# Patient Record
Sex: FEMALE | Race: WHITE | Employment: OTHER | URBAN - METROPOLITAN AREA
[De-identification: names, ages, dates, MRNs, and addresses within clinical notes are randomized per-mention and may not be internally consistent; named-entity substitution may affect disease eponyms.]

---

## 2018-05-02 ENCOUNTER — TELEPHONE (OUTPATIENT)
Dept: INTERNAL MEDICINE CLINIC | Age: 63
End: 2018-05-02

## 2018-05-02 NOTE — TELEPHONE ENCOUNTER
Was recently diagnosed with diabetes by her gyno, Dr. Seng Marie. States she needs to re-establish with Dr. Dionisio MASCORRO so he can send her to endocrinology. Was told I would need to get permission since it has been 10 years since she was last seen. When told she would be considered a new patient and that Dr. Dionisio Ang is not taking new patients, she said her  is a patient and she certainly hoped Dr. Dionisio Ang would see her again. Please let her know.

## 2018-05-03 NOTE — TELEPHONE ENCOUNTER
Pt is frustrated that Dr/Nurse has not yet returned her call about what to do about her Diabetes. Her GYN told her to contact her PCP and be seen by a  Endocrinologist.  Pt stated she had not been in to see Dr Noreen Gordon since about 2008 but, expects a call back!   Advise ASAP - [phone]

## 2018-05-03 NOTE — TELEPHONE ENCOUNTER
Pt says that [facility] does not have an appt for 2 mons. Pt wanst Dr La Nena Rosado to get her in sooner as she was told by her GYN she had diabetes. ADVISE ASAP - [phone]  Pt Disappointed she has not yet heard from Dr La Nena Rosado!

## 2018-05-11 ENCOUNTER — OFFICE VISIT (OUTPATIENT)
Dept: INTERNAL MEDICINE CLINIC | Age: 63
End: 2018-05-11

## 2018-05-11 VITALS
RESPIRATION RATE: 20 BRPM | SYSTOLIC BLOOD PRESSURE: 134 MMHG | BODY MASS INDEX: 33.01 KG/M2 | OXYGEN SATURATION: 97 % | DIASTOLIC BLOOD PRESSURE: 84 MMHG | TEMPERATURE: 98.5 F | WEIGHT: 179.4 LBS | HEART RATE: 79 BPM | HEIGHT: 62 IN

## 2018-05-11 DIAGNOSIS — E11.9 TYPE 2 DIABETES MELLITUS WITHOUT COMPLICATION, WITHOUT LONG-TERM CURRENT USE OF INSULIN (HCC): Primary | ICD-10-CM

## 2018-05-11 LAB — HBA1C MFR BLD HPLC: 12.5 %

## 2018-05-11 RX ORDER — GLIMEPIRIDE 1 MG/1
1 TABLET ORAL
Qty: 30 TAB | Refills: 2 | Status: SHIPPED | OUTPATIENT
Start: 2018-05-11 | End: 2018-08-08 | Stop reason: SDUPTHER

## 2018-05-11 RX ORDER — METFORMIN HYDROCHLORIDE 500 MG/1
1000 TABLET, EXTENDED RELEASE ORAL
Qty: 60 TAB | Refills: 2 | Status: SHIPPED | OUTPATIENT
Start: 2018-05-11 | End: 2018-06-08 | Stop reason: SDUPTHER

## 2018-05-11 RX ORDER — LANCETS
EACH MISCELLANEOUS
Qty: 1 EACH | Refills: 11 | Status: SHIPPED | OUTPATIENT
Start: 2018-05-11 | End: 2018-06-08 | Stop reason: SDUPTHER

## 2018-05-11 RX ORDER — INSULIN PUMP SYRINGE, 3 ML
EACH MISCELLANEOUS
Qty: 1 KIT | Refills: 0 | Status: SHIPPED | OUTPATIENT
Start: 2018-05-11 | End: 2019-06-07

## 2018-05-11 RX ORDER — LANCETS
EACH MISCELLANEOUS
Qty: 1 EACH | Refills: 11 | Status: SHIPPED | OUTPATIENT
Start: 2018-05-11 | End: 2018-08-08 | Stop reason: SDUPTHER

## 2018-05-11 NOTE — PROGRESS NOTES
Chief Complaint   Patient presents with    New Patient     establishment    Diabetes     1. Have you been to the ER, urgent care clinic since your last visit? Hospitalized since your last visit? No    2. Have you seen or consulted any other health care providers outside of the [facility] since your last visit? Include any pap smears or colon screening.  No     Visit Vitals    /84 (BP 1 Location: Left arm, BP Patient Position: Sitting)    Pulse 79    Temp 98.5 °F (36.9 °C) (Oral)    Resp 20    Ht 5' 2\" (1.575 m)    Wt 179 lb 6.4 oz (81.4 kg)    SpO2 97%    BMI 32.81 kg/m2

## 2018-05-11 NOTE — MR AVS SNAPSHOT
[address], [address] [place] [address] 
[phone] Patient: Stephanie Alvarenga MRN:  [id number] Visit Information Date & Time Provider Department Dept. Phone Encounter #  
 5/11/2018  4:00 PM [facility] [phone] [id number] Follow-up Instructions Return in about 4 weeks (around 6/8/2018) for CPE, DM2 follow up. Upcoming Health Maintenance Date Due Hepatitis C Screening 1955 DTaP/Tdap/Td series (1 - Tdap) 3/7/1976 PAP AKA CERVICAL CYTOLOGY 3/7/1976 BREAST CANCER SCRN MAMMOGRAM 3/7/2005 FOBT Q 1 YEAR AGE 50-75 3/7/2005 ZOSTER VACCINE AGE 60> 1/7/2015 Influenza Age 5 to Adult 8/1/2018 Allergies as of 5/11/2018  Review Complete On: 5/11/2018 By: Amena Recio NP Severity Noted Reaction Type Reactions Codeine  05/11/2018    Other (comments) Past out Tetanus And Diphther. Tox (Pf)  05/11/2018    Swelling Current Immunizations  Never Reviewed No immunizations on file. Not reviewed this visit You Were Diagnosed With   
  
 Codes Comments Type 2 diabetes mellitus without complication, without long-term current use of insulin (HCC)    -  Primary ICD-10-CM: E11.9 ICD-9-CM: 250.00 Vitals BP Pulse Temp Resp Height(growth percentile) Weight(growth percentile) 134/84 (BP 1 Location: Left arm, BP Patient Position: Sitting) 79 98.5 °F (36.9 °C) (Oral) 20 5' 2\" (1.575 m) 179 lb 6.4 oz (81.4 kg) SpO2 BMI OB Status Smoking Status 97% 32.81 kg/m2 Hysterectomy Never Smoker Vitals History BMI and BSA Data Body Mass Index Body Surface Area  
 32.81 kg/m 2 1.89 m 2 Preferred Pharmacy Pharmacy Name Phone [id number] [place] [address] [phone] Your Updated Medication List  
  
   
 This list is accurate as of 5/11/18  4:57 PM.  Always use your most recent med list.  
  
  
  
  
 Blood-Glucose Meter monitoring kit Commonly known as:  [facility] Check BG 3x/day, Dx E11.9  
  
 glimepiride 1 mg tablet Commonly known as:  AMARYL Take 1 Tab by mouth Daily (before breakfast). glucose blood VI test strips strip Commonly known as:  ASCENSIA AUTODISC VI, ONE TOUCH ULTRA TEST VI Check BG 3x/day, Dx E11.9 OneTouch Miniultra Lancets Misc Check BG 3x/day, Dx E11.9 OneTouch Miniultra  
  
 metFORMIN  mg tablet Commonly known as:  GLUCOPHAGE XR Take 2 Tabs by mouth daily (with dinner). Prescriptions Printed Refills Lancets misc 11 Sig: Check BG 3x/day, Dx E11.9 OneTouch Miniultra Class: Print  
 glucose blood VI test strips (ASCENSIA AUTODISC VI, ONE TOUCH ULTRA TEST VI) strip 5 Sig: Check BG 3x/day, Dx E11.9 OneTouch Miniultra Class: Print Prescriptions Sent to Pharmacy Refills Blood-Glucose Meter (ONETOUCH ULTRAMINI) monitoring kit 0 Sig: Check BG 3x/day, Dx E11.9 Class: Normal  
 Pharmacy: [facility] # [address] [place] [address], [address] Ph #: [phone]  
 metFORMIN ER (GLUCOPHAGE XR) 500 mg tablet 2 Sig: Take 2 Tabs by mouth daily (with dinner). Class: Normal  
 Pharmacy: [facility] # [address] [place] [address], [address] Ph #: [phone] Route: Oral  
 glimepiride (AMARYL) 1 mg tablet 2 Sig: Take 1 Tab by mouth Daily (before breakfast). Class: Normal  
 Pharmacy: [facility] # [address] [place] [address], [address] Ph #: [phone] Route: Oral  
  
We Performed the Following AMB POC HEMOGLOBIN A1C [49639 CPT(R)] Follow-up Instructions Return in about 4 weeks (around 6/8/2018) for CPE, DM2 follow up. Patient Instructions No more than 6g sugar Start metformin 500mg XR tabs, take 2 tabs once a day Start glimeperide 1mg tabs, 1 pill once a day Check blood sugar first thing in the morning (fasting) and write down- bring these numbers with you to your appointments Learning About Meal Planning for Diabetes Why plan your meals? Meal planning can be a key part of managing diabetes. Planning meals and snacks with the right balance of carbohydrate, protein, and fat can help you keep your blood sugar at the target level you set with your doctor. You don't have to eat special foods. You can eat what your family eats, including sweets once in a while. But you do have to pay attention to how often you eat and how much you eat of certain foods. You may want to work with a dietitian or a certified diabetes educator. He or she can give you tips and meal ideas and can answer your questions about meal planning. This health professional can also help you reach a healthy weight if that is one of your goals. What plan is right for you? Your dietitian or diabetes educator may suggest that you start with the plate format or carbohydrate counting. The plate format The plate format is a simple way to help you manage how you eat. You plan meals by learning how much space each food should take on a plate. Using the plate format helps you spread carbohydrate throughout the day. It can make it easier to keep your blood sugar level within your target range. It also helps you see if you're eating healthy portion sizes. To use the plate format, you put non-starchy vegetables on half your plate. Add meat or meat substitutes on one-quarter of the plate. Put a grain or starchy vegetable (such as brown rice or a potato) on the final quarter of the plate. You can add a small piece of fruit and some low-fat or fat-free milk or yogurt, depending on your carbohydrate goal for each meal. 
Here are some tips for using the plate format: · Make sure that you are not using an oversized plate.  A 9-inch plate is best. Many restaurants use larger plates. · Get used to using the plate format at home. Then you can use it when you eat out. · Write down your questions about using the plate format. Talk to your doctor, a dietitian, or a diabetes educator about your concerns. Carbohydrate counting With carbohydrate counting, you plan meals based on the amount of carbohydrate in each food. Carbohydrate raises blood sugar higher and more quickly than any other nutrient. It is found in desserts, breads and cereals, and fruit. It's also found in starchy vegetables such as potatoes and corn, grains such as rice and pasta, and milk and yogurt. Spreading carbohydrate throughout the day helps keep your blood sugar levels within your target range. Your daily amount depends on several things, including your weight, how active you are, which diabetes medicines you take, and what your goals are for your blood sugar levels. A registered dietitian or diabetes educator can help you plan how much carbohydrate to include in each meal and snack. A guideline for your daily amount of carbohydrate is: · 45 to 60 grams at each meal. That's about the same as 3 to 4 carbohydrate servings. · 15 to 20 grams at each snack. That's about the same as 1 carbohydrate serving. The Nutrition Facts label on packaged foods tells you how much carbohydrate is in a serving of the food. First, look at the serving size on the food label. Is that the amount you eat in a serving? All of the nutrition information on a food label is based on that serving size. So if you eat more or less than that, you'll need to adjust the other numbers. Total carbohydrate is the next thing you need to look for on the label. If you count carbohydrate servings, one serving of carbohydrate is 15 grams. For foods that don't come with labels, such as fresh fruits and vegetables, you'll need a guide that lists carbohydrate in these foods. Ask your doctor, dietitian, or diabetes educator about books or other nutrition guides you can use. If you take insulin, you need to know how many grams of carbohydrate are in a meal. This lets you know how much rapid-acting insulin to take before you eat. If you use an insulin pump, you get a constant rate of insulin during the day. So the pump must be programmed at meals to give you extra insulin to cover the rise in blood sugar after meals. When you know how much carbohydrate you will eat, you can take the right amount of insulin. Or, if you always use the same amount of insulin, you need to make sure that you eat the same amount of carbohydrate at meals. If you need more help to understand carbohydrate counting and food labels, ask your doctor, dietitian, or diabetes educator. How do you get started with meal planning? Here are some tips to get started: 
· Plan your meals a week at a time. Don't forget to include snacks too. · Use cookbooks or online recipes to plan several main meals. Plan some quick meals for busy nights. You also can double some recipes that freeze well. Then you can save half for other busy nights when you don't have time to cook. · Make sure you have the ingredients you need for your recipes. If you're running low on basic items, put these items on your shopping list too. · List foods that you use to make breakfasts, lunches, and snacks. List plenty of fruits and vegetables. · Post this list on the refrigerator. Add to it as you think of more things you need. · Take the list to the store to do your weekly shopping. Follow-up care is a key part of your treatment and safety. Be sure to make and go to all appointments, and call your doctor if you are having problems. It's also a good idea to know your test results and keep a list of the medicines you take. Where can you learn more? Go to http://sheila-tonia.info/. Radha Schaefer in the search box to learn more about \"Learning About Meal Planning for Diabetes. \" Current as of: March 13, 2017 Content Version: 11.4 © 9734-8874 [employer]. Care instructions adapted under license by [employer] (which disclaims liability or warranty for this information). If you have questions about a medical condition or this instruction, always ask your healthcare professional. [address] any warranty or liability for your use of this information. Learning About Diabetes Food Guidelines Your Care Instructions Meal planning is important to manage diabetes. It helps keep your blood sugar at a target level (which you set with your doctor). You don't have to eat special foods. You can eat what your family eats, including sweets once in a while. But you do have to pay attention to how often you eat and how much you eat of certain foods. You may want to work with a dietitian or a certified diabetes educator (CDE) to help you plan meals and snacks. A dietitian or CDE can also help you lose weight if that is one of your goals. What should you know about eating carbs? Managing the amount of carbohydrate (carbs) you eat is an important part of healthy meals when you have diabetes. Carbohydrate is found in many foods. · Learn which foods have carbs. And learn the amounts of carbs in different foods. ¨ Bread, cereal, pasta, and rice have about 15 grams of carbs in a serving. A serving is 1 slice of bread (1 ounce), ½ cup of cooked cereal, or 1/3 cup of cooked pasta or rice. ¨ Fruits have 15 grams of carbs in a serving. A serving is 1 small fresh fruit, such as an apple or orange; ½ of a banana; ½ cup of cooked or canned fruit; ½ cup of fruit juice; 1 cup of melon or raspberries; or 2 tablespoons of dried fruit. ¨ Milk and no-sugar-added yogurt have 15 grams of carbs in a serving. A serving is 1 cup of milk or 2/3 cup of no-sugar-added yogurt. ¨ Starchy vegetables have 15 grams of carbs in a serving. A serving is ½ cup of mashed potatoes or sweet potato; 1 cup winter squash; ½ of a small baked potato; ½ cup of cooked beans; or ½ cup cooked corn or green peas. · Learn how much carbs to eat each day and at each meal. A dietitian or CDE can teach you how to keep track of the amount of carbs you eat. This is called carbohydrate counting. · If you are not sure how to count carbohydrate grams, use the Plate Method to plan meals. It is a good, quick way to make sure that you have a balanced meal. It also helps you spread carbs throughout the day. ¨ Divide your plate by types of foods. Put non-starchy vegetables on half the plate, meat or other protein food on one-quarter of the plate, and a grain or starchy vegetable in the final quarter of the plate. To this you can add a small piece of fruit and 1 cup of milk or yogurt, depending on how many carbs you are supposed to eat at a meal. 
· Try to eat about the same amount of carbs at each meal. Do not \"save up\" your daily allowance of carbs to eat at one meal. 
· Proteins have very little or no carbs per serving. Examples of proteins are beef, chicken, turkey, fish, eggs, tofu, cheese, cottage cheese, and peanut butter. A serving size of meat is 3 ounces, which is about the size of a deck of cards. Examples of meat substitute serving sizes (equal to 1 ounce of meat) are 1/4 cup of cottage cheese, 1 egg, 1 tablespoon of peanut butter, and ½ cup of tofu. How can you eat out and still eat healthy? · Learn to estimate the serving sizes of foods that have carbohydrate. If you measure food at home, it will be easier to estimate the amount in a serving of restaurant food. · If the meal you order has too much carbohydrate (such as potatoes, corn, or baked beans), ask to have a low-carbohydrate food instead. Ask for a salad or green vegetables.  
· If you use insulin, check your blood sugar before and after eating out to help you plan how much to eat in the future. · If you eat more carbohydrate at a meal than you had planned, take a walk or do other exercise. This will help lower your blood sugar. What else should you know? · Limit saturated fat, such as the fat from meat and dairy products. This is a healthy choice because people who have diabetes are at higher risk of heart disease. So choose lean cuts of meat and nonfat or low-fat dairy products. Use olive or canola oil instead of butter or shortening when cooking. · Don't skip meals. Your blood sugar may drop too low if you skip meals and take insulin or certain medicines for diabetes. · Check with your doctor before you drink alcohol. Alcohol can cause your blood sugar to drop too low. Alcohol can also cause a bad reaction if you take certain diabetes medicines. Follow-up care is a key part of your treatment and safety. Be sure to make and go to all appointments, and call your doctor if you are having problems. It's also a good idea to know your test results and keep a list of the medicines you take. Where can you learn more? Go to http://sheilaBlowout Boutiquetonia.info/. Enter T021 in the search box to learn more about \"Learning About Diabetes Food Guidelines. \" Current as of: March 13, 2017 Content Version: 11.4 © 4128-2944 [employer]. Care instructions adapted under license by [employer] (which disclaims liability or warranty for this information). If you have questions about a medical condition or this instruction, always ask your healthcare professional. [place] [address] any warranty or liability for your use of this information. Home Blood Glucose Test: About This Test 
What is it? A home blood glucose test measures the amount of a type of sugar, called glucose, in your blood. Why is this test done?  
People who have diabetes need to check the amount of glucose in their blood. A home blood glucose test is an easy way to test your blood at home or when you are away from home. The results help you know when to take action to keep your blood glucose levels in a target range. How can you prepare for the test? 
· Check the expiration date on the bottle of testing strips. Do not use test strips that have . · Match the code number on the testing strips bottle with the number on the meter. If the numbers do not match, follow the directions with the meter for changing the code number. What happens before the test? 
The supplies you will need for testing blood glucose include: · A blood glucose meter. · Testing strips. These are made to be used with a specific model of meter. · Sugar control solutions. Some meters require a specific solution. Many new meters are made to operate without a control solution. · Short needles called lancets for pricking your skin. · A pen-sized lakhani for the lancet (lancet device), which positions the lancet and controls how deeply it goes into your skin. · Clean cotton balls. These are used to stop the bleeding from the testing site. What happens during the test? 
A home blood glucose test involves pricking your finger, palm, or forearm with a lancet to collect a drop of blood. The blood drop is placed on a test strip, which you insert into the blood glucose meter. The instructions for testing are slightly different for each blood glucose meter model. Follow the instructions that came with your meter. · Wash your hands with warm, soapy water. Dry them well with a clean towel. You may also use an alcohol wipe to clean your finger or other site, but make sure your hands are dry before the test. 
· Insert a clean lancet into the lancet device. · Remove a test strip from the test strip bottle. Replace the lid immediately to keep moisture away from the other strips. · Follow the instructions that came with your meter to get it ready. · Use the lancet device to stick the side of your fingertip with the lancet. Do not stick the tip of your finger. Some blood sugar meters use lancet devices that take the blood sample from other sites, such as the palm of the hand or the forearm. But the finger is usually the most accurate place to test blood sugar. · Put a drop of blood on the correct spot on the test strip. · Apply pressure with a clean cotton ball to stop the bleeding. · Follow the directions that came with the meter to get the results. · Write down the results and the time that you tested your blood. Some meters will store the results for you. What else should you know about the test? 
The American Diabetes Association (ADA) recommends that you stay within the following blood glucose level ranges. But depending on your health, you and your doctor may set a different range for you. For nonpregnant adults with diabetes: 
· 80 milligrams per deciliter (mg/dL) to 130 mg/dL before a meal 
· Less than 180 mg/dL 1 to 2 hours after a meal 
For women who have diabetes related to pregnancy (gestational diabetes): · 95 mg/dL or less before breakfast 
· 120 to 140 mg/dL (or lower) 1 to 2 hours after a meal 
How long does the test take? · The blood glucose meter will show the results of the test in a minute or less. Where can you learn more? Go to http://sheila-tonia.info/. Enter N963 in the search box to learn more about \"Home Blood Glucose Test: About This Test.\" Current as of: March 13, 2017 Content Version: 11.4 © 9055-1269 [employer]. Care instructions adapted under license by [employer] (which disclaims liability or warranty for this information). If you have questions about a medical condition or this instruction, always ask your healthcare professional. [place] [address] any warranty or liability for your use of this information. Learning About Type 2 Diabetes What is type 2 diabetes? Insulin is a hormone that helps your body use sugar from your food as energy. Type 2 diabetes happens when your body can't use insulin the right way. Over time, the pancreas can't make enough insulin. If you don't have enough insulin, too much sugar stays in your blood. If you are overweight, get little or no exercise, or have type 2 diabetes in your family, you are more likely to have problems with the way insulin works in your body.  Americans, Hispanics, Native Americans,  Americans, and Pacific Islanders have a higher risk for type 2 diabetes. Type 2 diabetes can be prevented or delayed with a healthy lifestyle, which includes staying at a healthy weight, making smart food choices, and getting regular exercise. What can you expect with type 2 diabetes? Son Hilario keep hearing about how important it is to keep your blood sugar within a target range. That's because over time, high blood sugar can lead to serious problems. It can: 
· Harm your eyes, nerves, and kidneys. · Damage your blood vessels, leading to heart disease and stroke. · Reduce blood flow and cause nerve damage to parts of your body, especially your feet. This can cause slow healing and pain when you walk. · Make your immune system weak and less able to fight infections. When people hear the word \"diabetes,\" they often think of problems like these. But daily care and treatment can help prevent or delay these problems. The goal is to keep your blood sugar in a target range. That's the best way to reduce your chance of having more problems from diabetes. What are the symptoms? Some people who have type 2 diabetes may not have any symptoms early on. Many people with the disease don't even know they have it at first. But with time, diabetes starts to cause symptoms. You experience most symptoms of type 2 diabetes when your blood sugar is either too high or too low. The most common symptoms of high blood sugar include: · Thirst. 
· Frequent urination. · Weight loss. · Blurry vision. The symptoms of low blood sugar include: · Sweating. · Shakiness. · Weakness. · Hunger. · Confusion. How can you prevent type 2 diabetes? The best way to prevent or delay type 2 diabetes is to adopt healthy habits, which include: 
· Staying at a healthy weight. · Exercising regularly. · Eating healthy foods. How is type 2 diabetes treated? If you have type 2 diabetes, here are the most important things you can do. · Take your diabetes medicines. · Check your blood sugar as often as your doctor recommends. Also, get a hemoglobin A1c test at least every 6 months. · Try to eat a variety of foods and to spread carbohydrate throughout the day. Carbohydrate raises blood sugar higher and more quickly than any other nutrient does. Carbohydrate is found in sugar, breads and cereals, fruit, starchy vegetables such as potatoes and corn, and milk and yogurt. · Get at least 30 minutes of exercise on most days of the week. Walking is a good choice. You also may want to do other activities, such as running, swimming, cycling, or playing tennis or team sports. If your doctor says it's okay, do muscle-strengthening exercises at least 2 times a week. · See your doctor for checkups and tests on a regular schedule. · If you have high blood pressure or high cholesterol, take the medicines as prescribed by your doctor. · Do not smoke. Smoking can make health problems worse. This includes problems you might have with type 2 diabetes. If you need help quitting, talk to your doctor about stop-smoking programs and medicines. These can increase your chances of quitting for good. Follow-up care is a key part of your treatment and safety. Be sure to make and go to all appointments, and call your doctor if you are having problems.  It's also a good idea to know your test results and keep a list of the medicines you take. Where can you learn more? Go to http://sheila-tonia.info/. Enter J022 in the search box to learn more about \"Learning About Type 2 Diabetes. \" Current as of: March 13, 2017 Content Version: 11.4 © 9773-9305 [employer]. Care instructions adapted under license by [employer] (which disclaims liability or warranty for this information). If you have questions about a medical condition or this instruction, always ask your healthcare professional. [address] any warranty or liability for your use of this information. Learning About Metformin for Type 2 Diabetes Introduction Metformin (such as Glucophage) is a medicine used to treat type 2 diabetes. It helps keep blood sugar levels on target. You may have tried to eat a healthy diet, lose weight, and get more exercise to keep your blood sugar in your target range. If those things do not help, you may take a medicine called metformin. It helps your body use insulin. This can help you control your blood sugar. You might take it on its own or with other medicines. When taken on its own, metformin should not cause low blood sugar or weight gain. Example · Metformin (Glucophage) Possible side effects Common side effects include: · Short-term nausea. · Not feeling hungry. · Diarrhea. · Increased gas in your belly. · A metallic taste. You may have side effects or reactions not listed here. Check the information that comes with your medicine. What to know about taking this medicine · Metformin does not usually cause low blood sugar. But you may get a low blood sugar when you take metformin and you exercise hard, drink alcohol, or you do not eat enough food. · Sometimes metformin is combined with other diabetes medicine. Some of these can cause low blood sugar.  
· If you need a test that uses a dye or you need to have surgery, be sure to tell all of your doctors that you take metformin. You may have to stop taking it before and after the test or surgery. · Over time, blood levels of vitamin B12 can decrease in some people who take metformin. Your body needs this B vitamin to make blood cells. It also keeps your nervous system healthy. If you have been taking metformin for more than a few years, ask your doctor if you need a B12 blood test to measure the amount of vitamin B12 in your blood. · Be safe with medicines. Take your medicines exactly as prescribed. Call your doctor if you think you are having a problem with your medicine. · Check with your doctor or pharmacist before you use any other medicines. This includes over-the-counter medicines. Make sure your doctor knows all of the medicines, vitamins, herbal products, and supplements you take. Taking some medicines together can cause problems. Where can you learn more? Go to http://sheila-tonia.info/. Enter Judy Rapp in the search box to learn more about \"Learning About Metformin for Type 2 Diabetes. \" Current as of: March 13, 2017 Content Version: 11.4 © 8670-9254 [employer]. Care instructions adapted under license by [employer] (which disclaims liability or warranty for this information). If you have questions about a medical condition or this instruction, always ask your healthcare professional. [address] any warranty or liability for your use of this information. Introducing [facility]! Meghan Amaya introduces [employer] patient portal. Now you can access parts of your medical record, email your doctor's office, and request medication refills online. 1. In your internet browser, go to [email or web address] 2. Click on the First Time User? Click Here link in the Sign In box. You will see the New Member Sign Up page. 3. Enter your [employer] Access Code exactly as it appears below. You will not need to use this code after youve completed the sign-up process. If you do not sign up before the expiration date, you must request a new code. · [employer] Access Code: [email or web address] Expires: 8/9/2018  4:53 PM 
 
4. Enter the last four digits of your Social Security Number (xxxx) and Date of Birth (mm/dd/yyyy) as indicated and click Submit. You will be taken to the next sign-up page. 5. Create a [employer] ID. This will be your [employer] login ID and cannot be changed, so think of one that is secure and easy to remember. 6. Create a [employer] password. You can change your password at any time. 7. Enter your Password Reset Question and Answer. This can be used at a later time if you forget your password. 8. Enter your e-mail address. You will receive e-mail notification when new information is available in [address] 9. Click Sign Up. You can now view and download portions of your medical record. 10. Click the Download Summary menu link to download a portable copy of your medical information. If you have questions, please visit the Frequently Asked Questions section of the [employer] website. Remember, [employer] is NOT to be used for urgent needs. For medical emergencies, dial 911. Now available from your iPhone and Android! Please provide this summary of care documentation to your next provider. If you have any questions after today's visit, please call [phone].

## 2018-05-11 NOTE — PATIENT INSTRUCTIONS
No more than 6g sugar     Start metformin 500mg XR tabs, take 2 tabs once a day    Start glimeperide 1mg tabs, 1 pill once a day    Check blood sugar first thing in the morning (fasting) and write down- bring these numbers with you to your appointments           Learning About Meal Planning for Diabetes  Why plan your meals? Meal planning can be a key part of managing diabetes. Planning meals and snacks with the right balance of carbohydrate, protein, and fat can help you keep your blood sugar at the target level you set with your doctor. You don't have to eat special foods. You can eat what your family eats, including sweets once in a while. But you do have to pay attention to how often you eat and how much you eat of certain foods. You may want to work with a dietitian or a certified diabetes educator. He or she can give you tips and meal ideas and can answer your questions about meal planning. This health professional can also help you reach a healthy weight if that is one of your goals. What plan is right for you? Your dietitian or diabetes educator may suggest that you start with the plate format or carbohydrate counting. The plate format  The plate format is a simple way to help you manage how you eat. You plan meals by learning how much space each food should take on a plate. Using the plate format helps you spread carbohydrate throughout the day. It can make it easier to keep your blood sugar level within your target range. It also helps you see if you're eating healthy portion sizes. To use the plate format, you put non-starchy vegetables on half your plate. Add meat or meat substitutes on one-quarter of the plate. Put a grain or starchy vegetable (such as brown rice or a potato) on the final quarter of the plate.  You can add a small piece of fruit and some low-fat or fat-free milk or yogurt, depending on your carbohydrate goal for each meal.  Here are some tips for using the plate format:  · Make sure that you are not using an oversized plate. A 9-inch plate is best. Many restaurants use larger plates. · Get used to using the plate format at home. Then you can use it when you eat out. · Write down your questions about using the plate format. Talk to your doctor, a dietitian, or a diabetes educator about your concerns. Carbohydrate counting  With carbohydrate counting, you plan meals based on the amount of carbohydrate in each food. Carbohydrate raises blood sugar higher and more quickly than any other nutrient. It is found in desserts, breads and cereals, and fruit. It's also found in starchy vegetables such as potatoes and corn, grains such as rice and pasta, and milk and yogurt. Spreading carbohydrate throughout the day helps keep your blood sugar levels within your target range. Your daily amount depends on several things, including your weight, how active you are, which diabetes medicines you take, and what your goals are for your blood sugar levels. A registered dietitian or diabetes educator can help you plan how much carbohydrate to include in each meal and snack. A guideline for your daily amount of carbohydrate is:  · 45 to 60 grams at each meal. That's about the same as 3 to 4 carbohydrate servings. · 15 to 20 grams at each snack. That's about the same as 1 carbohydrate serving. The Nutrition Facts label on packaged foods tells you how much carbohydrate is in a serving of the food. First, look at the serving size on the food label. Is that the amount you eat in a serving? All of the nutrition information on a food label is based on that serving size. So if you eat more or less than that, you'll need to adjust the other numbers. Total carbohydrate is the next thing you need to look for on the label. If you count carbohydrate servings, one serving of carbohydrate is 15 grams.   For foods that don't come with labels, such as fresh fruits and vegetables, you'll need a guide that lists carbohydrate in these foods. Ask your doctor, dietitian, or diabetes educator about books or other nutrition guides you can use. If you take insulin, you need to know how many grams of carbohydrate are in a meal. This lets you know how much rapid-acting insulin to take before you eat. If you use an insulin pump, you get a constant rate of insulin during the day. So the pump must be programmed at meals to give you extra insulin to cover the rise in blood sugar after meals. When you know how much carbohydrate you will eat, you can take the right amount of insulin. Or, if you always use the same amount of insulin, you need to make sure that you eat the same amount of carbohydrate at meals. If you need more help to understand carbohydrate counting and food labels, ask your doctor, dietitian, or diabetes educator. How do you get started with meal planning? Here are some tips to get started:  · Plan your meals a week at a time. Don't forget to include snacks too. · Use cookbooks or online recipes to plan several main meals. Plan some quick meals for busy nights. You also can double some recipes that freeze well. Then you can save half for other busy nights when you don't have time to cook. · Make sure you have the ingredients you need for your recipes. If you're running low on basic items, put these items on your shopping list too. · List foods that you use to make breakfasts, lunches, and snacks. List plenty of fruits and vegetables. · Post this list on the refrigerator. Add to it as you think of more things you need. · Take the list to the store to do your weekly shopping. Follow-up care is a key part of your treatment and safety. Be sure to make and go to all appointments, and call your doctor if you are having problems. It's also a good idea to know your test results and keep a list of the medicines you take. Where can you learn more? Go to http://sheila-tonia.info/.   Palmer Rodrigues in the search box to learn more about \"Learning About Meal Planning for Diabetes. \"  Current as of: March 13, 2017  Content Version: 11.4  © 1199-8241 [employer]. Care instructions adapted under license by [employer] (which disclaims liability or warranty for this information). If you have questions about a medical condition or this instruction, always ask your healthcare professional. [address] any warranty or liability for your use of this information. Learning About Diabetes Food Guidelines  Your Care Instructions    Meal planning is important to manage diabetes. It helps keep your blood sugar at a target level (which you set with your doctor). You don't have to eat special foods. You can eat what your family eats, including sweets once in a while. But you do have to pay attention to how often you eat and how much you eat of certain foods. You may want to work with a dietitian or a certified diabetes educator (CDE) to help you plan meals and snacks. A dietitian or CDE can also help you lose weight if that is one of your goals. What should you know about eating carbs? Managing the amount of carbohydrate (carbs) you eat is an important part of healthy meals when you have diabetes. Carbohydrate is found in many foods. · Learn which foods have carbs. And learn the amounts of carbs in different foods. ¨ Bread, cereal, pasta, and rice have about 15 grams of carbs in a serving. A serving is 1 slice of bread (1 ounce), ½ cup of cooked cereal, or 1/3 cup of cooked pasta or rice. ¨ Fruits have 15 grams of carbs in a serving. A serving is 1 small fresh fruit, such as an apple or orange; ½ of a banana; ½ cup of cooked or canned fruit; ½ cup of fruit juice; 1 cup of melon or raspberries; or 2 tablespoons of dried fruit. ¨ Milk and no-sugar-added yogurt have 15 grams of carbs in a serving. A serving is 1 cup of milk or 2/3 cup of no-sugar-added yogurt.   ¨ Starchy vegetables have 15 grams of carbs in a serving. A serving is ½ cup of mashed potatoes or sweet potato; 1 cup winter squash; ½ of a small baked potato; ½ cup of cooked beans; or ½ cup cooked corn or green peas. · Learn how much carbs to eat each day and at each meal. A dietitian or CDE can teach you how to keep track of the amount of carbs you eat. This is called carbohydrate counting. · If you are not sure how to count carbohydrate grams, use the Plate Method to plan meals. It is a good, quick way to make sure that you have a balanced meal. It also helps you spread carbs throughout the day. ¨ Divide your plate by types of foods. Put non-starchy vegetables on half the plate, meat or other protein food on one-quarter of the plate, and a grain or starchy vegetable in the final quarter of the plate. To this you can add a small piece of fruit and 1 cup of milk or yogurt, depending on how many carbs you are supposed to eat at a meal.  · Try to eat about the same amount of carbs at each meal. Do not \"save up\" your daily allowance of carbs to eat at one meal.  · Proteins have very little or no carbs per serving. Examples of proteins are beef, chicken, turkey, fish, eggs, tofu, cheese, cottage cheese, and peanut butter. A serving size of meat is 3 ounces, which is about the size of a deck of cards. Examples of meat substitute serving sizes (equal to 1 ounce of meat) are 1/4 cup of cottage cheese, 1 egg, 1 tablespoon of peanut butter, and ½ cup of tofu. How can you eat out and still eat healthy? · Learn to estimate the serving sizes of foods that have carbohydrate. If you measure food at home, it will be easier to estimate the amount in a serving of restaurant food. · If the meal you order has too much carbohydrate (such as potatoes, corn, or baked beans), ask to have a low-carbohydrate food instead. Ask for a salad or green vegetables.   · If you use insulin, check your blood sugar before and after eating out to help you plan how much to eat in the future. · If you eat more carbohydrate at a meal than you had planned, take a walk or do other exercise. This will help lower your blood sugar. What else should you know? · Limit saturated fat, such as the fat from meat and dairy products. This is a healthy choice because people who have diabetes are at higher risk of heart disease. So choose lean cuts of meat and nonfat or low-fat dairy products. Use olive or canola oil instead of butter or shortening when cooking. · Don't skip meals. Your blood sugar may drop too low if you skip meals and take insulin or certain medicines for diabetes. · Check with your doctor before you drink alcohol. Alcohol can cause your blood sugar to drop too low. Alcohol can also cause a bad reaction if you take certain diabetes medicines. Follow-up care is a key part of your treatment and safety. Be sure to make and go to all appointments, and call your doctor if you are having problems. It's also a good idea to know your test results and keep a list of the medicines you take. Where can you learn more? Go to http://sheila-tonia.info/. Enter N543 in the search box to learn more about \"Learning About Diabetes Food Guidelines. \"  Current as of: March 13, 2017  Content Version: 11.4  © 6109-3281 [employer], [employer]. Care instructions adapted under license by [employer] (which disclaims liability or warranty for this information). If you have questions about a medical condition or this instruction, always ask your healthcare professional. [place] [address] any warranty or liability for your use of this information. Home Blood Glucose Test: About This Test  What is it? A home blood glucose test measures the amount of a type of sugar, called glucose, in your blood. Why is this test done? People who have diabetes need to check the amount of glucose in their blood.  A home blood glucose test is an easy way to test your blood at home or when you are away from home. The results help you know when to take action to keep your blood glucose levels in a target range. How can you prepare for the test?  · Check the expiration date on the bottle of testing strips. Do not use test strips that have . · Match the code number on the testing strips bottle with the number on the meter. If the numbers do not match, follow the directions with the meter for changing the code number. What happens before the test?  The supplies you will need for testing blood glucose include:  · A blood glucose meter. · Testing strips. These are made to be used with a specific model of meter. · Sugar control solutions. Some meters require a specific solution. Many new meters are made to operate without a control solution. · Short needles called lancets for pricking your skin. · A pen-sized lakhani for the lancet (lancet device), which positions the lancet and controls how deeply it goes into your skin. · Clean cotton balls. These are used to stop the bleeding from the testing site. What happens during the test?  A home blood glucose test involves pricking your finger, palm, or forearm with a lancet to collect a drop of blood. The blood drop is placed on a test strip, which you insert into the blood glucose meter. The instructions for testing are slightly different for each blood glucose meter model. Follow the instructions that came with your meter. · Wash your hands with warm, soapy water. Dry them well with a clean towel. You may also use an alcohol wipe to clean your finger or other site, but make sure your hands are dry before the test.  · Insert a clean lancet into the lancet device. · Remove a test strip from the test strip bottle. Replace the lid immediately to keep moisture away from the other strips. · Follow the instructions that came with your meter to get it ready.   · Use the lancet device to stick the side of your fingertip with the lancet. Do not stick the tip of your finger. Some blood sugar meters use lancet devices that take the blood sample from other sites, such as the palm of the hand or the forearm. But the finger is usually the most accurate place to test blood sugar. · Put a drop of blood on the correct spot on the test strip. · Apply pressure with a clean cotton ball to stop the bleeding. · Follow the directions that came with the meter to get the results. · Write down the results and the time that you tested your blood. Some meters will store the results for you. What else should you know about the test?  The American Diabetes Association (ADA) recommends that you stay within the following blood glucose level ranges. But depending on your health, you and your doctor may set a different range for you. For nonpregnant adults with diabetes:  · 80 milligrams per deciliter (mg/dL) to 130 mg/dL before a meal  · Less than 180 mg/dL 1 to 2 hours after a meal  For women who have diabetes related to pregnancy (gestational diabetes):  · 95 mg/dL or less before breakfast  · 120 to 140 mg/dL (or lower) 1 to 2 hours after a meal  How long does the test take? · The blood glucose meter will show the results of the test in a minute or less. Where can you learn more? Go to http://sheila-tonia.info/. Enter B703 in the search box to learn more about \"Home Blood Glucose Test: About This Test.\"  Current as of: March 13, 2017  Content Version: 11.4  © 1760-6985 [employer]. Care instructions adapted under license by [employer] (which disclaims liability or warranty for this information). If you have questions about a medical condition or this instruction, always ask your healthcare professional. [place] [address] any warranty or liability for your use of this information. Learning About Type 2 Diabetes  What is type 2 diabetes?     Insulin is a hormone that helps your body use sugar from your food as energy. Type 2 diabetes happens when your body can't use insulin the right way. Over time, the pancreas can't make enough insulin. If you don't have enough insulin, too much sugar stays in your blood. If you are overweight, get little or no exercise, or have type 2 diabetes in your family, you are more likely to have problems with the way insulin works in your body.  Americans, Hispanics, Native Americans,  Americans, and Pacific Islanders have a higher risk for type 2 diabetes. Type 2 diabetes can be prevented or delayed with a healthy lifestyle, which includes staying at a healthy weight, making smart food choices, and getting regular exercise. What can you expect with type 2 diabetes? Sujatha Blanco keep hearing about how important it is to keep your blood sugar within a target range. That's because over time, high blood sugar can lead to serious problems. It can:  · Harm your eyes, nerves, and kidneys. · Damage your blood vessels, leading to heart disease and stroke. · Reduce blood flow and cause nerve damage to parts of your body, especially your feet. This can cause slow healing and pain when you walk. · Make your immune system weak and less able to fight infections. When people hear the word \"diabetes,\" they often think of problems like these. But daily care and treatment can help prevent or delay these problems. The goal is to keep your blood sugar in a target range. That's the best way to reduce your chance of having more problems from diabetes. What are the symptoms? Some people who have type 2 diabetes may not have any symptoms early on. Many people with the disease don't even know they have it at first. But with time, diabetes starts to cause symptoms. You experience most symptoms of type 2 diabetes when your blood sugar is either too high or too low.   The most common symptoms of high blood sugar include:  · Thirst.  · Frequent urination. · Weight loss. · Blurry vision. The symptoms of low blood sugar include:  · Sweating. · Shakiness. · Weakness. · Hunger. · Confusion. How can you prevent type 2 diabetes? The best way to prevent or delay type 2 diabetes is to adopt healthy habits, which include:  · Staying at a healthy weight. · Exercising regularly. · Eating healthy foods. How is type 2 diabetes treated? If you have type 2 diabetes, here are the most important things you can do. · Take your diabetes medicines. · Check your blood sugar as often as your doctor recommends. Also, get a hemoglobin A1c test at least every 6 months. · Try to eat a variety of foods and to spread carbohydrate throughout the day. Carbohydrate raises blood sugar higher and more quickly than any other nutrient does. Carbohydrate is found in sugar, breads and cereals, fruit, starchy vegetables such as potatoes and corn, and milk and yogurt. · Get at least 30 minutes of exercise on most days of the week. Walking is a good choice. You also may want to do other activities, such as running, swimming, cycling, or playing tennis or team sports. If your doctor says it's okay, do muscle-strengthening exercises at least 2 times a week. · See your doctor for checkups and tests on a regular schedule. · If you have high blood pressure or high cholesterol, take the medicines as prescribed by your doctor. · Do not smoke. Smoking can make health problems worse. This includes problems you might have with type 2 diabetes. If you need help quitting, talk to your doctor about stop-smoking programs and medicines. These can increase your chances of quitting for good. Follow-up care is a key part of your treatment and safety. Be sure to make and go to all appointments, and call your doctor if you are having problems. It's also a good idea to know your test results and keep a list of the medicines you take. Where can you learn more?   Go to http://sheila-tonia.info/. Enter M035 in the search box to learn more about \"Learning About Type 2 Diabetes. \"  Current as of: March 13, 2017  Content Version: 11.4  © 4321-8592 [employer]. Care instructions adapted under license by [employer] (which disclaims liability or warranty for this information). If you have questions about a medical condition or this instruction, always ask your healthcare professional. [address] any warranty or liability for your use of this information. Learning About Metformin for Type 2 Diabetes  Introduction    Metformin (such as Glucophage) is a medicine used to treat type 2 diabetes. It helps keep blood sugar levels on target. You may have tried to eat a healthy diet, lose weight, and get more exercise to keep your blood sugar in your target range. If those things do not help, you may take a medicine called metformin. It helps your body use insulin. This can help you control your blood sugar. You might take it on its own or with other medicines. When taken on its own, metformin should not cause low blood sugar or weight gain. Example  · Metformin (Glucophage)  Possible side effects  Common side effects include:  · Short-term nausea. · Not feeling hungry. · Diarrhea. · Increased gas in your belly. · A metallic taste. You may have side effects or reactions not listed here. Check the information that comes with your medicine. What to know about taking this medicine  · Metformin does not usually cause low blood sugar. But you may get a low blood sugar when you take metformin and you exercise hard, drink alcohol, or you do not eat enough food. · Sometimes metformin is combined with other diabetes medicine. Some of these can cause low blood sugar. · If you need a test that uses a dye or you need to have surgery, be sure to tell all of your doctors that you take metformin.  You may have to stop taking it before and after the test or surgery. · Over time, blood levels of vitamin B12 can decrease in some people who take metformin. Your body needs this B vitamin to make blood cells. It also keeps your nervous system healthy. If you have been taking metformin for more than a few years, ask your doctor if you need a B12 blood test to measure the amount of vitamin B12 in your blood. · Be safe with medicines. Take your medicines exactly as prescribed. Call your doctor if you think you are having a problem with your medicine. · Check with your doctor or pharmacist before you use any other medicines. This includes over-the-counter medicines. Make sure your doctor knows all of the medicines, vitamins, herbal products, and supplements you take. Taking some medicines together can cause problems. Where can you learn more? Go to http://sheila-tonia.info/. Enter Josse Mackey in the search box to learn more about \"Learning About Metformin for Type 2 Diabetes. \"  Current as of: March 13, 2017  Content Version: 11.4  © 7789-5057 Healthwise, [employer]. Care instructions adapted under license by [employer] (which disclaims liability or warranty for this information). If you have questions about a medical condition or this instruction, always ask your healthcare professional. [address] any warranty or liability for your use of this information.

## 2018-05-11 NOTE — PROGRESS NOTES
Subjective: Mary Lou Beltran is a 61 y.o. female who presents today for new patient appointment    Was 8 minutes late to her appointment today    Previously followed by Dr. Paige Wood, however had not been seen since 2008    Was recently diagnosed (1 week ago) with DM2 by OBGYN (Dr. Escalante Every with [facility]) and was told to follow up with PCP    Had about 40lb over the past 1-1.5 years or so, had changed her diet  Likes to drink sweet tea daily- 4 glasses a day  Had HgbA1c done, wasn't sure what her HgbA1c was though  poc HgbA1c 12.5% today  Did glucose challenge test at [facility] [place] as well  Has had several labs through OBGYN, unsure of which and results  No numbness or tingling of extremities  No polyuria or polydypsia  No family history of diabetes    No real exercise other than yard work/house work, occasionally walks dog    Diet prior to finding out DM2:  Breakfast: varies, cereal, eggs, omlet with peppers  Lunch: tomatoe sandwhich, grilled cheese  Dinner: meat, mashed potatoes, white rice, veggies  Was eating sweets every night after dinner    Over the past week has cut out sugar, tried to not eat simple/white carbs  Has only drank 1 glass of unsweetened ice tea    No other chronic medical conditions    Pap and mammogram UTD- [facility]    Patient Active Problem List    Diagnosis Date Noted    Type II diabetes mellitus ([facility] [address] 05/11/2018        Review of Systems    Pertinent items are noted in HPI.      Objective:     Visit Vitals    /84 (BP 1 Location: Left arm, BP Patient Position: Sitting)    Pulse 79    Temp 98.5 °F (36.9 °C) (Oral)    Resp 20    Ht 5' 2\" (1.575 m)    Wt 179 lb 6.4 oz (81.4 kg)    SpO2 97%    BMI 32.81 kg/m2     General appearance: alert, cooperative, no distress, appears stated age  Head: Normocephalic, without obvious abnormality, atraumatic  Lungs: normal effort  Heart: regular rate  Extremities: extremities normal, atraumatic, no cyanosis or edema  Skin: warm and dry  Neurologic: Grossly normal    Assessment/Plan:     1. Type 2 diabetes mellitus without complication, without long-term current use of insulin ([facility] [address]  - education regarding DM2, regarding diet- limiting carbohydrates/portion control/limiting sugar in processed foods  - needs to start walking, start with 1 mile 2x/day 5 days a week  - will schedule with PharmD Zac for diet education and nutrition education  - will obtain labs and records from OBGYN before ordering labs  - AMB POC HEMOGLOBIN A1C  - Blood-Glucose Meter (ONETOUCH ULTRAMINI) monitoring kit; Check BG 3x/day, Dx E11.9  Dispense: 1 Kit; Refill: 0  - Lancets misc; Check BG 3x/day, Dx E11.9  OneTouch Miniultra  Dispense: 1 Each; Refill: 11  - glucose blood VI test strips (ASCENSIA AUTODISC VI, ONE TOUCH ULTRA TEST VI) strip; Check BG 3x/day, Dx E11.9  OneTouch Miniultra  Dispense: 100 Strip; Refill: 5  - metFORMIN ER (GLUCOPHAGE XR) 500 mg tablet; Take 2 Tabs by mouth daily (with dinner). Dispense: 60 Tab; Refill: 2  - glimepiride (AMARYL) 1 mg tablet; Take 1 Tab by mouth Daily (before breakfast). Dispense: 30 Tab; Refill: 2  - Lancets misc; Check BG 3x/day, Dx E11.9 OneTouch Miniultra  Dispense: 1 Each; Refill: 11  - glucose blood VI test strips (ASCENSIA AUTODISC VI, ONE TOUCH ULTRA TEST VI) strip; Check BG 3x/day, Dx E11.9 OneTouch Miniultra  Dispense: 100 Strip; Refill: 2      Advised her to call back or return to office if symptoms worsen/change/persist.  Discussed expected course/resolution/complications of diagnosis in detail with patient. Medication risks/benefits/costs/interactions/alternatives discussed with patient. She was given an after visit summary which includes diagnoses, current medications, & vitals. She expressed understanding with the diagnosis and plan.     RENETTA Chakraborty

## 2018-05-14 ENCOUNTER — TELEPHONE (OUTPATIENT)
Dept: INTERNAL MEDICINE CLINIC | Age: 63
End: 2018-05-14

## 2018-05-14 NOTE — TELEPHONE ENCOUNTER
Jason De La O    External Physician           Specialty     Obstetrics & Gynecology, Gynecology, Obstetrics       Primary Contact Information      Phone Fax E-mail Address     [phone] Not available. [address] [facility]     Patient was in office on 5/11/18 and signed a medical release form.

## 2018-05-18 ENCOUNTER — OFFICE VISIT (OUTPATIENT)
Dept: INTERNAL MEDICINE CLINIC | Age: 63
End: 2018-05-18

## 2018-05-18 VITALS
SYSTOLIC BLOOD PRESSURE: 138 MMHG | DIASTOLIC BLOOD PRESSURE: 78 MMHG | HEIGHT: 62 IN | HEART RATE: 77 BPM | TEMPERATURE: 97.5 F | WEIGHT: 179.6 LBS | BODY MASS INDEX: 33.05 KG/M2 | OXYGEN SATURATION: 99 % | RESPIRATION RATE: 20 BRPM

## 2018-05-18 DIAGNOSIS — E11.9 TYPE 2 DIABETES MELLITUS WITHOUT COMPLICATION, WITHOUT LONG-TERM CURRENT USE OF INSULIN (HCC): Primary | ICD-10-CM

## 2018-05-18 NOTE — PROGRESS NOTES
CC:  Diabetes management/education    S/O: Kemi Irene is a 61 y.o. female referred by Dr. Cherrie Calderon, MUKESH for diabetes management. HPI:January 2017 weighed 45 lbs more. [facility] and scale was surprising- she was excited that she had lost so much weight. Went to GYN in April - urinary analysis showed sugar- doctor order labs - glucose tolerance test. Doctor called saying her numbers were high and she needed to see PCP. Her old PCP would not take her back because it had been too long and now he was no longer taking new patients. She then made an appointment with Leila Leon where she found out about the diagnosis. Patient has been extremely worried about her sugars being high. She immediately started metformin and checking her sugars. Current Diabetes Regimen:  Started checking sugars Tuesday  First meds were Sunday morning towards the end of breakfast  Metformin ER 500mg 2 tabs once daily at dinner  Glimepiride 1mg 1 tab every morning with breakfast    ROS:   Patient denies hypoglycemic and hyperglycemic signs/symptoms, chest pain, shortness of breath, neuropathy, vision changes, and any foot changes. Self-Monitoring Blood Glucose:  197  227  174  168    Diet:   cooks a lot so he's on board for changes  They've been looking up information online about diabetes and finding conflicting information  Eating more veggies and less carbs; trying to eat healthier carbs    Exercise:  Trying to do a lot more walking        Data reviewed:  Lab Results   Component Value Date/Time    Hemoglobin A1c (POC) 12.5 05/11/2018 04:41 PM         Assessment/Plan:     1. Diabetes:  a1c not at goal <7% however patient was just newly diagnosed. Has classic symptoms of hyperglycemia and was found to have glucose in her urine at GYN visit. Patient started taking metformin 2 tabs once daily/glimepiride 1mg once daily and has been checking her blood sugars.  Is already seeing some improvements there. Reviewed healthy meal planning, the plate method, increasing non-starchy vegetables and decreasing starchy vegetables. Also educated her about looking at food labels for total carb content vs just looking at sugar and total carb/meal goals. Patient to follow up with me in a few weeks along with Marla. Patient verbalized understanding of the information presented and all of the patients questions were answered. AVS was handed to the patient and information reviewed. Patient advised to call me or Dr. Bhavna Yoder NP with any additional questions or concerns. Follow-up: ~3 weeks  Notification of recommendations will be sent to Dr. Bhavna Yoder NP for review.       Hoa Baez, RamonD, BCPS, CDE

## 2018-05-18 NOTE — PROGRESS NOTES
Cynthia Quevedo is a 61 y.o. female    Chief Complaint   Patient presents with    Diabetes     1. Have you been to the ER, urgent care clinic since your last visit? Hospitalized since your last visit? No    2. Have you seen or consulted any other health care providers outside of the [facility] since your last visit? Include any pap smears or colon screening.  No     Health Maintenance Due   Topic Date Due    Hepatitis C Screening  1955    LIPID PANEL Q1  1955    FOOT EXAM Q1  03/07/1965    MICROALBUMIN Q1  03/07/1965    EYE EXAM RETINAL OR DILATED Q1  03/07/1965    Pneumococcal 19-64 Medium Risk (1 of 1 - PPSV23) 03/07/1974    DTaP/Tdap/Td series (1 - Tdap) 03/07/1976    FOBT Q 1 YEAR AGE 50-75  03/07/2005    ZOSTER VACCINE AGE 60>  01/07/2015    PAP AKA CERVICAL CYTOLOGY  06/29/2015

## 2018-06-08 ENCOUNTER — OFFICE VISIT (OUTPATIENT)
Dept: INTERNAL MEDICINE CLINIC | Age: 63
End: 2018-06-08

## 2018-06-08 ENCOUNTER — TELEPHONE (OUTPATIENT)
Dept: INTERNAL MEDICINE CLINIC | Age: 63
End: 2018-06-08

## 2018-06-08 VITALS
WEIGHT: 180 LBS | OXYGEN SATURATION: 98 % | SYSTOLIC BLOOD PRESSURE: 136 MMHG | BODY MASS INDEX: 33.13 KG/M2 | HEART RATE: 71 BPM | RESPIRATION RATE: 14 BRPM | HEIGHT: 62 IN | TEMPERATURE: 97.9 F | DIASTOLIC BLOOD PRESSURE: 78 MMHG

## 2018-06-08 DIAGNOSIS — Z23 ENCOUNTER FOR IMMUNIZATION: ICD-10-CM

## 2018-06-08 DIAGNOSIS — E11.9 TYPE 2 DIABETES MELLITUS WITHOUT COMPLICATION, WITHOUT LONG-TERM CURRENT USE OF INSULIN (HCC): ICD-10-CM

## 2018-06-08 DIAGNOSIS — E66.9 OBESITY (BMI 30-39.9): ICD-10-CM

## 2018-06-08 DIAGNOSIS — Z00.00 ROUTINE GENERAL MEDICAL EXAMINATION AT A HEALTH CARE FACILITY: Primary | ICD-10-CM

## 2018-06-08 DIAGNOSIS — Z11.59 SCREENING FOR VIRAL DISEASE: ICD-10-CM

## 2018-06-08 DIAGNOSIS — Z13.21 ENCOUNTER FOR VITAMIN DEFICIENCY SCREENING: ICD-10-CM

## 2018-06-08 DIAGNOSIS — E11.9 ENCOUNTER FOR DIABETIC FOOT EXAM (HCC): ICD-10-CM

## 2018-06-08 PROBLEM — Z85.828 HISTORY OF BASAL CELL CARCINOMA: Status: ACTIVE | Noted: 2018-06-08

## 2018-06-08 RX ORDER — METFORMIN HYDROCHLORIDE 500 MG/1
1500 TABLET, EXTENDED RELEASE ORAL
Qty: 60 TAB | Refills: 2 | Status: SHIPPED | OUTPATIENT
Start: 2018-06-08 | End: 2018-08-08 | Stop reason: SDUPTHER

## 2018-06-08 NOTE — PROGRESS NOTES
Subjective: Rangel Saucedo is a 61 y.o. female who presents today for CPE and follow up DM2    Was 11 minutes late to her appointment today     Seen 1 month ago to establish care after recent dx of DM2 by OBGYN Dr. Teodora Donaldson. poc HgbA1c at out visit was 12.5%  Started on metformin XR, take 1,000mg at dinner. Also taking glimeperide 1mg daily  Met with PharmD Mehnaz Moya for diet education   Has cut out sweet tea, simple carbs, and sweets. Has been logging her food intake, she has found this helpful  Checking BG at home, fasting. Most readings 120s-140s. This morning was 190- ate poorly yesterday (hamburger with bun and homemade lemonade, dinner with cauliflower pizza and salad and protein bar before going to bed)  Denies peripheral neuropathy, polyuria or polydypsia   Has been walking 1 mile most days    Denies any chest pain, palpitations, shortness of breath, cough, abdominal pain, bowel changes, blood in stool, difficulty urinating, headaches, or dizziness    Denies family history of breast or gyn cancers  Denies family history of heart disease    Follows with dermatologist Dr. Irene Spangler. Had a basal cell carcinoma on back several years ago. Last seen in 05/2018 for skin check      Health maintenance:   Last pap:  [facility] TRE[facility], [facility], will request copy  Last mammogram:  [facility] Segundo TREJO, [facility]   Last colonoscopy:   Every 5 years, believes she is due again in 2019, Dr. Florencio Chen, father with colon cancer.   Pt never had polyps  Last tetanus vaccination  Had once, made her too sore- refuses  Last pneumonia vaccination: never had  Shingles vaccination: never had    Patient Active Problem List    Diagnosis Date Noted    Type II diabetes mellitus ([facility] [address] 05/11/2018     Current Outpatient Prescriptions   Medication Sig Dispense Refill    Blood-Glucose Meter (ONETOUCH ULTRAMINI) monitoring kit Check BG 3x/day, Dx E11.9 1 Kit 0    Lancets misc Check BG 3x/day, Dx E11.9  OneTouch Miniultra 1 Each 11    glucose blood VI test strips (ASCENSIA AUTODISC VI, ONE TOUCH ULTRA TEST VI) strip Check BG 3x/day, Dx E11.9  OneTouch Miniultra 100 Strip 5    metFORMIN ER (GLUCOPHAGE XR) 500 mg tablet Take 2 Tabs by mouth daily (with dinner). 60 Tab 2    glimepiride (AMARYL) 1 mg tablet Take 1 Tab by mouth Daily (before breakfast). 30 Tab 2        Review of Systems    Pertinent items are noted in HPI. Objective:     Visit Vitals    /78 (BP 1 Location: Left arm, BP Patient Position: Sitting)    Pulse 71    Temp 97.9 °F (36.6 °C) (Oral)    Resp 14    Ht 5' 2\" (1.575 m)    Wt 180 lb (81.6 kg)    SpO2 98%    BMI 32.92 kg/m2     General:  Alert, cooperative, no distress, appears stated age. Head:  Normocephalic, without obvious abnormality, atraumatic. Eyes:  Conjunctivae/corneas clear. PERRL, EOMs intact   Ears:  Normal TMs and external ear canals both ears. Nose: Nares normal. Septum midline. Mucosa normal   Throat: Lips, mucosa, and tongue normal. Teeth and gums normal.   Neck: Supple, symmetrical, trachea midline, no adenopathy, thyroid: no enlargement/tenderness/nodules, no carotid bruit and no JVD. Back:   Symmetric, no curvature. ROM normal.   Lungs:   Clear to auscultation bilaterally. Chest wall:  No tenderness or deformity. Heart:  Regular rate and rhythm, S1, S2 normal, no murmur, click, rub or gallop. Breast Exam:  No tenderness, masses, or nipple abnormality. Abdomen:   Soft, non-tender. Bowel sounds normal. No masses,  No organomegaly. Extremities: Extremities normal, atraumatic, no cyanosis or edema. Pulses: 2+ and symmetric all extremities. Skin: Skin color and turgor normal.  Sun damaged skin, numerous skin lesions on back   Lymph nodes: Cervical, supraclavicular, and axillary nodes normal.   Neurologic: CNII-XII intact. Normal strength, sensation throughout.      Diabetic foot exam:     Left Foot:   Visual Exam: normal Pulse DP: 2+ (normal)   Filament test: 2/6   Vibratory sensation: normal      Right Foot:   Visual Exam: normal    Pulse DP: 2+ (normal)   Filament test: normal sensation    Vibratory sensation: normal          Assessment/Plan:     1. Routine general medical examination at a health care facility  - health maintenance addressed  - AMB POC EKG ROUTINE W/ 12 LEADS, INTER & REP  - MICROALBUMIN, UR, RAND W/ MICROALB/CREAT RATIO  - METABOLIC PANEL, COMPREHENSIVE  - CBC WITH AUTOMATED DIFF  - LIPID PANEL  - VITAMIN D, 25 HYDROXY  - TSH RFX ON ABNORMAL TO FREE T4    2. Type 2 diabetes mellitus without complication, without long-term current use of insulin (HCC)  - increase metformin xr to 1500mg bedtime  - cont glimeperide  - cont diet changes  - increase activity/walking, goal to now get up to 3 miles a day  - MICROALBUMIN, UR, RAND W/ MICROALB/CREAT RATIO  - LIPID PANEL    3. Encounter for diabetic foot exam ([facility] [address]  - completed, normal    4. Obesity (BMI 30-39.9)  - cont diet changes  - increase activity/walking, goal to now get up to 3 miles a day  - METABOLIC PANEL, COMPREHENSIVE  - CBC WITH AUTOMATED DIFF  - LIPID PANEL  - TSH RFX ON ABNORMAL TO FREE T4    5. Encounter for vitamin deficiency screening  - VITAMIN D, 25 HYDROXY    6. Screening for viral disease  - HEPATITIS C AB    7. Encounter for immunization  - patient to have shingrix administered at pharmacy  - Pneumococcal polysaccharide vaccine, 23-valent, adult or immunosuppressed pt dose  - MA IMMUNIZ ADMIN,1 SINGLE/COMB VAC/TOXOID      Advised her to call back or return to office if symptoms worsen/change/persist.  Discussed expected course/resolution/complications of diagnosis in detail with patient. Medication risks/benefits/costs/interactions/alternatives discussed with patient. She was given an after visit summary which includes diagnoses, current medications, & vitals. She expressed understanding with the diagnosis and plan.     Antonio Friedman FNP

## 2018-06-08 NOTE — PROGRESS NOTES
Patient's identity verified with two patient identifiers (name and date of birth). Reviewed record in preparation for visit and have obtained necessary documentation. 1. Have you been to the ER, urgent care clinic since your last visit? Hospitalized since your last visit? No  2. Have you seen or consulted any other health care providers outside of the [address] [place] [address] since your last visit? Include any pap smears or colon screening. No    Chief Complaint   Patient presents with    Complete Physical     Not fasting. Requesting Hep C testing.  Diabetes     4 wk follow up, taking medications w/o difficulty. Foot exam due. Not fasting. Health Maintenance Due   Topic Date Due    Hepatitis C Screening   Has not had. 1955    LIPID PANEL Q1   due 1955    FOOT EXAM Q1   Due, does not have a podiatrist 03/07/1965    MICROALBUMIN Q1   due 03/07/1965    EYE EXAM RETINAL OR DILATED Q1   Done per pt, last x3yrs ago w/ Dr. Sunshine Ser. 03/07/1965    Pneumococcal 19-64 Medium Risk (1 of 1 - PPSV23)  Has not had 03/07/1974    DTaP/Tdap/Td series (1 - Tdap)  Has not had, has reactions to this. 03/07/1976    FOBT Q 1 YEAR AGE 50-75   Has not had 03/07/2005    ZOSTER VACCINE AGE 60>   Has not had. 01/07/2015    PAP AKA CERVICAL CYTOLOGY   done per Dr. Merlinda Baller, [facility].  06/29/2015       Wt Readings from Last 3 Encounters:   06/08/18 180 lb (81.6 kg)   05/18/18 179 lb 9.6 oz (81.5 kg)   05/11/18 179 lb 6.4 oz (81.4 kg)     Temp Readings from Last 3 Encounters:   06/08/18 97.9 °F (36.6 °C) (Oral)   05/18/18 97.5 °F (36.4 °C) (Oral)   05/11/18 98.5 °F (36.9 °C) (Oral)     BP Readings from Last 3 Encounters:   06/08/18 136/78   05/18/18 138/78   05/11/18 134/84     Pulse Readings from Last 3 Encounters:   06/08/18 71   05/18/18 77   05/11/18 79       Depression Screening:  :     PHQ over the last two weeks 6/8/2018   Little interest or pleasure in doing things Not at all   Feeling down, depressed or hopeless Not at all   Total Score PHQ 2 0         Abuse Screening:  :     Abuse Screening Questionnaire 6/8/2018   Do you ever feel afraid of your partner? N   Are you in a relationship with someone who physically or mentally threatens you? N   Is it safe for you to go home? Y     Patient opts for [id number] Vaccine today in office, per receiving order from provider Dar Natarajan NP. All questions answered, consent form signed, and VIS given. 0.5 ML given in right deltoid, IM route, without difficulty, patient tolerated well. Patient was monitored for 15 minutes after administration with no complications.     LOT: [id number]  EXP: 10/26/19  : [employer]  NDC: 0681-4692-26  SITE: RIGHT DELTOID  ROUTE: INTRAMUSCULAR

## 2018-06-08 NOTE — TELEPHONE ENCOUNTER
Patient in office for visit today 6/8/18 with Neel Murray NP, signed medical release form for multiple locations. Faxes sent, confirmations received, and placed to be scanned into chart. Matty Rodriges    Physician           Specialty     Colon and Rectal Surgery       Primary Contact Information      Phone Fax E-mail Address     [phone] Not available. [address] [address]      [facility]   [facility] [address]    External Physician           [facility]     [facility]       Primary Contact Information      Phone Fax E-mail Address     [phone] Not available. [address]    External Physician           Specialty     Obstetrics & Gynecology, Gynecology, Obstetrics       Primary Contact Information      Phone Fax E-mail Address     [phone] Not available.  [address] [facility]

## 2018-06-08 NOTE — MR AVS SNAPSHOT
[address] [place] [address], [address] [place] [address] 
[phone] Patient: Danyell Ratliff MRN:  [id number] Visit Information Date & Time Provider Department Dept. Phone Encounter #  
 6/8/2018  8:20 AM [facility] [phone] [id number] Follow-up Instructions Return in about 2 months (around 8/8/2018) for f/u DM2. Upcoming Health Maintenance Date Due Hepatitis C Screening 1955 LIPID PANEL Q1 1955 MICROALBUMIN Q1 3/7/1965 EYE EXAM RETINAL OR DILATED Q1 3/7/1965 Pneumococcal 19-64 Medium Risk (1 of 1 - PPSV23) 3/7/1974 FOBT Q 1 YEAR AGE 50-75 3/7/2005 ZOSTER VACCINE AGE 60> 1/7/2015 PAP AKA CERVICAL CYTOLOGY 6/29/2015 DTaP/Tdap/Td series (1 - Tdap) 6/1/2028* Influenza Age 5 to Adult 8/1/2018 HEMOGLOBIN A1C Q6M 11/11/2018 FOOT EXAM Q1 6/8/2019 BREAST CANCER SCRN MAMMOGRAM 4/19/2020 *Topic was postponed. The date shown is not the original due date. Allergies as of 6/8/2018  Review Complete On: 6/8/2018 By: Nely Padilla NP Severity Noted Reaction Type Reactions Codeine  05/11/2018    Other (comments) Past out Tetanus And Diphther. Tox (Pf)  05/11/2018    Swelling Current Immunizations  Never Reviewed Name Date Pneumococcal Polysaccharide (PPSV-23)  Incomplete Not reviewed this visit You Were Diagnosed With   
  
 Codes Comments Routine general medical examination at a health care facility    -  Primary ICD-10-CM: Z00.00 ICD-9-CM: V70.0 Type 2 diabetes mellitus without complication, without long-term current use of insulin (HCC)     ICD-10-CM: E11.9 ICD-9-CM: 250.00 Obesity (BMI 30-39. 9)     ICD-10-CM: E66.9 ICD-9-CM: 278.00 Screening for viral disease     ICD-10-CM: Z11.59 
ICD-9-CM: V73.99 Encounter for vitamin deficiency screening     ICD-10-CM: Z13.21 ICD-9-CM: V77.99   
 Encounter for immunization     ICD-10-CM: Z57 ICD-9-CM: V03.89 Vitals BP Pulse Temp Resp Height(growth percentile) Weight(growth percentile) 136/78 (BP 1 Location: Left arm, BP Patient Position: Sitting) 71 97.9 °F (36.6 °C) (Oral) 14 5' 2\" (1.575 m) 180 lb (81.6 kg) SpO2 BMI OB Status Smoking Status 98% 32.92 kg/m2 Hysterectomy Never Smoker Vitals History BMI and BSA Data Body Mass Index Body Surface Area  
 32.92 kg/m 2 1.89 m 2 Preferred Pharmacy Pharmacy Name Phone [id number] [address] [phone] Your Updated Medication List  
  
   
This list is accurate as of 18  9:30 AM.  Always use your most recent med list.  
  
  
  
  
 Blood-Glucose Meter monitoring kit Commonly known as:  Pina Abernathy Check BG 3x/day, Dx E11.9  
  
 glimepiride 1 mg tablet Commonly known as:  AMARYL Take 1 Tab by mouth Daily (before breakfast). glucose blood VI test strips strip Commonly known as:  ASCENSIA AUTODISC VI, ONE TOUCH ULTRA TEST VI Check BG 3x/day, Dx E11.9 OneTouch Miniultra Lancets Misc Check BG 3x/day, Dx E11.9 OneTouch Miniultra  
  
 metFORMIN  mg tablet Commonly known as:  GLUCOPHAGE XR Take 2 Tabs by mouth daily (with dinner). varicella-zoster recombinant (PF) 50 mcg/0.5 mL Susr injection Commonly known as:  SHINGRIX  
0.5 mL by IntraMUSCular route once for 1 dose. Prescriptions Printed Refills  
 varicella-zoster recombinant, PF, (SHINGRIX) 50 mcg/0.5 mL susr injection 1 Si.5 mL by IntraMUSCular route once for 1 dose. Class: Print Route: IntraMUSCular We Performed the Following AMB POC EKG ROUTINE W/ 12 LEADS, INTER & REP [63379 CPT(R)] CBC WITH AUTOMATED DIFF [68976 CPT(R)] HEPATITIS C AB [37421 CPT(R)] LIPID PANEL [93464 CPT(R)] METABOLIC PANEL, COMPREHENSIVE [50626 CPT(R)] MICROALBUMIN, UR, RAND W/ MICROALB/CREAT RATIO [id number] CPT(R)] PNEUMOCOCCAL POLYSACCHARIDE VACCINE, 23-VALENT, ADULT OR IMMUNOSUPPRESSED PT DOSE, [06417 CPT(R)] [place] IMMUNIZ ADMIN,1 SINGLE/COMB VAC/TOXOID [id number] CPT(R)] TSH RFX ON ABNORMAL TO FREE T4 [[id number] Custom] VITAMIN D, 25 HYDROXY [id number] CPT(R)] Follow-up Instructions Return in about 2 months (around 8/8/2018) for f/u DM2. Patient Instructions Have your Shingles (Shringrix) vaccination administered at your pharmacy Have your fasting lab work drawn at [facility] Final your eye exam, please given them the form I have provided to you today and have them send it back to me Vaccine Information Statement Pneumococcal Polysaccharide Vaccine: What You Need to Know Many Vaccine Information Statements are available in Lao and other languages. See www.immunize.org/vis. Hojas de información Sobre Vacunas están disponibles en español y en muchos otros idiomas. Visite [email or web address]. 1. Why get vaccinated? Vaccination can protect older adults (and some children and younger adults) from pneumococcal disease. Pneumococcal disease is caused by bacteria that can spread from person to person through close contact. It can cause ear infections, and it can also lead to more serious infections of the: 
 Lungs (pneumonia),  Blood (bacteremia), and 
 Covering of the brain and spinal cord (meningitis). Meningitis can cause deafness and brain damage, and it can be fatal.   
 
Anyone can get pneumococcal disease, but children under 3years of age, people with certain medical conditions, adults over 72years of age, and cigarette smokers are at the highest risk. About 18,000 older adults die each year from pneumococcal disease in the [place]. Treatment of pneumococcal infections with penicillin and other drugs used to be more effective.  But some strains of the disease have become resistant to these drugs. This makes prevention of the disease, through vaccination, even more important. 2. Pneumococcal polysaccharide vaccine (PPSV23) Pneumococcal polysaccharide vaccine (PPSV23) protects against 23 types of pneumococcal bacteria. It will not prevent all pneumococcal disease. PPSV23 is recommended for:  All adults 72years of age and older,  Anyone 2 through 59years of age with certain long-term health problems, 
 Anyone 2 through 59years of age with a weakened immune system, 
 Adults 23 through 59years of age who smoke cigarettes or have asthma. Most people need only one dose of PPSV. A second dose is recommended for certain high-risk groups. People 72 and older should get a dose even if they have gotten one or more doses of the vaccine before they turned 65. Your healthcare provider can give you more information about these recommendations. Most healthy adults develop protection within 2 to 3 weeks of getting the shot. 3. Some people should not get this vaccine  Anyone who has had a life-threatening allergic reaction to PPSV should not get another dose.  Anyone who has a severe allergy to any component of PPSV should not receive it. Tell your provider if you have any severe allergies.  Anyone who is moderately or severely ill when the shot is scheduled may be asked to wait until they recover before getting the vaccine. Someone with a mild illness can usually be vaccinated.  Children less than 3years of age should not receive this vaccine.  There is no evidence that PPSV is harmful to either a pregnant woman or to her fetus. However, as a precaution, women who need the vaccine should be vaccinated before becoming pregnant, if possible. 4. Risks of a vaccine reaction With any medicine, including vaccines, there is a chance of side effects. These are usually mild and go away on their own, but serious reactions are also possible. About half of people who get PPSV have mild side effects, such as redness or pain where the shot is given, which go away within about two days. Less than 1 out of 100 people develop a fever, muscle aches, or more severe local reactions. Problems that could happen after any vaccine:  People sometimes faint after a medical procedure, including vaccination. Sitting or lying down for about 15 minutes can help prevent fainting, and injuries caused by a fall. Tell your doctor if you feel dizzy, or have vision changes or ringing in the ears.  Some people get severe pain in the shoulder and have difficulty moving the arm where a shot was given. This happens very rarely.  Any medication can cause a severe allergic reaction. Such reactions from a vaccine are very rare, estimated at about 1 in a million doses, and would happen within a few minutes to a few hours after the vaccination. As with any medicine, there is a very remote chance of a vaccine causing a serious injury or death. The safety of vaccines is always being monitored. For more information, visit: www.cdc.gov/vaccinesafety/  
 
5. What if there is a serious reaction? What should I look for? Look for anything that concerns you, such as signs of a severe allergic reaction, very high fever, or unusual behavior. Signs of a severe allergic reaction can include hives, swelling of the face and throat, difficulty breathing, a fast heartbeat, dizziness, and weakness. These would usually start a few minutes to a few hours after the vaccination. What should I do? If you think it is a severe allergic reaction or other emergency that cant wait, call 9-1-1 or get to the nearest hospital. Otherwise, call your doctor. Afterward, the reaction should be reported to the Vaccine Adverse Event Reporting System (VAERS). Your doctor might file this report, or you can do it yourself through the VAERS web site at www.vaers. hhs.gov, or by calling 8-176.743.4609. [facility] does not give medical advice. 6. How can I learn more?  Ask your doctor. He or she can give you the vaccine package insert or suggest other sources of information.  Call your local or state health department.  Contact the Centers for Disease Control and Prevention (CDC): 
- Call 9-274.570.7567 (1-800-CDC-INFO) or 
- Visit CDCs website at www.cdc.gov/vaccines Vaccine Information Statement PPSV  
04/24/2015 Department of [facility] and [employer] Centers for Disease Control and Prevention Office Use Only Introducing [facility] [place][note 2[place]! Dear Fritz Obregon: Thank you for requesting a Platfora account. Our records indicate that you already have an active Platfora account. You can access your account anytime at [email or web address] Did you know that you can access your hospital and ER discharge instructions at any time in Platfora? You can also review all of your test results from your hospital stay or ER visit. Additional Information If you have questions, please visit the Frequently Asked Questions section of the Plat[employer]a website at [email or web address] Remember, Platfora is NOT to be used for urgent needs. For medical emergencies, dial 911. Now available from your iPhone and Android! Please provide this summary of care documentation to your next provider. Your primary care clinician is listed as Alexy Melvin. If you have any questions after today's visit, please call [phone].

## 2018-06-08 NOTE — PATIENT INSTRUCTIONS
Have your Shingles (Shringrix) vaccination administered at your pharmacy    Have your fasting lab work drawn at [facility]    Schedule your eye exam, please given them the form I have provided to you today and have them send it back to me        Vaccine Information Statement    Pneumococcal Polysaccharide Vaccine: What You Need to Know    Many Vaccine Information Statements are available in Macedonian and other languages. See www.immunize.org/vis. Hojas de información Sobre Vacunas están disponibles en español y en muchos otros idiomas. Visite [email or web address]. 1. Why get vaccinated? Vaccination can protect older adults (and some children and younger adults) from pneumococcal disease. Pneumococcal disease is caused by bacteria that can spread from person to person through close contact. It can cause ear infections, and it can also lead to more serious infections of the:   Lungs (pneumonia),   Blood (bacteremia), and   Covering of the brain and spinal cord (meningitis). Meningitis can cause deafness and brain damage, and it can be fatal.      Anyone can get pneumococcal disease, but children under 3years of age, people with certain medical conditions, adults over 72years of age, and cigarette smokers are at the highest risk. About 18,000 older adults die each year from pneumococcal disease in the [place]. Treatment of pneumococcal infections with penicillin and other drugs used to be more effective. But some strains of the disease have become resistant to these drugs. This makes prevention of the disease, through vaccination, even more important. 2. Pneumococcal polysaccharide vaccine (PPSV23)    Pneumococcal polysaccharide vaccine (PPSV23) protects against 23 types of pneumococcal bacteria. It will not prevent all pneumococcal disease.     PPSV23 is recommended for:   All adults 72years of age and older,   Anyone 2 through 59years of age with certain long-term health problems,   Anyone 2 through 59years of age with a weakened immune system,   Adults 23 through 59years of age who smoke cigarettes or have asthma. Most people need only one dose of PPSV. A second dose is recommended for certain high-risk groups. People 72 and older should get a dose even if they have gotten one or more doses of the vaccine before they turned 65. Your healthcare provider can give you more information about these recommendations. Most healthy adults develop protection within 2 to 3 weeks of getting the shot. 3. Some people should not get this vaccine     Anyone who has had a life-threatening allergic reaction to PPSV should not get another dose.  Anyone who has a severe allergy to any component of PPSV should not receive it. Tell your provider if you have any severe allergies.  Anyone who is moderately or severely ill when the shot is scheduled may be asked to wait until they recover before getting the vaccine. Someone with a mild illness can usually be vaccinated.  Children less than 3years of age should not receive this vaccine.  There is no evidence that PPSV is harmful to either a pregnant woman or to her fetus. However, as a precaution, women who need the vaccine should be vaccinated before becoming pregnant, if possible. 4. Risks of a vaccine reaction    With any medicine, including vaccines, there is a chance of side effects. These are usually mild and go away on their own, but serious reactions are also possible. About half of people who get PPSV have mild side effects, such as redness or pain where the shot is given, which go away within about two days. Less than 1 out of 100 people develop a fever, muscle aches, or more severe local reactions. Problems that could happen after any vaccine:     People sometimes faint after a medical procedure, including vaccination.  Sitting or lying down for about 15 minutes can help prevent fainting, and injuries caused by a fall. Tell your doctor if you feel dizzy, or have vision changes or ringing in the ears.  Some people get severe pain in the shoulder and have difficulty moving the arm where a shot was given. This happens very rarely.  Any medication can cause a severe allergic reaction. Such reactions from a vaccine are very rare, estimated at about 1 in a million doses, and would happen within a few minutes to a few hours after the vaccination. As with any medicine, there is a very remote chance of a vaccine causing a serious injury or death. The safety of vaccines is always being monitored. For more information, visit: www.cdc.gov/vaccinesafety/     5. What if there is a serious reaction? What should I look for? Look for anything that concerns you, such as signs of a severe allergic reaction, very high fever, or unusual behavior. Signs of a severe allergic reaction can include hives, swelling of the face and throat, difficulty breathing, a fast heartbeat, dizziness, and weakness. These would usually start a few minutes to a few hours after the vaccination. What should I do? If you think it is a severe allergic reaction or other emergency that cant wait, call 9-1-1 or get to the nearest hospital. Otherwise, call your doctor. Afterward, the reaction should be reported to the Vaccine Adverse Event Reporting System (VAERS). Your doctor might file this report, or you can do it yourself through the VAERS web site at www.vaers. hhs.gov, or by calling 2-149.480.8670. VAERS does not give medical advice. 6. How can I learn more?  Ask your doctor. He or she can give you the vaccine package insert or suggest other sources of information.  Call your local or state health department.    Contact the Centers for Disease Control and Prevention (CDC):  - Call 1-478.462.8240 (8-511-WPT-INFO) or  - Visit CDCs website at www.cdc.gov/vaccines    [address] 04/24/2015    Department of Health and Human Services  Centers for Disease Control and Prevention    Office Use Only

## 2018-06-12 PROBLEM — Z98.890 HISTORY OF COLONOSCOPY: Status: ACTIVE | Noted: 2018-06-12

## 2018-06-12 PROBLEM — K57.90 DIVERTICULOSIS: Status: ACTIVE | Noted: 2018-06-12

## 2018-06-14 PROBLEM — L57.0 ACTINIC KERATOSIS: Status: ACTIVE | Noted: 2018-06-14

## 2018-08-03 ENCOUNTER — OFFICE VISIT (OUTPATIENT)
Dept: INTERNAL MEDICINE CLINIC | Age: 63
End: 2018-08-03

## 2018-08-03 VITALS
BODY MASS INDEX: 34.16 KG/M2 | RESPIRATION RATE: 18 BRPM | HEART RATE: 84 BPM | OXYGEN SATURATION: 97 % | HEIGHT: 60 IN | SYSTOLIC BLOOD PRESSURE: 138 MMHG | DIASTOLIC BLOOD PRESSURE: 78 MMHG | TEMPERATURE: 97.8 F | WEIGHT: 174 LBS

## 2018-08-03 DIAGNOSIS — R01.1 SYSTOLIC MURMUR: ICD-10-CM

## 2018-08-03 DIAGNOSIS — E11.9 TYPE 2 DIABETES MELLITUS WITHOUT COMPLICATION, WITHOUT LONG-TERM CURRENT USE OF INSULIN (HCC): Primary | ICD-10-CM

## 2018-08-03 DIAGNOSIS — Z85.828 HISTORY OF BASAL CELL CARCINOMA: ICD-10-CM

## 2018-08-03 PROBLEM — E78.49 OTHER HYPERLIPIDEMIA: Status: ACTIVE | Noted: 2018-08-03

## 2018-08-03 PROBLEM — E55.9 VITAMIN D INSUFFICIENCY: Status: ACTIVE | Noted: 2018-08-03

## 2018-08-03 LAB
25(OH)D3+25(OH)D2 SERPL-MCNC: 24.5 NG/ML (ref 30–100)
ALBUMIN SERPL-MCNC: 4.2 G/DL (ref 3.6–4.8)
ALBUMIN/CREAT UR: 27.6 MG/G CREAT (ref 0–30)
ALBUMIN/GLOB SERPL: 1.5 {RATIO} (ref 1.2–2.2)
ALP SERPL-CCNC: 81 IU/L (ref 39–117)
ALT SERPL-CCNC: 13 IU/L (ref 0–32)
AST SERPL-CCNC: 12 IU/L (ref 0–40)
BASOPHILS # BLD AUTO: 0.1 X10E3/UL (ref 0–0.2)
BASOPHILS NFR BLD AUTO: 1 %
BILIRUB SERPL-MCNC: 0.2 MG/DL (ref 0–1.2)
BUN SERPL-MCNC: 13 MG/DL (ref 8–27)
BUN/CREAT SERPL: 17 (ref 12–28)
CALCIUM SERPL-MCNC: 10 MG/DL (ref 8.7–10.3)
CHLORIDE SERPL-SCNC: 101 MMOL/L (ref 96–106)
CHOLEST SERPL-MCNC: 190 MG/DL (ref 100–199)
CO2 SERPL-SCNC: 24 MMOL/L (ref 20–29)
CREAT SERPL-MCNC: 0.75 MG/DL (ref 0.57–1)
CREAT UR-MCNC: 120.3 MG/DL
EOSINOPHIL # BLD AUTO: 0.2 X10E3/UL (ref 0–0.4)
EOSINOPHIL NFR BLD AUTO: 2 %
ERYTHROCYTE [DISTWIDTH] IN BLOOD BY AUTOMATED COUNT: 13.8 % (ref 12.3–15.4)
GLOBULIN SER CALC-MCNC: 2.8 G/DL (ref 1.5–4.5)
GLUCOSE SERPL-MCNC: 153 MG/DL (ref 65–99)
HBA1C MFR BLD HPLC: 6.9 %
HCT VFR BLD AUTO: 44.3 % (ref 34–46.6)
HCV AB S/CO SERPL IA: <0.1 S/CO RATIO (ref 0–0.9)
HDLC SERPL-MCNC: 49 MG/DL
HGB BLD-MCNC: 14.4 G/DL (ref 11.1–15.9)
IMM GRANULOCYTES # BLD: 0 X10E3/UL (ref 0–0.1)
IMM GRANULOCYTES NFR BLD: 0 %
INTERPRETATION, 910389: NORMAL
LDLC SERPL CALC-MCNC: 121 MG/DL (ref 0–99)
LYMPHOCYTES # BLD AUTO: 3.6 X10E3/UL (ref 0.7–3.1)
LYMPHOCYTES NFR BLD AUTO: 33 %
Lab: NORMAL
MCH RBC QN AUTO: 29.7 PG (ref 26.6–33)
MCHC RBC AUTO-ENTMCNC: 32.5 G/DL (ref 31.5–35.7)
MCV RBC AUTO: 91 FL (ref 79–97)
MICROALBUMIN UR-MCNC: 33.2 UG/ML
MONOCYTES # BLD AUTO: 0.6 X10E3/UL (ref 0.1–0.9)
MONOCYTES NFR BLD AUTO: 5 %
NEUTROPHILS # BLD AUTO: 6.6 X10E3/UL (ref 1.4–7)
NEUTROPHILS NFR BLD AUTO: 59 %
PLATELET # BLD AUTO: 296 X10E3/UL (ref 150–379)
POTASSIUM SERPL-SCNC: 4.5 MMOL/L (ref 3.5–5.2)
PROT SERPL-MCNC: 7 G/DL (ref 6–8.5)
RBC # BLD AUTO: 4.85 X10E6/UL (ref 3.77–5.28)
SODIUM SERPL-SCNC: 140 MMOL/L (ref 134–144)
TRIGL SERPL-MCNC: 100 MG/DL (ref 0–149)
TSH SERPL DL<=0.005 MIU/L-ACNC: 2.14 UIU/ML (ref 0.45–4.5)
VLDLC SERPL CALC-MCNC: 20 MG/DL (ref 5–40)
WBC # BLD AUTO: 11 X10E3/UL (ref 3.4–10.8)

## 2018-08-03 NOTE — PROGRESS NOTES
Subjective: Damari De Los Santos is a 61 y.o. female who presents today for f/u DM2    DM2:  Recent diagnosis with HgbA1c 12.5%  Taking metformin XR 1,000mg daily and glimepiride 1mg daily  Has been tracking food intake and working with Karin Odell for DM2 diet education   Fasting BGs running 100s- 110s  poc HgbA1c today 6.9%    Has changed her diet  Breakfast: egg and toast (natures own whole wheat) and 1 small can of B8  Am or Pm Snack: carb master yogurt, pecans or wlanuts, apple  Lunch: homemade tuna fish sandwich or with crackers, watching serving size and portion size  Dinner: has had stouffers lassagna but limits her serving size, grilled chicken,large salads or 2 vegetables- no starch    Has had some restlessness of her legs from knees down to feet, occasional, only at night. Usually only occurs if had done a lot of physical activity that day  No numbness or tingling of extremities    Tries to walk 1 mile a day     Health maintenance:   Last pap:  [facility], Dr. Jag Lozada, [facility]   Last mammogram:  Dr. Jag BURRELL, [facility]   Last colonoscopy:   Every 5 years, 11/2014- due again in 2019. Dr. David Fleming, family hist colon cancer  Last tetanus vaccination  refuses   Last pneumonia vaccination: Ender Corral 13 due 06/2019  Shingles vaccination: refuses     Patient Active Problem List    Diagnosis Date Noted    Vitamin D insufficiency 08/03/2018    Other hyperlipidemia 08/03/2018    Actinic keratosis 06/14/2018    History of colonoscopy 06/12/2018    Diverticulosis 06/12/2018    History of basal cell carcinoma 06/08/2018    Type II diabetes mellitus ([facility] [address] 05/11/2018     Current Outpatient Prescriptions   Medication Sig Dispense Refill    metFORMIN ER (GLUCOPHAGE XR) 500 mg tablet Take 3 Tabs by mouth daily (with dinner).  60 Tab 2    Blood-Glucose Meter (ONETOUCH ULTRAMINI) monitoring kit Check BG 3x/day, Dx E11.9 1 Kit 0    Lancets misc Check BG 3x/day, Dx E11.9  OneTouch Miniultra 1 Each 11    glucose blood VI test strips (ASCENSIA AUTODISC VI, ONE TOUCH ULTRA TEST VI) strip Check BG 3x/day, Dx E11.9  OneTouch Miniultra 100 Strip 5    glimepiride (AMARYL) 1 mg tablet Take 1 Tab by mouth Daily (before breakfast). 30 Tab 2        Review of Systems    Pertinent items are noted in HPI. Objective:     Visit Vitals    /78 (BP 1 Location: Right arm, BP Patient Position: Sitting)    Pulse 84    Temp 97.8 °F (36.6 °C) (Oral)    Resp 18    Ht 5' (1.524 m)    Wt 174 lb (78.9 kg)    SpO2 97%    BMI 33.98 kg/m2     General appearance: alert, cooperative, no distress, appears stated age  Head: Normocephalic, without obvious abnormality, atraumatic  Lungs: clear to auscultation bilaterally  Heart: regular rate and rhythm, 2/6 systolic murmur  Extremities: extremities normal, atraumatic, no cyanosis or edema, steady gait  Skin: Skin color, texture, turgor normal  Neurologic: Grossly normal  Psych: talkative, cooperative    Assessment/Plan:     1. Type 2 diabetes mellitus without complication, without long-term current use of insulin (HCC)  - cont metformin and glimepiride  - cont diet changes  - cont fasting BG checks and recording  - cont working with Karin Horton  - AMB POC HEMOGLOBIN A1C    2. Systolic murmur  - educated patient on murmurs and further evalaution  - encouraged continued diet changes and weight loss  - 2D ECHO COMPLETE ADULT (TTE) W OR WO CONTR; Future    3. History of basal cell carcinoma  - REFERRAL TO DERMATOLOGY      Advised her to call back or return to office if symptoms worsen/change/persist.  Discussed expected course/resolution/complications of diagnosis in detail with patient. Medication risks/benefits/costs/interactions/alternatives discussed with patient. She was given an after visit summary which includes diagnoses, current medications, & vitals. She expressed understanding with the diagnosis and plan.     Marta Valdes, FNP

## 2018-08-03 NOTE — PATIENT INSTRUCTIONS
Schedule your eye exam, let them know that you are a newly diagnosed diabetic    Schedule your Echocardiogram ([phone])

## 2018-08-03 NOTE — PROGRESS NOTES
Reviewed record in preparation for visit and have obtained necessary documentation. Identified pt with two pt identifiers(name and ).     Chief Complaint   Patient presents with    Diabetes     2 month f/u       Health Maintenance Due   Topic Date Due    Cholesterol Test   1955    Albumin Urine Test  1965    Eye Exam  1965    Stool testing for trace blood  2005    Shingles Vaccine  2015    Cervical Cancer Screening  2015    Flu Vaccine  2018       Coordination of Care Questionnaire:  :     1) Have you been to an emergency room, urgent care clinic since your last visit? no   Hospitalized since your last visit? no             2) Have you seen or consulted any other health care providers outside of [address] [place] [address] since your last visit? no  (Include any pap smears or colon screenings in this section.)

## 2018-08-03 NOTE — PROGRESS NOTES
CC:  Diabetes management/education    S/O: Heraclio Muhammad is a 61 y.o. female referred by Dr. Bruno Lombardi NP for diabetes management. HPI: Patient presents today for 2 month follow up. She had an appointment with NP Evelyn Mendoza directly prior to my appointment where it was found her a1c was down from 12% to 6.9%    Current Diabetes Regimen:  Metformin ER 500mg 3 tabs daily  Glimepiride 1mg once daily    ROS:   Patient denies hypoglycemic and hyperglycemic signs/symptoms, chest pain, shortness of breath, neuropathy, vision changes, and any foot changes. Self-Monitoring Blood Glucose:  Almost all blood sugars <120 and some even below 100. Majority in the 100's/110's      Diet:  Journaled all of her food for the past month and has had a pretty well rounded diet. Shes reading labels and counting carbs on everything. Portion control has been huge. Still eats out a lot but tries to get lots of non-starchy vegetables and protein. Occasionally will want a sweet so has nilla wafers (1 serviing size), very small container of ice cream (single serving) or serving of talita crackers. Exercise:  Still walking 1 mile most days        Data reviewed:  Lab Results   Component Value Date/Time    Hemoglobin A1c (POC) 6.9 08/03/2018 10:44 AM         Diabetes Health Maintenance:  Last eye exam: referred today  Last foot exam: 6/8/18  Last influenza vaccine: recommended this fall  Last Pneumovax 23 vaccine: given today  Last Prevnar-13 vaccine: recommended at 65  Hepatitis B Series: unknown  ASA Therapy: none  ACE/ARB Therapy: none  Statin Therapy: none    Assessment/Plan:     1. Diabetes:  a1c at goal <7% and fasting blood sugars are also in goal range of . Congratulated patient on her progress and encouraged her to continue the positive changes she has made in her life. Patient to follow up with me in 2 months and Marla in 4 months.         Patient verbalized understanding of the information presented and all of the patients questions were answered. AVS was handed to the patient and information reviewed. Patient advised to call me or Dr. Ronny Ayala NP with any additional questions or concerns. Follow-up: 2 months  Notification of recommendations will be sent to Dr. Ronny Ayala NP for review.       Rosalie Diego, Karin, BCPS, CDE

## 2018-08-03 NOTE — MR AVS SNAPSHOT
[address] [facility], [address] [place] [address] 
[phone] Patient: India Harp MRN:  [id number] Visit Information Date & Time Provider Department Dept. Phone Encounter #  
 8/3/2018 10:00 AM [facility] [phone] [id number] Follow-up Instructions Return in about 4 months (around 12/3/2018) for DM2 f/u. Upcoming Health Maintenance Date Due  
 EYE EXAM RETINAL OR DILATED Q1 3/7/1965 Influenza Age 5 to Adult 10/1/2018* ZOSTER VACCINE AGE 60> 8/1/2019* DTaP/Tdap/Td series (1 - Tdap) 6/1/2028* HEMOGLOBIN A1C Q6M 11/11/2018 FOOT EXAM Q1 6/8/2019 MICROALBUMIN Q1 8/2/2019 LIPID PANEL Q1 8/2/2019 COLONOSCOPY 11/19/2019 BREAST CANCER SCRN MAMMOGRAM 4/19/2020 PAP AKA CERVICAL CYTOLOGY 5/1/2021 *Topic was postponed. The date shown is not the original due date. Allergies as of 8/3/2018  Review Complete On: 8/3/2018 By: Jose Eduardo Warren NP Severity Noted Reaction Type Reactions Codeine  05/11/2018    Other (comments) Past out Tetanus And Diphther. Tox (Pf)  05/11/2018    Swelling Current Immunizations  Reviewed on 6/8/2018 Name Date Pneumococcal Polysaccharide (PPSV-23) 6/8/2018 Not reviewed this visit You Were Diagnosed With   
  
 Codes Comments Type 2 diabetes mellitus without complication, without long-term current use of insulin (HCC)    -  Primary ICD-10-CM: E11.9 ICD-9-CM: 250.00 History of basal cell carcinoma     ICD-10-CM: Y23.907 ICD-9-CM: V10.83 Systolic murmur     AYV-12-KS: R01.1 ICD-9-CM: 511. 2 Vitals BP Pulse Temp Resp Height(growth percentile) Weight(growth percentile) 138/78 (BP 1 Location: Right arm, BP Patient Position: Sitting) 84 97.8 °F (36.6 °C) (Oral) 18 5' (1.524 m) 174 lb (78.9 kg) SpO2 BMI OB Status Smoking Status 97% 33.98 kg/m2 Hysterectomy Never Smoker Vitals History BMI and BSA Data Body Mass Index Body Surface Area 33.98 kg/m 2 1.83 m 2 Preferred Pharmacy Pharmacy Name Phone [facility] #[place]on Cullen, [address] [facility] [phone] Your Updated Medication List  
  
   
This list is accurate as of 8/3/18 11:08 AM.  Always use your most recent med list.  
  
  
  
  
 Blood-Glucose Meter monitoring kit Commonly known as:  Sch[employer]fer [place] Check BG 3x/day, Dx E11.9  
  
 glimepiride 1 mg tablet Commonly known as:  AMARYL Take 1 Tab by mouth Daily (before breakfast). glucose blood VI test strips strip Commonly known as:  ASCENSIA AUTODISC VI, ONE TOUCH ULTRA TEST VI Check BG 3x/day, Dx E11.9 OneTouch Miniultra Lancets Misc Check BG 3x/day, Dx E11.9 OneTouch Miniultra  
  
 metFORMIN  mg tablet Commonly known as:  GLUCOPHAGE XR Take 3 Tabs by mouth daily (with dinner). We Performed the Following AMB POC HEMOGLOBIN A1C [52281 CPT(R)] REFERRAL TO DERMATOLOGY [REF19 Custom] Comments:  
 Or Dr. Ever Wang or Dr. Johnny De La Cruz Follow-up Instructions Return in about 4 months (around 12/3/2018) for DM2 f/u. To-Do List   
 08/03/2018 ECHO:  2D ECHO COMPLETE ADULT (TTE) W OR WO CONTR Referral Information Referral ID Referred By Referred To  
  
 [id number] [facility] [address] Phone: [phone] Fax: [phone] Visits Status Start Date End Date 1 New Request 8/3/18 8/3/19 If your referral has a status of pending review or denied, additional information will be sent to support the outcome of this decision. Patient Instructions Schedule your eye exam, let them know that you are a newly diagnosed diabetic Schedule your Echocardiogram ([phone]) Introducing [facility]! Dear Chava Allison: Thank you for requesting a [employer] account. Our records indicate that you already have an active [employer] account. You can access your account anytime at [email or web address] Did you know that you can access your hospital and ER discharge instructions at any time in [employer]? You can also review all of your test results from your hospital stay or ER visit. Additional Information If you have questions, please visit the Frequently Asked Questions section of the [employer] website at [email or web address] Remember, [employer] is NOT to be used for urgent needs. For medical emergencies, dial 911. Now available from your iPhone and Android! Please provide this summary of care documentation to your next provider. Your primary care clinician is listed as Everrett Jono. If you have any questions after today's visit, please call [phone].

## 2018-08-08 ENCOUNTER — PATIENT MESSAGE (OUTPATIENT)
Dept: INTERNAL MEDICINE CLINIC | Age: 63
End: 2018-08-08

## 2018-08-08 DIAGNOSIS — E11.9 TYPE 2 DIABETES MELLITUS WITHOUT COMPLICATION, WITHOUT LONG-TERM CURRENT USE OF INSULIN (HCC): ICD-10-CM

## 2018-08-08 RX ORDER — LANCETS
EACH MISCELLANEOUS
Qty: 1 EACH | Refills: 11 | Status: SHIPPED | OUTPATIENT
Start: 2018-08-08 | End: 2018-12-07

## 2018-08-08 RX ORDER — GLIMEPIRIDE 1 MG/1
1 TABLET ORAL
Qty: 90 TAB | Refills: 1 | Status: SHIPPED | OUTPATIENT
Start: 2018-08-08 | End: 2018-12-07 | Stop reason: ALTCHOICE

## 2018-08-08 RX ORDER — METFORMIN HYDROCHLORIDE 500 MG/1
1500 TABLET, EXTENDED RELEASE ORAL
Qty: 90 TAB | Refills: 2 | Status: SHIPPED | OUTPATIENT
Start: 2018-08-08 | End: 2018-10-05 | Stop reason: SDUPTHER

## 2018-08-08 NOTE — TELEPHONE ENCOUNTER
From: Belén Doshi  To: Casi Lockwood NP  Sent: 2018 2:08 PM EDT  Subject: Prescription Question    I called [facility] this morning to renew my 2 diabetes prescriptions and learned refills have . Please have them renewed for me, as I want to pick them up this week.      Thanks, Zaki Lora

## 2018-09-06 ENCOUNTER — HOSPITAL ENCOUNTER (OUTPATIENT)
Dept: NON INVASIVE DIAGNOSTICS | Age: 63
Discharge: HOME | End: 2018-09-06
Attending: NURSE PRACTITIONER
Payer: COMMERCIAL

## 2018-09-06 DIAGNOSIS — R01.1 SYSTOLIC MURMUR: ICD-10-CM

## 2018-09-06 PROCEDURE — 93306 TTE W/DOPPLER COMPLETE: CPT

## 2018-09-25 NOTE — TELEPHONE ENCOUNTER
Patient called stating that she needs to have new scripts for test strips and lancets for [facility] true metrix glucose monitor. Her original script was for one touch ultra but she never used it. Please send new scripts to [facility] pharmacy.   Also please update her chart that she uses  The [facility] true metrix meter

## 2018-09-26 NOTE — TELEPHONE ENCOUNTER
No \"Sunmark\" test strips listed in system. Generic chosen and note to pharmacy that patient is requesting [facility] true brand. Pended to provider for review/changes.

## 2018-10-05 ENCOUNTER — OFFICE VISIT (OUTPATIENT)
Dept: INTERNAL MEDICINE CLINIC | Age: 63
End: 2018-10-05

## 2018-10-05 VITALS
SYSTOLIC BLOOD PRESSURE: 124 MMHG | HEIGHT: 60 IN | WEIGHT: 171 LBS | OXYGEN SATURATION: 98 % | DIASTOLIC BLOOD PRESSURE: 70 MMHG | RESPIRATION RATE: 16 BRPM | HEART RATE: 83 BPM | TEMPERATURE: 98.8 F | BODY MASS INDEX: 33.57 KG/M2

## 2018-10-05 DIAGNOSIS — E11.9 TYPE 2 DIABETES MELLITUS WITHOUT COMPLICATION, WITHOUT LONG-TERM CURRENT USE OF INSULIN (HCC): ICD-10-CM

## 2018-10-05 RX ORDER — METFORMIN HYDROCHLORIDE 500 MG/1
1500 TABLET, EXTENDED RELEASE ORAL
Qty: 270 TAB | Refills: 0 | Status: SHIPPED | OUTPATIENT
Start: 2018-10-05 | End: 2019-01-02 | Stop reason: SDUPTHER

## 2018-10-05 NOTE — PROGRESS NOTES
CC:  Diabetes management/education    S/O: Betty Feng is a 61 y.o. female referred by Dr. Dangelo Segura, MUKESH for diabetes management. HPI: Mrs. Thierry Nguyen presents today for a 2 month follow-up diabetes education management appointment. Since her last visit in August, she has continued to make healthier lifestyle changes. Her BP today was 124/70 mmHg and she is down 9 pounds since June. She is excited that she is down 4 dress sizes and stated that her  (who cooks the majority of the food at home) has even lost 30 pounds himself. She is really excited about her progress and feels that this is something that she can maintain for the long-run. She checks her blood sugars every morning and writes down the meals and snacks that she eats throughout the day. She really enjoys keeping track of what she's eating as it holds her accountable. Her lowest blood sugar reading over the past 2 months was 68, but she did not report any signs/symptoms of hypoglycemia. She did not report any complications or concerns but did request that her metformin be changed to a 90-day supply. She also wishes to have refill dates for both of her diabetes medications synced together so that she can pick them up at the same time. A recent issue with getting a prescription for a more cost-friendly blood glucose meter/test strips at [facility] seems to now be resolved. Current Diabetes Regimen:  Metformin 500 mg ER 3 tablets once daily  Glimepiride 1 mg tablet once daily     ROS:   Patient denies hypoglycemic and hyperglycemic signs/symptoms, chest pain, shortness of breath, neuropathy, vision changes, and any foot changes. Self-Monitoring Blood Glucose:  Morning BG readings are steadily heading in the right direction. August low 100s, September/October 90s-80s. Patient aware to contact Angelique Cassidy PharmD if BS drops too low    Diet:  Patient continues to make healthier diet choices and watching her portion sizes.  She seems to have found the right diet for herself and is able to still enjoy the foods that she loves    Exercise:  She reports that she continues to walk as much as she can throughout the day    Data reviewed:  Lab Results   Component Value Date/Time    Hemoglobin A1c (POC) 6.9 08/03/2018 10:44 AM       Diabetes Health Maintenance:  Reviewed during previous appointment    Assessment/Plan:     1. Diabetes:  Patient is at goal with an a1c <7%. She continues to make healthy lifestyle changes and reports no issues with her current medications. Jarred Quinteros PharmD will complete her request for a 90-day supply of metformin and sync her medication refill dates together. Patient verbalized understanding of the information presented and all of the patients questions were answered. AVS was handed to the patient and information reviewed. Patient advised to call Jarred Quinteros PharmD or Dr. Antione Childress NP with any additional questions or concerns. Follow-up: 2 months  Notification of recommendations will be sent to Dr. Antione Childress NP for review.       Thank you,  Benson Zaragoza PharmD

## 2018-10-05 NOTE — PROGRESS NOTES
Chief Complaint   Patient presents with    Advice Only     Reviewed record in preparation for visit and have obtained necessary documentation. Identified pt with two pt identifiers(name and ). Health Maintenance Due   Topic    Shingrix Vaccine Age 49> (1 of 2)   Alexandra Vita Influenza Age 5 to Adult          Chief Complaint   Patient presents with    Advice Only        Wt Readings from Last 3 Encounters:   10/05/18 171 lb (77.6 kg)   18 174 lb (78.9 kg)   18 180 lb (81.6 kg)     Temp Readings from Last 3 Encounters:   10/05/18 98.8 °F (37.1 °C) (Oral)   18 97.8 °F (36.6 °C) (Oral)   18 97.9 °F (36.6 °C) (Oral)     BP Readings from Last 3 Encounters:   10/05/18 124/70   18 138/78   18 136/78     Pulse Readings from Last 3 Encounters:   10/05/18 83   18 84   18 71           Learning Assessment:  :     Learning Assessment 2018   PRIMARY LEARNER Spouse   HIGHEST LEVEL OF EDUCATION - PRIMARY LEARNER  SOME COLLEGE   PRIMARY LANGUAGE ENGLISH   LEARNER PREFERENCE PRIMARY VIDEOS   ANSWERED BY spouse   RELATIONSHIP SPOUSE       Depression Screening:  :     PHQ over the last two weeks 8/3/2018   Little interest or pleasure in doing things Not at all   Feeling down, depressed, irritable, or hopeless Not at all   Total Score PHQ 2 0       Fall Risk Assessment:  :     No flowsheet data found. Abuse Screening:  :     Abuse Screening Questionnaire 2018   Do you ever feel afraid of your partner? N   Are you in a relationship with someone who physically or mentally threatens you? N   Is it safe for you to go home?  Y       Coordination of Care Questionnaire:  :     1) Have you been to an emergency room, urgent care clinic since your last visit? no   Hospitalized since your last visit? no             2) Have you seen or consulted any other health care providers outside of [address] [place] [address] since your last visit? no  (Include any pap smears or colon screenings in this section.)    3) Do you have an Advance Directive on file? no    4) Are you interested in receiving information on Advance Directives? NO      Patient is accompanied by self I have received verbal consent from Miguel Carmen to discuss any/all medical information while they are present in the room. Reviewed record  In preparation for visit and have obtained necessary documentation.

## 2018-10-05 NOTE — PROGRESS NOTES
Patient seen with pharmacy resident, Abundio Gonzalez. I was present for the visit and agree with her assessment and plan. Please see her note for more detail. Patient to follow up in December on the same day are her appt with MUKESH Horton.     Dwayne Merlos, PharmD, BCPS, CDE

## 2018-10-18 ENCOUNTER — OFFICE VISIT (OUTPATIENT)
Dept: INTERNAL MEDICINE CLINIC | Age: 63
End: 2018-10-18

## 2018-10-18 VITALS
SYSTOLIC BLOOD PRESSURE: 118 MMHG | RESPIRATION RATE: 18 BRPM | WEIGHT: 172.2 LBS | DIASTOLIC BLOOD PRESSURE: 80 MMHG | HEART RATE: 76 BPM | HEIGHT: 61 IN | BODY MASS INDEX: 32.51 KG/M2 | TEMPERATURE: 97.6 F | OXYGEN SATURATION: 97 %

## 2018-10-18 DIAGNOSIS — G57.11 MERALGIA PARESTHETICA, RIGHT LOWER LIMB: Primary | ICD-10-CM

## 2018-10-18 NOTE — PROGRESS NOTES
Reviewed record in preparation for visit and have obtained necessary documentation. Identified pt with two pt identifiers(name and ). Health Maintenance Due   Topic    Shingrix Vaccine Age 49> (1 of 2)   [facility] Influenza Age 5 to Adult          Chief Complaint   Patient presents with    Tingling     Right side upper leg x4days        Wt Readings from Last 3 Encounters:   10/18/18 172 lb 3.2 oz (78.1 kg)   10/05/18 171 lb (77.6 kg)   18 174 lb (78.9 kg)     Temp Readings from Last 3 Encounters:   10/05/18 98.8 °F (37.1 °C) (Oral)   18 97.8 °F (36.6 °C) (Oral)   18 97.9 °F (36.6 °C) (Oral)     BP Readings from Last 3 Encounters:   10/05/18 124/70   18 138/78   18 136/78     Pulse Readings from Last 3 Encounters:   10/05/18 83   18 84   18 71           Learning Assessment:  :     Learning Assessment 2018   PRIMARY LEARNER Spouse   HIGHEST LEVEL OF EDUCATION - PRIMARY LEARNER  SOME COLLEGE   PRIMARY LANGUAGE ENGLISH   LEARNER PREFERENCE PRIMARY VIDEOS   ANSWERED BY spouse   RELATIONSHIP SPOUSE       Depression Screening:  :     PHQ over the last two weeks 8/3/2018   Little interest or pleasure in doing things Not at all   Feeling down, depressed, irritable, or hopeless Not at all   Total Score PHQ 2 0       Fall Risk Assessment:  :     No flowsheet data found. Abuse Screening:  :     Abuse Screening Questionnaire 2018   Do you ever feel afraid of your partner? N   Are you in a relationship with someone who physically or mentally threatens you? N   Is it safe for you to go home?  Y       Coordination of Care Questionnaire:  :     1) Have you been to an emergency room, urgent care clinic since your last visit? no   Hospitalized since your last visit? no             2) Have you seen or consulted any other health care providers outside of [address] [place] [address] since your last visit? no  (Include any pap smears or colon screenings in this section.)    3) Do you have an Advance Directive on file? no    4) Are you interested in receiving information on Advance Directives? NO      Patient is accompanied by self I have received verbal consent from Mario Mayes to discuss any/all medical information while they are present in the room.

## 2018-10-18 NOTE — PROGRESS NOTES
62 yo female c/o \"neuralgia\"  In RLE laterally, anteriorly and proximally. This was a feeling of stabbing pain. She took Aleve 440 mg BID for 48 hrs, which usually helps her aches and pains, but which didn't help. So she took some Excedrin which helped to some extent; she took this twice a day from 10/16 till now. She has also has used heat and ice. The discomfort persists, although at a lower level of intensity. The skin of that area is actually sensitive to light touch, but no rash. She was using a blower for several hours on 10/13 blowing acorns out of the yard. She is Diabetic, and AM fasting glucose (tested every day) upper 80s to 110 (on Glimeparide and Metformin). Her A1c in April was 12, and at last check was 6.9 in early August     PE: WNWD WF   BP - 118/60   T - 97.6   R buttock/proximal RLE - no rash or discoloration; no tenderness of gluteal region; mild superficial sensitivity to light touch anterior, proximal leg; R DP pulse = 2+    Imp: Meralgia paresthetica RLE    Plan: (discussed with Dr. Omari Rae)   Info given on the disorder   Cool compresses   Topical OTC Lidocaine prn   Excedrin prn  _________________________  Expected course of current diagnosed problem(s) as well as expected progression and possible complications, and desired follow up with provider are discussed with patient. Patient is encouraged to be back in touch with any questions or concerns. Patient expresses understanding of plan of care. Patient is given AVS which includes diagnoses, current medications, vitals.

## 2018-10-18 NOTE — PATIENT INSTRUCTIONS
Meralgia Paresthetica: Care Instructions  Your Care Instructions  Meralgia paresthetica (say \"muh-RAL-juh nxd-yvr-SIQL-ick-uh\") is pain and numbness in the outer part of your thigh. The pain might get worse after you walk or stand for a long time. This pain and numbness occur when a nerve in your thigh is pinched (compressed). Sometimes the problem is caused by wearing tight clothing or being overweight. Most of the time the problem goes away on its own in a few months. Lowering any pressure on the thigh area may help. Wear loose clothes, and lose weight if you need to. Follow-up care is a key part of your treatment and safety. Be sure to make and go to all appointments, and call your doctor if you are having problems. It's also a good idea to know your test results and keep a list of the medicines you take. How can you care for yourself at home? · Most times the problem gets better on its own. Try wearing loose clothing to see if this helps. · Excedrin or an NSAID as needed  · Over the counter Lidocaine patch or cream (be sure to wash hands after use)  When should you call for help? Watch closely for changes in your health, and be sure to contact your doctor if:    · You have new symptoms, such as pain that gets worse or new numbness in your thigh.     · You do not get better as expected. Where can you learn more? Go to http://sheila-tonia.info/. Enter J151 in the search box to learn more about \"Meralgia Paresthetica: Care Instructions. \"  Current as of: June 4, 2018  Content Version: 11.8  © 2983-5488 [employer]. Care instructions adapted under license by [employer] (which disclaims liability or warranty for this information). If you have questions about a medical condition or this instruction, always ask your healthcare professional. [address] any warranty or liability for your use of this information.

## 2018-12-07 ENCOUNTER — OFFICE VISIT (OUTPATIENT)
Dept: INTERNAL MEDICINE CLINIC | Age: 63
End: 2018-12-07

## 2018-12-07 VITALS
HEIGHT: 61 IN | OXYGEN SATURATION: 98 % | DIASTOLIC BLOOD PRESSURE: 78 MMHG | HEART RATE: 86 BPM | RESPIRATION RATE: 19 BRPM | BODY MASS INDEX: 31.72 KG/M2 | WEIGHT: 168 LBS | TEMPERATURE: 98.1 F | SYSTOLIC BLOOD PRESSURE: 136 MMHG

## 2018-12-07 DIAGNOSIS — E11.9 TYPE 2 DIABETES MELLITUS WITHOUT COMPLICATION, WITHOUT LONG-TERM CURRENT USE OF INSULIN (HCC): Primary | ICD-10-CM

## 2018-12-07 LAB — HBA1C MFR BLD HPLC: 5.9 %

## 2018-12-07 RX ORDER — VALACYCLOVIR HYDROCHLORIDE 1 G/1
TABLET, FILM COATED ORAL
COMMUNITY
Start: 2018-11-02 | End: 2018-12-07

## 2018-12-07 NOTE — PROGRESS NOTES
Subjective: Mario Mayes is a 61 y.o. female who presents today for DM2 follow up    DM2:  Taking metformin 500 mg ER 3 tablets once daily and glimepiride 1 mg tablet once daily   Has lost weight, 11lb since diagnosis earlier this year  Limiting carbs, tracking foods.  doing weight loss with her as well  working with PharmNEIL Nunn for DM2 diet education   Fasting BGs running in the 90s, 1 reading of 68- no hypogylcemic symptoms   No numbness of tingling in extremities  08/2018 HgbA1c 6.9%, today poc hgba1c 5.9%    No longer walking a mile a day, as has been cold outside  Is walking about 4226-7222 steps a day    Had shingles in 10/2018, diagnosed by [facility].  Took valtrex. Has completely resolved    Denies chest pain, palpitaitons or dypsnea       Health maintenance:   Last pap:  [facility], Dr. Reagan Ochoa, [facility] mammogram:  [facility], Dr. Reagan Ochoa, [facility]  Last colonoscopy: [address] 5 years, 11/2014- due again in 2019. Dr. Roxann Mejia, family hist colon cancer  Last tetanus vaccination  refuses, notes allergy  Last pneumonia vaccination: prevnar 13 due 06/2019  Shingles vaccination: refuses bc $$  40  Patient Active Problem List    Diagnosis Date Noted    Vitamin D insufficiency 08/03/2018    Other hyperlipidemia 08/03/2018    Actinic keratosis 06/14/2018    History of colonoscopy 06/12/2018    Diverticulosis 06/12/2018    History of basal cell carcinoma 06/08/2018    Type II diabetes mellitus ([facility] [address] 05/11/2018     Current Outpatient Medications   Medication Sig Dispense Refill    valACYclovir (VALTREX) 1 gram tablet       metFORMIN ER (GLUCOPHAGE XR) 500 mg tablet Take 3 Tabs by mouth daily (with dinner).  270 Tab 0    glucose blood VI test strips (BLOOD GLUCOSE TEST) strip Use to test blood glucose once daily 100 Strip 11    lancets 31 gauge misc Use to test blood glucose once daily 100 Lancet 11    glucose blood VI test strips (ASCENSIA AUTODISC VI, ONE TOUCH ULTRA TEST VI) strip Check BG 3x/day, Dx E11.9 OneTouch Miniultra 100 Strip 5    Blood-Glucose Meter (ONETOUCH ULTRAMINI) monitoring kit Check BG 3x/day, Dx E11.9 1 Kit 0    glimepiride (AMARYL) 1 mg tablet Take 1 Tab by mouth Daily (before breakfast). 90 Tab 1    Lancets misc Check BG 3x/day, Dx E11.9 OneTouch Miniultra 1 Each 11        Review of Systems    Pertinent items are noted in HPI. Objective:     Visit Vitals  Pulse 86   Resp 19   Ht 5' 0.63\" (1.54 m)   Wt 168 lb (76.2 kg)   SpO2 98%   BMI 32.13 kg/m²     General appearance: alert, cooperative, no distress, appears stated age  Head: Normocephalic, without obvious abnormality, atraumatic  Lungs: clear to auscultation bilaterally  Heart: regular rate and rhythm, S1, S2 normal, no murmur, click, rub or gallop  Extremities: extremities normal, atraumatic, no cyanosis or edema  Skin: Skin color, texture, turgor normal. No rashes or lesions  Neurologic: Grossly normal    Assessment/Plan:     1. Type 2 diabetes mellitus without complication, without long-term current use of insulin ([facility] [address]  - great job with diet and weight loss  - cont current meds, consider stopping glimepiride next visit  - cont working with Brennen Nguyen for diet education  - cont tracking food  - increase steps to 10,000 daily as gaol  - AMB POC HEMOGLOBIN A1C      Advised her to call back or return to office if symptoms worsen/change/persist.  Discussed expected course/resolution/complications of diagnosis in detail with patient. Medication risks/benefits/costs/interactions/alternatives discussed with patient. She was given an after visit summary which includes diagnoses, current medications, & vitals. She expressed understanding with the diagnosis and plan.     Venkatesh Bills, RENETTA

## 2018-12-07 NOTE — PROGRESS NOTES
Verified name and birth date for privacy precautions. Chart reviewed in preparation for today's visit. Chief Complaint   Patient presents with    Diabetes     4 month f/u          Health Maintenance Due   Topic    Shingrix Vaccine Age 49> (1 of 2)    Influenza Age 5 to Adult          Wt Readings from Last 3 Encounters:   12/07/18 168 lb (76.2 kg)   10/18/18 172 lb 3.2 oz (78.1 kg)   10/05/18 171 lb (77.6 kg)     Temp Readings from Last 3 Encounters:   12/07/18 98.1 °F (36.7 °C) (Oral)   10/18/18 97.6 °F (36.4 °C) (Oral)   10/05/18 98.8 °F (37.1 °C) (Oral)     BP Readings from Last 3 Encounters:   12/07/18 142/70   10/18/18 118/80   10/05/18 124/70     Pulse Readings from Last 3 Encounters:   12/07/18 86   10/18/18 76   10/05/18 83         Learning Assessment:  :     Learning Assessment 5/11/2018   PRIMARY LEARNER Spouse   HIGHEST LEVEL OF EDUCATION - PRIMARY LEARNER  SOME COLLEGE   PRIMARY LANGUAGE ENGLISH   LEARNER PREFERENCE PRIMARY VIDEOS   ANSWERED BY spouse   RELATIONSHIP SPOUSE       Depression Screening:  :     PHQ over the last two weeks 8/3/2018   Little interest or pleasure in doing things Not at all   Feeling down, depressed, irritable, or hopeless Not at all   Total Score PHQ 2 0       Fall Risk Assessment:  :     No flowsheet data found. Abuse Screening:  :     Abuse Screening Questionnaire 6/8/2018   Do you ever feel afraid of your partner? N   Are you in a relationship with someone who physically or mentally threatens you? N   Is it safe for you to go home? Y       Coordination of Care Questionnaire:  :     1) Have you been to an emergency room, urgent care clinic since your last visit? no   Hospitalized since your last visit? no             2) Have you seen or consulted any other health care providers outside of [address] [place] [address] since your last visit?  yes, [facility] for shingles  (Include any pap smears or colon screenings in this section.)    3) Do you have an Advance Directive on file? no.    ------------------------------------------------

## 2018-12-07 NOTE — PROGRESS NOTES
S/O: Patient seen briefly after her appointment with NP Giovani Anand. A1c has decreased even further and after reviewing patients blood sugars, she is having several fasting values in the 60's/70's. She does feel a little shaky but overall okay. Still journaling all of her food and blood sugars. She is continuing to lose weight and a very healthy rate. A/P: Congratulated patient on the success she has had getting her diabetes under excellent control. Advised to HOLD glimepiride for now as this is likely contributing to low blood sugars and can make it harder to lose weight. Advised patient that if when she comes back in 2 months that sugars seem to have increased too much we can always add it back. Patient verbalized understanding. Follow up in 2 months.     Jasper Latif, PharmD, BCPS, CDE

## 2018-12-07 NOTE — PATIENT INSTRUCTIONS
Noninsulin Medicines for Type 2 Diabetes: Care Instructions  Your Care Instructions    There are different types of noninsulin medicines for diabetes. Each works in a different way. But they all help you control your blood sugar. Some types help your body make insulin to lower your blood sugar. Others lower how much insulin your body needs. Some can slow how fast your body digests sugars. And some can remove extra glucose through your urine. · Alpha-glucosidase inhibitors. These keep starches from breaking down. This means that they lower the amount of glucose absorbed when you eat. They don't help your body make more insulin. So they will not cause low blood sugar unless you use them with other medicines for diabetes. They include acarbose and miglitol. · DPP-4 inhibitors. These help your body raise the level of insulin after you eat. They also help your body make less of a hormone that raises blood sugar. They include linagliptin, saxagliptin, and sitagliptin. · Incretin hormones (GLP-1 receptor agonists). Your body makes a protein that can raise your insulin level. It also can lower your blood sugar and make you less hungry. You can get shots of hormones that work the same way. They include exenatide and liraglutide. · Meglitinides. These help your body release insulin. They also help slow how your body digests sugars. So they can keep your blood sugar from rising too fast after you eat. They include nateglinide and repaglinide. · Metformin. This lowers how much glucose your liver makes. And it helps you respond better to insulin. It also lowers the amount of stored sugar that your liver releases when you are not eating. · SGLT2 inhibitors. These help to remove extra glucose through your urine. They may also help some people lose weight. They include canagliflozin, dapagliflozin, and empagliflozin. · Sulfonylureas. These help your body release more insulin. Some work for many hours.  They can cause low blood sugar if you don't eat as you planned. They include glipizide and glyburide. · Thiazolidinediones. These reduce the amount of blood glucose. They also help you respond better to insulin. They include pioglitazone and rosiglitazone. You may need to take more than one medicine for diabetes. Two or more medicines may work better to lower your blood sugar level than just one does. Follow-up care is a key part of your treatment and safety. Be sure to make and go to all appointments, and call your doctor if you are having problems. It's also a good idea to know your test results and keep a list of the medicines you take. How can you care for yourself at home? · Eat a healthy diet. Get some exercise each day. This may help you to reduce how much medicine you need. · Do not take other prescription or over-the-counter medicines, vitamins, herbal products, or supplements without talking to your doctor first. Some medicines for type 2 diabetes can cause problems with other medicines or supplements. · Tell your doctor if you plan to get pregnant. Some of these drugs are not safe for pregnant women. · Be safe with medicines. Take your medicines exactly as prescribed. Meglitinides and sulfonylureas can cause your blood sugar to drop very low. Call your doctor if you think you are having a problem with your medicine. · Check your blood sugar often. You can use a glucose monitor. Keeping track can help you know how certain foods, activities, and medicines affect your blood sugar. And it can help you keep your blood sugar from getting so low that it's not safe. When should you call for help? Call 911 anytime you think you may need emergency care.  For example, call if:    · You passed out (lost consciousness).     · You are confused or cannot think clearly.     · Your blood sugar is very high or very low.    Watch closely for changes in your health, and be sure to contact your doctor if:    · Your blood sugar stays outside the level your doctor set for you.     · You have any problems. Where can you learn more? Go to http://sheila-tonia.info/. Enter H153 in the search box to learn more about \"Noninsulin Medicines for Type 2 Diabetes: Care Instructions. \"  Current as of: December 7, 2017  Content Version: 11.8  © 5310-8855 [employer] Care instructions adapted under license by [employer] (which disclaims liability or warranty for this information). If you have questions about a medical condition or this instruction, always ask your healthcare professional. [address] any warranty or liability for your use of this information.

## 2019-01-02 DIAGNOSIS — E11.9 TYPE 2 DIABETES MELLITUS WITHOUT COMPLICATION, WITHOUT LONG-TERM CURRENT USE OF INSULIN (HCC): ICD-10-CM

## 2019-01-02 RX ORDER — METFORMIN HYDROCHLORIDE 500 MG/1
1500 TABLET, EXTENDED RELEASE ORAL
Qty: 270 TAB | Refills: 1 | Status: SHIPPED | OUTPATIENT
Start: 2019-01-02 | End: 2019-06-28 | Stop reason: SDUPTHER

## 2019-01-02 NOTE — TELEPHONE ENCOUNTER
PCP: Yudith Gonzalez NP    Last appt: 12/7/2018  Future Appointments   Date Time Provider Lisa Cabrera   2/8/2019  9:00 AM Latisha Shi   4/12/2019  9:00 AM MUKESH Starkey   4/12/2019 10:00 AM Michelle MULLIGAN       Requested Prescriptions     Pending Prescriptions Disp Refills    metFORMIN ER (GLUCOPHAGE XR) 500 mg tablet 270 Tab 0     Sig: Take 3 Tabs by mouth daily (with dinner).

## 2019-02-08 ENCOUNTER — OFFICE VISIT (OUTPATIENT)
Dept: INTERNAL MEDICINE CLINIC | Age: 64
End: 2019-02-08

## 2019-02-08 VITALS
RESPIRATION RATE: 16 BRPM | WEIGHT: 175.2 LBS | BODY MASS INDEX: 33.08 KG/M2 | SYSTOLIC BLOOD PRESSURE: 120 MMHG | HEIGHT: 61 IN | HEART RATE: 84 BPM | DIASTOLIC BLOOD PRESSURE: 74 MMHG | TEMPERATURE: 97.8 F | OXYGEN SATURATION: 96 %

## 2019-02-08 DIAGNOSIS — E11.9 TYPE 2 DIABETES MELLITUS WITHOUT COMPLICATION, WITHOUT LONG-TERM CURRENT USE OF INSULIN (HCC): Primary | ICD-10-CM

## 2019-02-08 NOTE — PROGRESS NOTES
Chief Complaint   Patient presents with    Advice Only     Reviewed record in preparation for visit and have obtained necessary documentation. Identified pt with two pt identifiers(name and ). There are no preventive care reminders to display for this patient. Chief Complaint   Patient presents with    Advice Only        Wt Readings from Last 3 Encounters:   19 175 lb 3.2 oz (79.5 kg)   18 168 lb (76.2 kg)   10/18/18 172 lb 3.2 oz (78.1 kg)     Temp Readings from Last 3 Encounters:   19 97.8 °F (36.6 °C) (Oral)   18 98.1 °F (36.7 °C) (Oral)   10/18/18 97.6 °F (36.4 °C) (Oral)     BP Readings from Last 3 Encounters:   19 120/74   18 136/78   10/18/18 118/80     Pulse Readings from Last 3 Encounters:   19 84   18 86   10/18/18 76           Learning Assessment:  :     Learning Assessment 2018   PRIMARY LEARNER Spouse   HIGHEST LEVEL OF EDUCATION - PRIMARY LEARNER  SOME COLLEGE   PRIMARY LANGUAGE ENGLISH   LEARNER PREFERENCE PRIMARY VIDEOS   ANSWERED BY spouse   RELATIONSHIP SPOUSE       Depression Screening:  :     PHQ over the last two weeks 8/3/2018   Little interest or pleasure in doing things Not at all   Feeling down, depressed, irritable, or hopeless Not at all   Total Score PHQ 2 0       Fall Risk Assessment:  :     No flowsheet data found. Abuse Screening:  :     Abuse Screening Questionnaire 2018   Do you ever feel afraid of your partner? N   Are you in a relationship with someone who physically or mentally threatens you? N   Is it safe for you to go home?  Y       Coordination of Care Questionnaire:  :     1) Have you been to an emergency room, urgent care clinic since your last visit? no   Hospitalized since your last visit? no             2) Have you seen or consulted any other health care providers outside of [address] [place] [address] since your last visit? no  (Include any pap smears or colon screenings in this section.)    3) Do you have an Advance Directive on file? no    4) Are you interested in receiving information on Advance Directives? NO      Patient is accompanied by self I have received verbal consent from Danyell Ratliff to discuss any/all medical information while they are present in the room. Reviewed record  In preparation for visit and have obtained necessary documentation.

## 2019-02-08 NOTE — PROGRESS NOTES
CC:  Diabetes management/education    S/O: Aubrie Beasley is a 61 y.o. female referred by Dr. Minda Estrada NP for diabetes management. HPI: Patient here for 2 month follow up. She sees me every 2 months and NP Baljinder Will every 4 months. At her last visit we stopped the glimepiride to prevent hypoglycemia. Current Diabetes Regimen:  Metformin ER 500mg 3 tabs with dinner    ROS:   Patient denies hypoglycemic and hyperglycemic signs/symptoms, chest pain, shortness of breath, neuropathy, vision changes, and any foot changes. Self-Monitoring Blood Glucose:  Patient checking fasting every day - ranging from 90's to 120's  Has checked twice 2 hours after eating and both in the 150/160 range    Diet:  Still journaling al of her food and reading food labels  Not being crazy strict but being sure to get in lots of protein and vegetables    Exercise:  Has been trying to get 10,000 steps but not succeeding  She is walking most days if the weather is good but still only hitting 7k to 9k  Advised her to see where she is and then increase from there vs only thinking about a goal of 10k  Patient plans to try to walk more with the weather improving        Data reviewed:  Lab Results   Component Value Date/Time    Hemoglobin A1c (POC) 5.9 12/07/2018 10:30 AM         Diabetes Health Maintenance:  Last eye exam: 8/14/18  Last foot exam:6/8/18  Last influenza vaccine: 10/25/18  Last Pneumovax 23 vaccine: 6/8/2018 - will need to repeat 6/2023  Last Prevnar-13 vaccine: due when 65  Hepatitis B Series: unknown  ASA Therapy: none  ACE/ARB Therapy: none  Statin Therapy: none    Assessment/Plan:     1. Diabetes:  a1c at goal <7% and blood sugars in goal range of . Patient has not had any hypoglycemic episodes since stopping glimepiride and states she even notices feeling better. Encouraged her to keep up the food journaling and exercise and try to get more walks in as the weather improves to help with weight loss. Patient could benefit from statin therapy - may consider in the future. Patient to touch base in 2 months. Patient verbalized understanding of the information presented and all of the patients questions were answered. AVS was handed to the patient and information reviewed. Patient advised to call me or Dr. Sunny Metcalf NP with any additional questions or concerns. Follow-up: 2 months   Notification of recommendations will be sent to Dr. Sunny Metcalf NP for review.       John Paul Carlos, PharmD, BCPS, CDE

## 2019-04-12 ENCOUNTER — OFFICE VISIT (OUTPATIENT)
Dept: INTERNAL MEDICINE CLINIC | Age: 64
End: 2019-04-12

## 2019-04-12 VITALS
TEMPERATURE: 98.4 F | DIASTOLIC BLOOD PRESSURE: 66 MMHG | HEART RATE: 84 BPM | WEIGHT: 179 LBS | OXYGEN SATURATION: 98 % | RESPIRATION RATE: 16 BRPM | SYSTOLIC BLOOD PRESSURE: 130 MMHG | BODY MASS INDEX: 33.79 KG/M2 | HEIGHT: 61 IN

## 2019-04-12 DIAGNOSIS — E11.9 TYPE 2 DIABETES MELLITUS WITHOUT COMPLICATION, WITHOUT LONG-TERM CURRENT USE OF INSULIN (HCC): Primary | ICD-10-CM

## 2019-04-12 NOTE — PROGRESS NOTES
CC:  Diabetes management/education    S/O: Terrell Andujar is a 59 y.o. female referred by Dr. Rhea Frank NP for diabetes management. HPI: Patient here for her 2 month follow up. She was also supposed to have an appointment with MUKESH Winchester today but she messed up the time of her appointments. She is looking forward to fresh produce with spring/summer season and to getting more walks in. Just went camping with her 2 grandkids last week where they did a lot of walking and bike riding    Current Diabetes Regimen:  Metformin ER 500mg 3 tabs daily    ROS:   Patient denies hypoglycemic and hyperglycemic signs/symptoms, chest pain, shortness of breath, neuropathy, vision changes, and any foot changes. Self-Monitoring Blood Glucose:  131, 114, 108, 109, 125, 137, 98, 106, 92, 109, 113, 111, 111, 99, 114, 98, 93, 78, 94, 114, 100, 121, 125, 104, 103, 109    Diet:  Still logging all of her food and counting portions  Feels like she is in a holding pattern with her weight loss  Has been eating out more with her  for dinner invitations but trying to make better decisions    Exercise:  Loves getting in her walks and hopes to get in more as the weather improved. Talked about options to get more walks in even with better weather. Data reviewed:  Lab Results   Component Value Date/Time    Hemoglobin A1c (POC) 5.9 12/07/2018 10:30 AM         Diabetes Health Maintenance:  Last eye exam: 8/14/18  Last foot exam:6/8/18  Last influenza vaccine: 10/25/18  Last Pneumovax 23 vaccine: 6/8/2018 - will need to repeat 6/2023  Last Prevnar-13 vaccine: due when 65  Hepatitis B Series: unknown  ASA Therapy: none  ACE/ARB Therapy: none  Statin Therapy: none    Assessment/Plan:     1. Diabetes:  a1c at goal <7% and fasting blood sugars in goal range of . Encouraged patient to continue with her dietary changes and weightloss efforts.  Patient to follow up with me and MUKESH Winchester in June - will have her yearly physical with NP at that time. Patient verbalized understanding of the information presented and all of the patients questions were answered. AVS was handed to the patient and information reviewed. Patient advised to call me or Dr. Penny Marlow NP with any additional questions or concerns. Follow-up: 2 months   Notification of recommendations will be sent to Dr. Penny Marlow NP for review.       Alessio Kumar, RamonD, BCPS, CDE

## 2019-05-17 ENCOUNTER — HOSPITAL ENCOUNTER (OUTPATIENT)
Dept: GENERAL RADIOLOGY | Age: 64
Discharge: HOME | End: 2019-05-17
Attending: NURSE PRACTITIONER
Payer: COMMERCIAL

## 2019-05-17 ENCOUNTER — TELEPHONE (OUTPATIENT)
Dept: INTERNAL MEDICINE CLINIC | Age: 64
End: 2019-05-17

## 2019-05-17 ENCOUNTER — OFFICE VISIT (OUTPATIENT)
Dept: INTERNAL MEDICINE CLINIC | Age: 64
End: 2019-05-17

## 2019-05-17 VITALS
DIASTOLIC BLOOD PRESSURE: 80 MMHG | TEMPERATURE: 99.1 F | HEIGHT: 61 IN | BODY MASS INDEX: 33.79 KG/M2 | OXYGEN SATURATION: 98 % | SYSTOLIC BLOOD PRESSURE: 130 MMHG | RESPIRATION RATE: 16 BRPM | WEIGHT: 179 LBS | HEART RATE: 82 BPM

## 2019-05-17 DIAGNOSIS — M25.562 ACUTE PAIN OF LEFT KNEE: ICD-10-CM

## 2019-05-17 DIAGNOSIS — M25.562 ACUTE PAIN OF LEFT KNEE: Primary | ICD-10-CM

## 2019-05-17 PROCEDURE — 73562 X-RAY EXAM OF KNEE 3: CPT

## 2019-05-17 NOTE — PROGRESS NOTES
Subjective: Merrill Pride is a 59 y.o. 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 No falls. Wondering if has tendonitis  Taking Aleve 1 pill once a day without notable improvement  Stiffness and limited ROM when getting up to start moving around in the morning    Patient Active Problem List    Diagnosis Date Noted    Vitamin D insufficiency 08/03/2018    Other hyperlipidemia 08/03/2018    Actinic keratosis 06/14/2018    History of colonoscopy 06/12/2018    Diverticulosis 06/12/2018    History of basal cell carcinoma 06/08/2018    Type II diabetes mellitus ([facility] [address] 05/11/2018     Current Outpatient Medications   Medication Sig Dispense Refill    metFORMIN ER (GLUCOPHAGE XR) 500 mg tablet Take 3 Tabs by mouth daily (with dinner). 270 Tab 1    glucose blood VI test strips (BLOOD GLUCOSE TEST) strip Use to test blood glucose once daily 100 Strip 11    lancets 31 gauge misc Use to test blood glucose once daily 100 Lancet 11    glucose blood VI test strips (ASCENSIA AUTODISC VI, ONE TOUCH ULTRA TEST VI) strip Check BG 3x/day, Dx E11.9 OneTouch Miniultra 100 Strip 5    Blood-Glucose Meter (ONETOUCH ULTRAMINI) monitoring kit Check BG 3x/day, Dx E11.9 1 Kit 0        Review of Systems    Pertinent items are noted in HPI.      Objective:     Visit Vitals  /80 (BP 1 Location: Left arm, BP Patient Position: Sitting)   Pulse 82   Temp 99.1 °F (37.3 °C) (Oral)   Resp 16   Ht 5' 0.63\" (1.54 m)   Wt 179 lb (81.2 kg)   SpO2 98%   BMI 34.24 kg/m²     General appearance: alert, cooperative, no distress, appears stated age, overweight  Head: Normocephalic, without obvious abnormality, atraumatic  Extremities: mild generalized swelling left lateral knee, slow sit to stand, stiff initial movement of left LE, then steady gait, + crepitus bilateral knees  Pulses: 2+ and symmetric pedal pulses  Neurologic: Grossly normal  Psych: calm, cooperative, appropriate affect      Assessment/Plan:     1. Acute pain of left knee  - Aleve 2 tabs BID x 7 days  - weight loss  - XR KNEE LT MIN 4 V; Future  - REFERRAL TO PHYSICAL THERAPY  - REFERRAL TO ORTHOPEDICS    Advised her to call back or return to office if symptoms worsen/change/persist.  Discussed expected course/resolution/complications of diagnosis in detail with patient. Medication risks/benefits/costs/interactions/alternatives discussed with patient. She was given an after visit summary which includes diagnoses, current medications, & vitals. She expressed understanding with the diagnosis and plan.     Solo Perez, RINKUP

## 2019-05-17 NOTE — PROGRESS NOTES
Chief Complaint   Patient presents with    Knee Pain     left     Reviewed record in preparation for visit and have obtained necessary documentation. Identified pt with two pt identifiers(name and ). Health Maintenance Due   Topic    HEMOGLOBIN A1C Q6M     FOOT EXAM Q1          Chief Complaint   Patient presents with    Knee Pain     left        Wt Readings from Last 3 Encounters:   19 179 lb (81.2 kg)   19 179 lb (81.2 kg)   19 175 lb 3.2 oz (79.5 kg)     Temp Readings from Last 3 Encounters:   19 99.1 °F (37.3 °C) (Oral)   19 98.4 °F (36.9 °C) (Oral)   19 97.8 °F (36.6 °C) (Oral)     BP Readings from Last 3 Encounters:   19 130/80   19 130/66   19 120/74     Pulse Readings from Last 3 Encounters:   19 82   19 84   19 84           Learning Assessment:  :     Learning Assessment 2018   PRIMARY LEARNER Spouse   HIGHEST LEVEL OF EDUCATION - PRIMARY LEARNER  SOME COLLEGE   PRIMARY LANGUAGE ENGLISH   LEARNER PREFERENCE PRIMARY VIDEOS   ANSWERED BY spouse   RELATIONSHIP SPOUSE       Depression Screening:  :     3 most recent PHQ Screens 2019   Little interest or pleasure in doing things Not at all   Feeling down, depressed, irritable, or hopeless Not at all   Total Score PHQ 2 0       Fall Risk Assessment:  :     No flowsheet data found. Abuse Screening:  :     Abuse Screening Questionnaire 2018   Do you ever feel afraid of your partner? N   Are you in a relationship with someone who physically or mentally threatens you? N   Is it safe for you to go home?  Y       Coordination of Care Questionnaire:  :     1) Have you been to an emergency room, urgent care clinic since your last visit? no   Hospitalized since your last visit? no             2) Have you seen or consulted any other health care providers outside of [address] [place] [address] since your last visit? no  (Include any pap smears or colon screenings in this section.)    3) Do you have an Advance Directive on file? no    4) Are you interested in receiving information on Advance Directives? NO      Patient is accompanied by self I have received verbal consent from Parker Bashir to discuss any/all medical information while they are present in the room. Reviewed record  In preparation for visit and have obtained necessary documentation.

## 2019-05-17 NOTE — TELEPHONE ENCOUNTER
XRay showing arthritis. Will continue plan discussed with patient in clinic earlier today    Called patient.   Reviewed results with patient    Will see 6/7 for KAILEY Rodriguez NP

## 2019-06-07 ENCOUNTER — OFFICE VISIT (OUTPATIENT)
Dept: INTERNAL MEDICINE CLINIC | Age: 64
End: 2019-06-07

## 2019-06-07 VITALS
BODY MASS INDEX: 34.48 KG/M2 | RESPIRATION RATE: 16 BRPM | HEART RATE: 74 BPM | DIASTOLIC BLOOD PRESSURE: 76 MMHG | WEIGHT: 182.6 LBS | HEIGHT: 61 IN | TEMPERATURE: 98.6 F | OXYGEN SATURATION: 98 % | SYSTOLIC BLOOD PRESSURE: 140 MMHG

## 2019-06-07 DIAGNOSIS — E78.49 OTHER HYPERLIPIDEMIA: ICD-10-CM

## 2019-06-07 DIAGNOSIS — G47.00 INSOMNIA, UNSPECIFIED TYPE: ICD-10-CM

## 2019-06-07 DIAGNOSIS — E11.9 TYPE 2 DIABETES MELLITUS WITHOUT COMPLICATION, WITHOUT LONG-TERM CURRENT USE OF INSULIN (HCC): Primary | ICD-10-CM

## 2019-06-07 RX ORDER — INSULIN PUMP SYRINGE, 3 ML
EACH MISCELLANEOUS
COMMUNITY
End: 2020-05-18

## 2019-06-07 NOTE — PROGRESS NOTES
Subjective: Kentrell Chin is a 59 y.o. female who presents today for f/u DM2    Last seen 5/17 for left knee pain  Completed aleve bid and iced  No PT or ortho  Doing better, better mobility    DM2:  Takes metformin 500 mg ER 3 tablets daily  No longer taking glimepiride   Has greatly improved her diet, limiting carbs. Is eating a good amount of sodium  Last hgba1c 5.9%    Insomnia:  Getting in bed 10-11pm  Occasionally waking up in the middle of the night and then will have trouble falling back asleep  Usually watching tv, likes [employer]  Slept better on the day that she did a lot of yard work    Has been walking for exercise, just under 4000K daily. No chest pain, palpitations, dyspnea, headaches or dizziness         Health maintenance:   Last pap:  [facility], Dr. Toshia Montes, [facility] mammogram:  [facility], Dr. Toshia Montes, [facility]  Last colonoscopy:   Every 5 years, 11/2014- due a 11/2019.  Dr. Rubio, family hist colon cancer    Patient Active Problem List    Diagnosis Date Noted    Vitamin D insufficiency 08/03/2018    Other hyperlipidemia 08/03/2018    Actinic keratosis 06/14/2018    History of colonoscopy 06/12/2018    Diverticulosis 06/12/2018    History of basal cell carcinoma 06/08/2018    Type II diabetes [facility] ([facility] 05/11/2018     Current Outpatient Medications   Medication Sig Dispense Refill    Blood-Glucose Meter monitoring kit by Does Not Apply route. Indications: [employer] monitor      glucose blood VI test strips (BLOOD GLUCOSE TEST) strip by Does Not Apply route See Admin Instructions. Indications: truemetrix test strips      metFORMIN ER (GLUCOPHAGE XR) 500 mg tablet Take 3 Tabs by mouth daily (with dinner).  270 Tab 1    lancets 31 gauge misc Use to test blood glucose once daily (Patient taking differently: Use to test blood glucose once daily  Indications: one touch ultra soft lancets) 100 Lancet 11        Review of Systems    Pertinent items are noted in HPI. Objective:     Visit Vitals  /76 (BP 1 Location: Left arm, BP Patient Position: Sitting)   Pulse 74   Temp 98.6 °F (37 °C) (Oral)   Resp 16   Ht 5' 0.63\" (1.54 m)   Wt 182 lb 9.6 oz (82.8 kg)   SpO2 98%   BMI 34.92 kg/m²     General appearance: alert, cooperative, no distress, appears stated age, overweight  Head: Normocephalic, without obvious abnormality, atraumatic  Lungs: clear to auscultation bilaterally  Heart: regular rate and rhythm  Extremities: extremities normal, atraumatic, no cyanosis or edema, steady gait  Neurologic: Grossly normal  Psych: calm, cooperative, appropriate affect      Assessment/Plan:     1. Type 2 diabetes mellitus without complication, without long-term current use of insulin (HCC)  - continue metformin  - cont diet changes  - encouraged increase in activity level  - may benefit from starting acei at next visit if BP borderline- reviewed limiting sodium with patient after reviewing her diet intake  - LIPID PANEL  - HEMOGLOBIN A1C WITH EAG  - METABOLIC PANEL, COMPREHENSIVE  - CBC WITH AUTOMATED DIFF    2. Other hyperlipidemia  - not on statin  - LIPID PANEL  - METABOLIC PANEL, COMPREHENSIVE    3. Insomnia, unspecified type  - reviewed sleep hygiene with patient  - needs to increase exercise/activity level      Advised her to call back or return to office if symptoms worsen/change/persist.  Discussed expected course/resolution/complications of diagnosis in detail with patient. Medication risks/benefits/costs/interactions/alternatives discussed with patient. She was given an after visit summary which includes diagnoses, current medications, & vitals. She expressed understanding with the diagnosis and plan.     RENETTA Gambino

## 2019-06-07 NOTE — PATIENT INSTRUCTIONS
Insomnia: Care Instructions  Your Care Instructions    Insomnia is the inability to sleep well. It is a common problem for most people at some time. Insomnia may make it hard for you to get to sleep, stay asleep, or sleep as long as you need to. This can make you tired and grouchy during the day. It can also make you forgetful, less effective at work, and unhappy. Insomnia can be caused by conditions such as depression or anxiety. Pain can also affect your ability to sleep. When these problems are solved, the insomnia usually clears up. But sometimes bad sleep habits can cause insomnia. If insomnia is affecting your work or your enjoyment of life, you can take steps to improve your sleep. Follow-up care is a key part of your treatment and safety. Be sure to make and go to all appointments, and call your doctor if you are having problems. It's also a good idea to know your test results and keep a list of the medicines you take. How can you care for yourself at home? What to avoid  · Do not have drinks with caffeine, such as coffee or black tea, for 8 hours before bed. · Do not smoke or use other types of tobacco near bedtime. Nicotine is a stimulant and can keep you awake. · Avoid drinking alcohol late in the evening, because it can cause you to wake in the middle of the night. · Do not eat a big meal close to bedtime. If you are hungry, eat a light snack. · Do not drink a lot of water close to bedtime, because the need to urinate may wake you up during the night. · Do not read or watch TV in bed. Use the bed only for sleeping and sexual activity. What to try  · Go to bed at the same time every night, and wake up at the same time every morning. Do not take naps during the day. · Keep your bedroom quiet, dark, and cool. · Sleep on a comfortable pillow and mattress. · If watching the clock makes you anxious, turn it facing away from you so you cannot see the time.   · If you worry when you lie down, start a worry book. Well before bedtime, write down your worries, and then set the book and your concerns aside. · Try meditation or other relaxation techniques before you go to bed. · If you cannot fall asleep, get up and go to another room until you feel sleepy. Do something relaxing. Repeat your bedtime routine before you go to bed again. · Make your house quiet and calm about an hour before bedtime. Turn down the lights, turn off the TV, log off the computer, and turn down the volume on music. This can help you relax after a busy day. When should you call for help? Watch closely for changes in your health, and be sure to contact your doctor if:    · Your efforts to improve your sleep do not work.     · Your insomnia gets worse.     · You have been feeling down, depressed, or hopeless or have lost interest in things that you usually enjoy. Where can you learn more? Go to http://sheilaMy Perfect Gigtonia.info/. Enter P513 in the search box to learn more about \"Insomnia: Care Instructions. \"  Current as of: June 28, 2018  Content Version: 11.9  © 4388-6232 [employer]. Care instructions adapted under license by [employer] (which disclaims liability or warranty for this information). If you have questions about a medical condition or this instruction, always ask your healthcare professional. [address] any warranty or liability for your use of this information. Learning About Sleeping Well  What does sleeping well mean? Sleeping well means getting enough sleep. How much sleep is enough varies among people. The number of hours you sleep is not as important as how you feel when you wake up. If you do not feel refreshed, you probably need more sleep. Another sign of not getting enough sleep is feeling tired during the day. The average total nightly sleep time is 7½ to 8 hours.  Healthy adults may need a little more or a little less than this.  Why is getting enough sleep important? Getting enough quality sleep is a basic part of good health. When your sleep suffers, your mood and your thoughts can suffer too. You may find yourself feeling more grumpy or stressed. Not getting enough sleep also can lead to serious problems, including injury, accidents, anxiety, and depression. What might cause poor sleeping? Many things can cause sleep problems, including:  · Stress. Stress can be caused by fear about a single event, such as giving a speech. Or you may have ongoing stress, such as worry about work or school. · Depression, anxiety, and other mental or emotional conditions. · Changes in your sleep habits or surroundings. This includes changes that happen where you sleep, such as noise, light, or sleeping in a different bed. It also includes changes in your sleep pattern, such as having jet lag or working a late shift. · Health problems, such as pain, breathing problems, and restless legs syndrome. · Lack of regular exercise. How can you help yourself? Here are some tips that may help you sleep more soundly and wake up feeling more refreshed. Your sleeping area  · Use your bedroom only for sleeping and sex. A bit of light reading may help you fall asleep. But if it doesn't, do your reading elsewhere in the house. Don't watch TV in bed. · Be sure your bed is big enough to stretch out comfortably, especially if you have a sleep partner. · Keep your bedroom quiet, dark, and cool. Use curtains, blinds, or a sleep mask to block out light. To block out noise, use earplugs, soothing music, or a \"white noise\" machine. Your evening and bedtime routine  · Create a relaxing bedtime routine. You might want to take a warm shower or bath, listen to soothing music, or drink a cup of noncaffeinated tea. · Go to bed at the same time every night. And get up at the same time every morning, even if you feel tired.   What to avoid  · Limit caffeine (coffee, tea, caffeinated sodas) during the day, and don't have any for at least 4 to 6 hours before bedtime. · Don't drink alcohol before bedtime. Alcohol can cause you to wake up more often during the night. · Don't smoke or use tobacco, especially in the evening. Nicotine can keep you awake. · Don't take naps during the day, especially close to bedtime. · Don't lie in bed awake for too long. If you can't fall asleep, or if you wake up in the middle of the night and can't get back to sleep within 15 minutes or so, get out of bed and go to another room until you feel sleepy. · Don't take medicine right before bed that may keep you awake or make you feel hyper or energized. Your doctor can tell you if your medicine may do this and if you can take it earlier in the day. If you can't sleep  · Imagine yourself in a peaceful, pleasant scene. Focus on the details and feelings of being in a place that is relaxing. · Get up and do a quiet or boring activity until you feel sleepy. · Don't drink any liquids after 6 p.m. if you wake up often because you have to go to the bathroom. Where can you learn more? Go to http://sheila-tonia.info/. Enter Q449 in the search box to learn more about \"Learning About Sleeping Well. \"  Current as of: September 11, 2018  Content Version: 11.9  © 7507-4520 [employer], [facility]. Care instructions adapted under license by "[employer] (which disclaims liability or warranty for this information). If you have questions about a medical condition or this instruction, always ask your healthcare professional. [place] [address] any warranty or liability for your use of this information.

## 2019-06-08 LAB
ALBUMIN SERPL-MCNC: 4.2 G/DL (ref 3.6–4.8)
ALBUMIN/GLOB SERPL: 1.7 {RATIO} (ref 1.2–2.2)
ALP SERPL-CCNC: 76 IU/L (ref 39–117)
ALT SERPL-CCNC: 6 IU/L (ref 0–32)
AST SERPL-CCNC: 10 IU/L (ref 0–40)
BASOPHILS # BLD AUTO: 0.1 X10E3/UL (ref 0–0.2)
BASOPHILS NFR BLD AUTO: 1 %
BILIRUB SERPL-MCNC: 0.3 MG/DL (ref 0–1.2)
BUN SERPL-MCNC: 15 MG/DL (ref 8–27)
BUN/CREAT SERPL: 23 (ref 12–28)
CALCIUM SERPL-MCNC: 10.2 MG/DL (ref 8.7–10.3)
CHLORIDE SERPL-SCNC: 104 MMOL/L (ref 96–106)
CHOLEST SERPL-MCNC: 210 MG/DL (ref 100–199)
CO2 SERPL-SCNC: 25 MMOL/L (ref 20–29)
CREAT SERPL-MCNC: 0.65 MG/DL (ref 0.57–1)
EOSINOPHIL # BLD AUTO: 0.2 X10E3/UL (ref 0–0.4)
EOSINOPHIL NFR BLD AUTO: 2 %
ERYTHROCYTE [DISTWIDTH] IN BLOOD BY AUTOMATED COUNT: 13.5 % (ref 12.3–15.4)
EST. AVERAGE GLUCOSE BLD GHB EST-MCNC: 137 MG/DL
GLOBULIN SER CALC-MCNC: 2.5 G/DL (ref 1.5–4.5)
GLUCOSE SERPL-MCNC: 134 MG/DL (ref 65–99)
HBA1C MFR BLD: 6.4 % (ref 4.8–5.6)
HCT VFR BLD AUTO: 39.5 % (ref 34–46.6)
HDLC SERPL-MCNC: 64 MG/DL
HGB BLD-MCNC: 13.1 G/DL (ref 11.1–15.9)
IMM GRANULOCYTES # BLD AUTO: 0 X10E3/UL (ref 0–0.1)
IMM GRANULOCYTES NFR BLD AUTO: 0 %
INTERPRETATION, 910389: NORMAL
LDLC SERPL CALC-MCNC: 132 MG/DL (ref 0–99)
LYMPHOCYTES # BLD AUTO: 2.1 X10E3/UL (ref 0.7–3.1)
LYMPHOCYTES NFR BLD AUTO: 27 %
Lab: NORMAL
MCH RBC QN AUTO: 31 PG (ref 26.6–33)
MCHC RBC AUTO-ENTMCNC: 33.2 G/DL (ref 31.5–35.7)
MCV RBC AUTO: 93 FL (ref 79–97)
MONOCYTES # BLD AUTO: 0.4 X10E3/UL (ref 0.1–0.9)
MONOCYTES NFR BLD AUTO: 6 %
NEUTROPHILS # BLD AUTO: 4.9 X10E3/UL (ref 1.4–7)
NEUTROPHILS NFR BLD AUTO: 64 %
PLATELET # BLD AUTO: 268 X10E3/UL (ref 150–450)
POTASSIUM SERPL-SCNC: 4.6 MMOL/L (ref 3.5–5.2)
PROT SERPL-MCNC: 6.7 G/DL (ref 6–8.5)
RBC # BLD AUTO: 4.23 X10E6/UL (ref 3.77–5.28)
SODIUM SERPL-SCNC: 143 MMOL/L (ref 134–144)
TRIGL SERPL-MCNC: 69 MG/DL (ref 0–149)
VLDLC SERPL CALC-MCNC: 14 MG/DL (ref 5–40)
WBC # BLD AUTO: 7.7 X10E3/UL (ref 3.4–10.8)

## 2019-06-13 ENCOUNTER — TELEPHONE (OUTPATIENT)
Dept: INTERNAL MEDICINE CLINIC | Age: 64
End: 2019-06-13

## 2019-06-13 NOTE — TELEPHONE ENCOUNTER
Patient would like Emre Payton to call her back to discuss arthritis in her left knee. Please Have deion call her back at [phone].

## 2019-06-13 NOTE — TELEPHONE ENCOUNTER
Spoke with patient. Verified name and . Patient states that she is still having arthritic pain. She states that she has not been taking Aleve anymore and would like to know what she can take for pain. Consulted with PCP. Patient can take tylenol arthritis TID as needed, along with other NSAID of choice such as ibuprofen or aleve. Patient also informed that she was advised to set up appointment with physical therapy, which she states she is going out of town for 3 weeks and she does not want to schedule any appointments and have a break in therapy. Advised patient she could possibly start therapy and have exercises she can work on until next appointment. Patient declined that option. Patient verbalized an understanding of medications PCP recommends.

## 2019-06-28 DIAGNOSIS — E11.9 TYPE 2 DIABETES MELLITUS WITHOUT COMPLICATION, WITHOUT LONG-TERM CURRENT USE OF INSULIN (HCC): ICD-10-CM

## 2019-06-28 RX ORDER — METFORMIN HYDROCHLORIDE 500 MG/1
1500 TABLET, EXTENDED RELEASE ORAL
Qty: 270 TAB | Refills: 1 | Status: SHIPPED | OUTPATIENT
Start: 2019-06-28 | End: 2019-10-21 | Stop reason: SDUPTHER

## 2019-06-28 NOTE — TELEPHONE ENCOUNTER
Requested Prescriptions     Pending Prescriptions Disp Refills    metFORMIN ER (GLUCOPHAGE XR) 500 mg tablet 270 Tab 1     Sig: Take 3 Tabs by mouth daily (with dinner).      06/07/19 08/02/19      [id number] - [place] Hernandez, [address] [place] [address]

## 2019-06-28 NOTE — TELEPHONE ENCOUNTER
Last refill- 1/2/19  Last office visit - 6/7/2019  Next office visit -   Future Appointments   Date Time Provider Lisa Cabrera   8/2/2019  9:00 AM Latisha Shi   10/18/2019  9:00 AM MUKESH Pennington   10/18/2019 10:00 AM Governor Tanesha MULLIGAN         Requested Prescriptions     Pending Prescriptions Disp Refills    metFORMIN ER (GLUCOPHAGE XR) 500 mg tablet 270 Tab 1     Sig: Take 3 Tabs by mouth daily (with dinner).

## 2019-08-02 ENCOUNTER — OFFICE VISIT (OUTPATIENT)
Dept: INTERNAL MEDICINE CLINIC | Age: 64
End: 2019-08-02

## 2019-08-02 VITALS
HEART RATE: 74 BPM | DIASTOLIC BLOOD PRESSURE: 76 MMHG | OXYGEN SATURATION: 99 % | BODY MASS INDEX: 34.17 KG/M2 | SYSTOLIC BLOOD PRESSURE: 122 MMHG | WEIGHT: 181 LBS | HEIGHT: 61 IN | TEMPERATURE: 98.2 F | RESPIRATION RATE: 18 BRPM

## 2019-08-02 DIAGNOSIS — E11.9 TYPE 2 DIABETES MELLITUS WITHOUT COMPLICATION, WITHOUT LONG-TERM CURRENT USE OF INSULIN (HCC): Primary | ICD-10-CM

## 2019-08-02 NOTE — PROGRESS NOTES
CC:  Diabetes management/education    S/O: Phuong Reis is a 59 y.o. female referred by Dr. Belem Valle, MUKESH for diabetes management. HPI: Patient here for ~4 month follow up. Last seen in April where things were going well. She's been tracking her food intake daily and tries hard to stay on track. Even when on vacation, she made sure to have all of her regular foods with her. She has not been able to do much walking because of knee pain (osteoarthritis) but she's hoping to get back into this soon. Current Diabetes Regimen:  Metformin ER 500mg - 3 tabs dailiy  -was on glimepiride too but was having lows so d/c 12/2018. -Tolerated fine  -denies any missed doses    ROS:   Patient denies hypoglycemic and hyperglycemic signs/symptoms, chest pain, shortness of breath, neuropathy, vision changes, and any foot changes. Self-Monitoring Blood Glucose:  129, 113, 117, 128, 140, 120, 129, 122, 132, 123, 104, 110, 111, 90, 108, 122, 140, 125, 121, 127, 124, 121, 117, 110, 124, 112    Diet:  Tracks all of her food and brought logs  Tries to watch quantities and adhere to plate method    Exercise:  Limited lately because of knee pain - osteoarthritis  Taking tylenol now which is helping a lot    Data reviewed:  Lab Results   Component Value Date/Time    Hemoglobin A1c 6.4 (H) 06/07/2019 10:39 AM    Hemoglobin A1c (POC) 5.9 12/07/2018 10:30 AM         Diabetes Health Maintenance:  Last eye exam: 8/14/18  Last foot exam:6/8/18  Last influenza vaccine: 10/25/18  Last Pneumovax 23 vaccine: 6/8/2018 - will need to repeat 6/2023  Last Prevnar-13 vaccine: due when 65  Hepatitis B Series: unknown  ASA Therapy: none  ACE/ARB Therapy: none  Statin Therapy: none       Assessment/Plan:     1. Diabetes:  a1c at goal <7% and fasting blood sugars near/in goal range of . Patient hasn't had any low blood sugars since stopping glimepiride.  Advised her to start watching portions more closely and to really try to get back to walking to help with the weight loss. [facility] Patient verbalized understanding of the information presented and all of the patients questions were answered. AVS was handed to the patient and information reviewed. Patient advised to call me or Dr. Jake Diez NP with any additional questions or concerns. Follow-up: October  Notification of recommendations will be sent to Dr. Jake Diez NP for review.       Brent Garcia PharmD, BCPS, CDE

## 2019-10-15 NOTE — TELEPHONE ENCOUNTER
Last refill- historical med/supply  Last office visit - 6/7/19  Next office visit -   Future Appointments   Date Time Provider Lisa Cabrera   10/18/2019  8:50 AM Esperanza Moulton NP [id number] [facility]   10/18/2019  9:00 AM Esperanza Moulton NP [facility] SCHED         Requested Prescriptions     Pending Prescriptions Disp Refills    glucose blood VI test strips (BLOOD GLUCOSE TEST) strip       Sig: by Does Not Apply route See Admin Instructions.  Indications: truemetrix test strips

## 2019-10-15 NOTE — TELEPHONE ENCOUNTER
Requested Prescriptions     Pending Prescriptions Disp Refills    glucose blood VI test strips (BLOOD GLUCOSE TEST) strip       Sig: by Does Not Apply route See Admin Instructions.  Indications: truemetrix test strips     True metrix blood glucose test strips  06/07/2019  10/18/2019  [facility] on file

## 2019-10-16 NOTE — PROGRESS NOTES
Subjective: Miya Buckner is a 59 y.o. female who presents today for CPE    DM2:  Metformin XR 1500mg daily  Last A1c 6.4%  Limits carbs  Walks 5000 steps a day, restoring her moms house, house work  Eye exam UTD, seeing every 6 months. Dr. Joaquim Bergman    Has gained 5lb which she is disappointed about  May been increasing her portion sizes a bit    Dyslipidemia:   No meds  Last     Vit D Def:  Taking daily otc supplement    Insomnia  Reviewed sleep hygiene with patient last visit    Also today reporting feeling a bit stuffy  Started with a scratchy throat  Took mucinex dm once  Last night woke up a few times with drainage in her throat    Health maintenance:    Last pap:  UTD, Dr. Zahra Zuñiga, [facility], s/p CHON BSO, still having paps   Last mammogram:  UTD, Dr. Zahra Zuñiga, [facility]  Last colonoscopy: [address] 5 years due to family history, 11/2014- due next month- Dr. Ian Ryan, family hist colon cancer. Scheduled for 11/25/19      Patient Active Problem List    Diagnosis Date Noted    Vitamin D insufficiency 08/03/2018    Other hyperlipidemia 08/03/2018    Actinic keratosis 06/14/2018    History of colonoscopy 06/12/2018    Diverticulosis 06/12/2018    History of basal cell carcinoma 06/08/2018    Type II diabetes mellitus ([facility] [address] 05/11/2018     Current Outpatient Medications   Medication Sig Dispense Refill    glucose blood VI test strips (BLOOD GLUCOSE TEST) strip Check BG 3x/day, Dx E11.9  Indications: truemetrix test strips 100 Strip 6    metFORMIN ER (GLUCOPHAGE XR) 500 mg tablet Take 3 Tabs by mouth daily (with dinner). 270 Tab 1    Blood-Glucose Meter monitoring kit by Does Not Apply route.  Indications: Truemetrix -[employer] monitor      lancets 31 gauge misc Use to test blood glucose once daily (Patient taking differently: Use to test blood glucose once daily  Indications: one touch ultra soft lancets) 100 Lancet 11        Review of Systems    A comprehensive review of systems was negative except for that written in the HPI. Objective:     Visit Vitals  /70 (BP 1 Location: Left arm, BP Patient Position: Sitting)   Pulse 86   Temp 98.2 °F (36.8 °C) (Oral)   Resp 16   Ht 5' 0.5\" (1.537 m)   Wt 186 lb 6.4 oz (84.6 kg)   SpO2 96%   BMI 35.80 kg/m²     General:  Alert, cooperative, no distress, appears stated age. Head:  Normocephalic, without obvious abnormality, atraumatic. Eyes:  Conjunctivae/corneas clear. PERRL, EOMs intact   Ears:  Normal TMs and external ear canals both ears. Nose: Nares normal. Septum midline. Mucosa normal   Throat: Lips, mucosa, and tongue normal. Teeth and gums normal.   Neck: Supple, symmetrical, trachea midline, no adenopathy, thyroid: no enlargement/tenderness/nodules, no carotid bruit and no JVD. Back:   Symmetric, no curvature. ROM normal.    Lungs:   Clear to auscultation bilaterally. Chest wall:  No tenderness or deformity. Heart:  Regular rate and rhythm, 2/6 systolic murmur   Abdomen:   Soft, non-tender. Bowel sounds normal. No masses,  No organomegaly. Extremities: Extremities normal, atraumatic, no cyanosis or edema. Pulses: 2+ and symmetric all extremities. Skin: Skin color, texture, turgor normal. No rashes or lesions. Lymph nodes: Cervical, supraclavicular nodes normal.   Neurologic: CNII-XII intact. Normal strength, sensation throughout. Assessment/Plan:     1. Annual physical exam  -  UTD, already had flu shot  - encouraged watching diet, reducing portions, increasing activity  - reviewed risk factors for heart disease with patient, agreeable to CT Heart  - REFERRAL FOR COLONOSCOPY  - LIPID PANEL  - METABOLIC PANEL, COMPREHENSIVE  - CBC WITH AUTOMATED DIFF  - TSH 3RD GENERATION  - HEMOGLOBIN A1C WITH EAG  - VITAMIN D, 25 HYDROXY  - AMB POC EKG ROUTINE W/ 12 LEADS, INTER & REP  - CT HEART W/O CONT WITH CALCIUM; Future    2.  Type 2 diabetes mellitus without complication, without long-term current use of insulin (HCC)  - cont metformin  - encouraged watching diet, reducing portions, increasing activity  - agreeable to starting statin if needed at next lab check  - consider ACEI or ARB addition next visit  - CT HEART W/O CONT WITH CALCIUM; Future    3. Other hyperlipidemia  - no statin, patient agreeable to starting if next lipid panel not at goal  - LIPID PANEL  - METABOLIC PANEL, COMPREHENSIVE  - CT HEART W/O CONT WITH CALCIUM; Future    4. Vitamin D insufficiency  - cont daily supplement  - VITAMIN D, 25 HYDROXY    5. BMI 35.0-35.9,adult  - reviewed weight gain with patient, reduce portion sizes, increase activity level, etc  - CT HEART W/O CONT WITH CALCIUM; Future    6. History of colonoscopy with polypectomy  - already scheduled for 11/25  - REFERRAL FOR COLONOSCOPY  - CBC WITH AUTOMATED DIFF      Advised her to call back or return to office if symptoms worsen/change/persist.  Discussed expected course/resolution/complications of diagnosis in detail with patient. Medication risks/benefits/costs/interactions/alternatives discussed with patient. She was given an after visit summary which includes diagnoses, current medications, & vitals. She expressed understanding with the diagnosis and plan.     RENETTA Stock

## 2019-10-18 ENCOUNTER — PATIENT MESSAGE (OUTPATIENT)
Dept: INTERNAL MEDICINE CLINIC | Age: 64
End: 2019-10-18

## 2019-10-18 ENCOUNTER — OFFICE VISIT (OUTPATIENT)
Dept: INTERNAL MEDICINE CLINIC | Age: 64
End: 2019-10-18

## 2019-10-18 VITALS
DIASTOLIC BLOOD PRESSURE: 70 MMHG | HEART RATE: 86 BPM | OXYGEN SATURATION: 96 % | WEIGHT: 186.4 LBS | TEMPERATURE: 98.2 F | RESPIRATION RATE: 16 BRPM | HEIGHT: 61 IN | BODY MASS INDEX: 35.19 KG/M2 | SYSTOLIC BLOOD PRESSURE: 132 MMHG

## 2019-10-18 DIAGNOSIS — E55.9 VITAMIN D INSUFFICIENCY: ICD-10-CM

## 2019-10-18 DIAGNOSIS — Z98.890 HISTORY OF COLONOSCOPY WITH POLYPECTOMY: ICD-10-CM

## 2019-10-18 DIAGNOSIS — E78.49 OTHER HYPERLIPIDEMIA: ICD-10-CM

## 2019-10-18 DIAGNOSIS — Z00.00 ANNUAL PHYSICAL EXAM: Primary | ICD-10-CM

## 2019-10-18 DIAGNOSIS — E11.9 TYPE 2 DIABETES MELLITUS WITHOUT COMPLICATION, WITHOUT LONG-TERM CURRENT USE OF INSULIN (HCC): ICD-10-CM

## 2019-10-18 DIAGNOSIS — Z86.010 HISTORY OF COLONOSCOPY WITH POLYPECTOMY: ICD-10-CM

## 2019-10-18 PROBLEM — E66.01 SEVERE OBESITY (HCC): Status: ACTIVE | Noted: 2019-10-18

## 2019-10-18 NOTE — PATIENT INSTRUCTIONS
Dr. Gregery Boas  [address] [facility], [address] [facility]   Phone: [phone]      Dr. Perla Juan at [facility] or  [address] [facility], [facility], [address] [place] [address]   Phone: [phone]        Daily antihistamine (claritidne, allegra, or zyrtec) AND a daily nasal steroid (flonase or nasacort)      Schedule your CT: Heart, [phone]

## 2019-10-18 NOTE — PROGRESS NOTES
Chief Complaint   Patient presents with    Complete Physical     Reviewed record in preparation for visit and have obtained necessary documentation. Identified pt with two pt identifiers(name and ). Health Maintenance Due   Topic    FOOT EXAM Q1     Influenza Age 5 to Adult     MICROALBUMIN Q1     COLONOSCOPY          Chief Complaint   Patient presents with    Complete Physical        Wt Readings from Last 3 Encounters:   10/18/19 186 lb 6.4 oz (84.6 kg)   19 181 lb (82.1 kg)   19 182 lb 9.6 oz (82.8 kg)     Temp Readings from Last 3 Encounters:   10/18/19 98.2 °F (36.8 °C) (Oral)   19 98.2 °F (36.8 °C) (Oral)   19 98.6 °F (37 °C) (Oral)     BP Readings from Last 3 Encounters:   10/18/19 140/70   19 122/76   19 140/76     Pulse Readings from Last 3 Encounters:   10/18/19 86   19 74   19 74           Learning Assessment:  :     Learning Assessment 2019   PRIMARY LEARNER Patient Spouse   HIGHEST LEVEL OF EDUCATION - PRIMARY LEARNER  SOME COLLEGE SOME COLLEGE   PRIMARY LANGUAGE ENGLISH ENGLISH   LEARNER PREFERENCE PRIMARY READING VIDEOS   ANSWERED BY patient spouse   RELATIONSHIP SELF SPOUSE       Depression Screening:  :     3 most recent PHQ Screens 2019   Little interest or pleasure in doing things Not at all   Feeling down, depressed, irritable, or hopeless Not at all   Total Score PHQ 2 0       Fall Risk Assessment:  :     No flowsheet data found. Abuse Screening:  :     Abuse Screening Questionnaire 2019   Do you ever feel afraid of your partner? N N   Are you in a relationship with someone who physically or mentally threatens you? N N   Is it safe for you to go home?  Y Y       Coordination of Care Questionnaire:  :     1) Have you been to an emergency room, urgent care clinic since your last visit? no   Hospitalized since your last visit? no             2) Have you seen or consulted any other health care providers outside of [address] [place] [address] since your last visit? no  (Include any pap smears or colon screenings in this section.)    3) Do you have an Advance Directive on file? no    4) Are you interested in receiving information on Advance Directives? NO      Patient is accompanied by self I have received verbal consent from Linda Guillen to discuss any/all medical information while they are present in the room. Reviewed record  In preparation for visit and have obtained necessary documentation.

## 2019-10-21 RX ORDER — METFORMIN HYDROCHLORIDE 500 MG/1
1500 TABLET, EXTENDED RELEASE ORAL
Qty: 270 TAB | Refills: 2 | Status: SHIPPED | OUTPATIENT
Start: 2019-10-21 | End: 2020-06-29 | Stop reason: SDUPTHER

## 2019-10-21 NOTE — TELEPHONE ENCOUNTER
From: Suly Alvarado  To: Wanda Burks NP  Sent: 10/18/2019 6:16 PM EDT  Subject: Prescription Question    Marla: Please order for me a refill for One Touch Ultra Soft lancets through [address]. Will also need a refill on Metformin 500 mg for 3 daily at dinner, I recently ordered and have for the next 90 days. Today I made an appointment with Dr. Pablito Edwards for February 18, but will need these refills to carry me through till my appointment with Dr. Pablito Ewdards. Will miss you! Thanks for all your help and guidance.     James Ortiz

## 2019-11-21 ENCOUNTER — TELEPHONE (OUTPATIENT)
Dept: INTERNAL MEDICINE CLINIC | Age: 64
End: 2019-11-21

## 2019-11-21 NOTE — TELEPHONE ENCOUNTER
Spoke to patient. Changing offices due to NP Bill leaving. Has appointment scheduled with MD Juan M Caballero from [facility] in February. Has questions about Colonosocpy prep next week. Advised patient okay to skip metformin er the night before - won't be eating and ER tabs can be harder to process (shell of tablet still has to move through). Patients sugars stable.     Frederick Zamora, RamonD, BCPS, CDE

## 2019-11-21 NOTE — TELEPHONE ENCOUNTER
----- Message from Ramo Aleman sent at 11/20/2019  5:12 PM EST -----  Regarding: Dr Oneill Fee: [phone]  Patient return call    Caller's first and last name and relationship (if not the patient):      Best contact number(s): [phone]      Whose call is being returned:  Brooke Velasquez      Details to clarify the request:  regarding a guidance questions she has for her since she knows her hx, pt is seen at another locaton now but just has a questions she wants to ask the dietitian       Ramo Aleman

## 2020-02-18 ENCOUNTER — OFFICE VISIT (OUTPATIENT)
Dept: FAMILY MEDICINE CLINIC | Age: 65
End: 2020-02-18

## 2020-02-18 VITALS
WEIGHT: 192 LBS | HEART RATE: 85 BPM | DIASTOLIC BLOOD PRESSURE: 80 MMHG | HEIGHT: 60 IN | SYSTOLIC BLOOD PRESSURE: 130 MMHG | TEMPERATURE: 98.1 F | RESPIRATION RATE: 18 BRPM | OXYGEN SATURATION: 99 % | BODY MASS INDEX: 37.69 KG/M2

## 2020-02-18 DIAGNOSIS — E55.9 VITAMIN D INSUFFICIENCY: ICD-10-CM

## 2020-02-18 DIAGNOSIS — E11.9 TYPE 2 DIABETES MELLITUS WITHOUT COMPLICATION, WITHOUT LONG-TERM CURRENT USE OF INSULIN (HCC): Primary | ICD-10-CM

## 2020-02-18 DIAGNOSIS — E66.01 SEVERE OBESITY (HCC): ICD-10-CM

## 2020-02-18 DIAGNOSIS — Z90.710 H/O: HYSTERECTOMY: ICD-10-CM

## 2020-02-18 DIAGNOSIS — E78.49 OTHER HYPERLIPIDEMIA: ICD-10-CM

## 2020-02-18 LAB — HBA1C MFR BLD HPLC: 6.7 %

## 2020-02-18 RX ORDER — ROSUVASTATIN CALCIUM 5 MG/1
5 TABLET, COATED ORAL
Qty: 90 TAB | Refills: 1 | Status: SHIPPED | OUTPATIENT
Start: 2020-02-18 | End: 2020-07-29 | Stop reason: SDUPTHER

## 2020-02-18 NOTE — PROGRESS NOTES
Damir Garcia is a 59 y.o. female    Chief Complaint   Patient presents with   [facility] Establish Care     1. Have you been to the ER, urgent care clinic since your last visit? Hospitalized since your last visit? No      2. Have you seen or consulted any other health care providers outside of the [address] [place] [address] since your last visit? Include any pap smears or colon screening.  No

## 2020-02-18 NOTE — PROGRESS NOTES
Rick Loja  59 y.o. female  1955  [address]  [id number]     [address] [facility]       Encounter Date: 2020           New Patient Visit Note: Gt Cast MD      Reason for Appointment:  Chief Complaint   Patient presents with   [facility] Establish Care       History of Present Illness:  History provided by patient    Rick Loja is a 59 y.o. female who presents to clinic today for     DM2:  Metformin XR 1500mg daily  Prior Medicines: Glimipridie  Last A1c 6.7% (today)  FB-132  MBG: not checking  Diet: trying to limit carbs and previously saw a nutritionist   Walks stays active around the house  JACQUES:. Followed by  [facility] with visits q 6 months (scheduled for visit tomorrow)  Pneumonia Vaccine: Pneumonia 23 vaccine given on 2018  Statin: not on statin       Dyslipidemia:   Medicines: not on any statin  Last  ()     Vit D Def:  Medicines: not taking any medicine      Health maintenance:    Last pap:  [facility], Dr. Benita Gomez, [address] [place] [address] [facility] still having [facility]   Last mammogram:  UTNEIL, Dr. Benita Gomez, [address] [place] [address] [facility]  Last colonoscopy:  [address] years due to family history, 2014- due next month- Dr. Maribell Rendon, family hist colon cancer. Had colonsocopy 2019 with normal results. Review of Systems  Const: denies fatigue, denies fever, denies chills  Cardiac: denies palpitations or chest pain  Resp: denies dyspnea, denies wheezing      Allergies: Codeine and Tetanus and diphther.  tox (pf)    Medications: (Updated to reflect final medication list after visit)    Current Outpatient Medications:     rosuvastatin (CRESTOR) 5 mg tablet, Take 1 Tab by mouth nightly., Disp: 90 Tab, Rfl: 1    lancets 31 gauge misc, Use to test blood glucose once daily  Indications: one touch ultra soft lancets, Disp: 100 Lancet, Rfl: 11    metFORMIN ER (GLUCOPHAGE XR) 500 mg tablet, Take 3 Tabs by mouth daily (with dinner). , Disp: 270 Tab, Rfl: 2    glucose blood VI test strips (BLOOD GLUCOSE TEST) strip, Check BG 3x/day, Dx E11.9  Indications: truemetrix test strips, Disp: 100 Strip, Rfl: 6    Blood-Glucose Meter monitoring kit, by Does Not Apply route. Indications: Truemetrix -[employer] monitor, Disp: , Rfl:     History  Patient Care Team:  Sunny Rodriguez MD as PCP - General (Family Practice)  Sunny Rodriguez MD as PCP - [facility]  Davis De Oliveira MD (Obstetrics & Gynecology)  Becky Montes De Oca MD (Colon and Rectal Surgery)    Past Medical History: she has a past medical history of Diabetes ([address] Past Surgical History: she has a past surgical history that includes pr breast surgery procedure unlisted (1999); hx gyn (2003); and hx cyst removal (1980). Family Medical History: family history includes Cancer in her father; Marry Sida in her mother; Migraines in her father; Parkinson's Disease in her father; Stroke in her father. Social History: she reports that she has never smoked. She has never used smokeless tobacco. She reports that she does not drink alcohol or use drugs. Objective:   Visit Vitals  /80 (BP 1 Location: Left arm, BP Patient Position: Sitting)   Pulse 85   Temp 98.1 °F (36.7 °C) (Oral)   Resp 18   Ht 5' (1.524 m)   Wt 192 lb (87.1 kg)   SpO2 99%   BMI 37.50 kg/m²     Wt Readings from Last 3 Encounters:   02/18/20 192 lb (87.1 kg)   10/18/19 186 lb 6.4 oz (84.6 kg)   08/02/19 181 lb (82.1 kg)       Physical Exam  Vitals signs reviewed. Constitutional:       General: She is not in acute distress. Appearance: Normal appearance. She is normal weight. She is not ill-appearing or toxic-appearing. HENT:      Head: Normocephalic and atraumatic. Right Ear: Hearing, tympanic membrane, ear canal and external ear normal. No drainage. No middle ear effusion. There is no impacted cerumen. Tympanic membrane is not injected or erythematous.       Left Ear: Hearing, tympanic membrane, ear canal and external ear normal. No drainage. No middle ear effusion. There is no impacted cerumen. Tympanic membrane is not injected or erythematous. Nose: Nose normal. No nasal deformity or septal deviation. Mouth/Throat:      Lips: Pink. No lesions. Mouth: Mucous membranes are moist.      Palate: No mass. Pharynx: Oropharynx is clear. Uvula midline. No pharyngeal swelling, oropharyngeal exudate or posterior oropharyngeal erythema. Tonsils: No tonsillar exudate. Eyes:      General: Lids are normal. Vision grossly intact. Gaze aligned appropriately. Right eye: No discharge. Left eye: No discharge. Extraocular Movements: Extraocular movements intact. Conjunctiva/sclera: Conjunctivae normal.      Right eye: Right conjunctiva is not injected. Left eye: Left conjunctiva is not injected. Pupils: Pupils are equal, round, and reactive to light. Neck:      Musculoskeletal: Normal range of motion and neck supple. No neck rigidity or muscular tenderness. Vascular: No carotid bruit. Cardiovascular:      Rate and Rhythm: Normal rate and regular rhythm. Pulses: Normal pulses. Heart sounds: Normal heart sounds. No murmur. No friction rub. No gallop. Pulmonary:      Effort: Pulmonary effort is normal. No respiratory distress. Breath sounds: Normal breath sounds. No stridor. No wheezing, rhonchi or rales. Abdominal:      General: Bowel sounds are normal. There is no distension or abdominal bruit. Tenderness: There is no abdominal tenderness. There is no guarding. Musculoskeletal: Normal range of motion. Cervical back: Normal.   Lymphadenopathy:      Cervical: No cervical adenopathy. Skin:     General: Skin is warm. Coloration: Skin is not jaundiced. Neurological:      General: No focal deficit present. Mental Status: She is alert. Motor: Motor function is intact. Coordination: Coordination is intact. Gait: Gait is intact. Deep Tendon Reflexes:      Reflex Scores:       Patellar reflexes are 2+ on the right side and 2+ on the left side. Psychiatric:         Attention and Perception: Attention and perception normal.         Mood and Affect: Mood and affect normal.         Speech: Speech normal.         Cognition and Memory: Cognition and memory normal.         Assessment & Plan:    Diagnoses and all orders for this visit:    1. Type 2 diabetes mellitus without complication, without long-term current use of insulin ([facility].): Chronic, controlled based on last A1c. Discussed recommendation for starting statin given DM2; patient expresses agreement. Will check further labs. -     AMB POC HEMOGLOBIN A1C  -     rosuvastatin (CRESTOR) 5 mg tablet; Take 1 Tab by mouth nightly. -     METABOLIC PANEL, COMPREHENSIVE  -     MICROALBUMIN, UR, RAND W/ MICROALB/CREAT RATIO  -     TSH 3RD GENERATION  -     CBC WITH AUTOMATED DIFF    2. Vitamin D insufficiency: Chronic, unclear control  -     VITAMIN D, 25 HYDROXY    3. Other hyperlipidemia: Chronic, will start Crestor given hx of DM2. Patient expresses agreement.   -     LIPID PANEL  - Start Crestor    4. Severe obesity ([facility]): I have reviewed/discussed the above normal BMI with the patient. I have recommended the following interventions: dietary management education, guidance, and counseling, encourage exercise, monitor weight and prescribed dietary intake. 5. H/O: hysterectomy; Chronic, stable  - f/u with OBGYN as scheduled    Other orders  -     CVD REPORT        I have discussed the diagnosis with the patient and the intended plan as seen in the above orders. The patient has received an after-visit summary along with patient information handout. I have discussed medication side effects and warnings with the patient as well.     Dispostion  Follow-up and Dispositions    · Return in about 3 months (around 5/18/2020) for diabetes.            Rachel Arcos MD

## 2020-02-18 NOTE — PATIENT INSTRUCTIONS
Rosuvastatin (By mouth)   Rosuvastatin (xjv-nxt-wl-STAT-in)  Treats high cholesterol and triglyceride levels. May reduce the risk of heart attack, stroke, and related health conditions. This medicine is a statin. Brand Name(s): Crestor   There may be other brand names for this medicine. When This Medicine Should Not Be Used: This medicine is not right for everyone. Do not use it if you had an allergic reaction to rosuvastatin, you have active liver disease, or you are pregnant or breastfeeding. How to Use This Medicine:   Tablet  · Take your medicine as directed. Your dose may need to be changed several times to find what works best for you. · Swallow the tablet whole. Do not crush, break, or chew it. · Read and follow the patient instructions that come with this medicine. Talk to your doctor or pharmacist if you have any questions. · Missed dose: Take a dose as soon as you remember. If it is almost time for your next dose, wait until then and take a regular dose. Do not take extra medicine to make up for a missed dose. Do not take 2 doses within 12 hours. · Store the medicine in a closed container at room temperature, away from heat, moisture, and direct light. Drugs and Foods to Avoid:   Ask your doctor or pharmacist before using any other medicine, including over-the-counter medicines, vitamins, and herbal products. · Some medicines can affect how rosuvastatin works. Tell your doctor if you are taking any of the following:   ¨ A blood thinner, such as warfarin  ¨ Cimetidine  ¨ Cyclosporine  ¨ Medicine to treat an infection, such as erythromycin, fluconazole, itraconazole, ketoconazole, atazanavir/ritonavir, or lopinavir/ritonavir  ¨ Niacin (Vitamin B3)  ¨ Spironolactone  · If you need to take an antacid that contains aluminum and magnesium, take the antacid at least 2 hours after you take rosuvastatin. Warnings While Using This Medicine:   · It is not safe to take this medicine during pregnancy.  It could harm an unborn baby. Tell your doctor right away if you become pregnant. · Tell your doctor if you have kidney disease, diabetes, an underactive thyroid, a history of liver disease, or muscle pain or weakness. Tell your doctor if you usually have more than 2 drinks of alcohol per day. · Tell any doctor or dentist who treats you that you are using this medicine. You may need to stop using this medicine several days before you have surgery or medical tests. · Your doctor will do lab tests at regular visits to check on the effects of this medicine. Keep all appointments. · Keep all medicine out of the reach of children. Never share your medicine with anyone. Possible Side Effects While Using This Medicine:   Call your doctor right away if you notice any of these side effects:  · Allergic reaction: Itching or hives, swelling in your face or hands, swelling or tingling in your mouth or throat, chest tightness, trouble breathing  · Change in how much or how often you urinate, cloudy urine, painful urination  · Dark urine or pale stools, nausea, vomiting, loss of appetite, stomach pain, yellow skin or eyes  · Muscle pain, tenderness, or weakness  · Swelling in your hands, ankles, or feet  · Unusual tiredness  If you notice other side effects that you think are caused by this medicine, tell your doctor. Call your doctor for medical advice about side effects. You may report side effects to FDA at 9-371-FDA-5413  © 2017 [address] Information is for End User's use only and may not be sold, redistributed or otherwise used for commercial purposes. The above information is an  only. It is not intended as medical advice for individual conditions or treatments. Talk to your doctor, nurse or pharmacist before following any medical regimen to see if it is safe and effective for you.        Statins: Care Instructions  Your Care Instructions    Statins are medicines that lower your cholesterol and your risk for a heart attack and stroke. Cholesterol is a type of fat in your blood. If you have too much cholesterol, it can build up in blood vessels. This raises your risk of heart disease, heart attack, and stroke. Statins lower cholesterol by blocking how much your body makes. This prevents cholesterol from building up in your blood vessels. This is called hardening of the arteries. It is the starting point for some heart and blood flow problems, such as heart disease. Statins may also reduce inflammation around the buildup (called plaque). This can lower the risk that the plaque will break apart and lead to a heart attack or stroke. A heart-healthy lifestyle is important for lowering your risk whether you take statins or not. This includes eating healthy foods, being active, staying at a healthy weight, and not smoking. You must take statins regularly for them to work well. If you stop, your cholesterol and your risk will go back up. Examples of statins include:  · Atorvastatin (Lipitor). · Lovastatin (Mevacor). · Pravastatin (Pravachol). · Simvastatin (Zocor). Statins interact with many medicines. So tell your doctor all of the other medicines that you take. These include prescription medicines, over-the-counter medicines, dietary supplements, and herbal products. Follow-up care is a key part of your treatment and safety. Be sure to make and go to all appointments, and call your doctor if you are having problems. It's also a good idea to know your test results and keep a list of the medicines you take. How can you care for yourself at home? · Take statins exactly as your doctor tells you. High cholesterol has no symptoms. So it is easy to forget to take the pills. Try to make a system that reminds you to take them. · Do not take two or more medicines at the same time unless the doctor told you to. Statins can interact with other medicines.   · Always tell your doctor if you think you are having a side effect. If side effects are a problem with one medicine, a different one may be used. · Keep making the lifestyle changes your doctor suggests. Eat heart-healthy foods, be active, don't smoke, and stay at a healthy weight. · Talk to your doctor about avoiding grapefruit juice if you take statins. Grapefruit juice can raise the level of this medicine in your blood. This could increase side effects. When should you call for help? Watch closely for changes in your health, and be sure to contact your doctor if:    · You think you are having problems with your medicine.     · You have aches or muscle pain. Where can you learn more? Go to http://sheila-tonia.info/. Enter R358 in the search box to learn more about \"Statins: Care Instructions. \"  Current as of: April 9, 2019  Content Version: 12.2  © 9933-8071 [employer]. Care instructions adapted under license by [employer] (which disclaims liability or warranty for this information). If you have questions about a medical condition or this instruction, always ask your healthcare professional. [address] any warranty or liability for your use of this information. Noninsulin Medicines for Type 2 Diabetes: Care Instructions  Your Care Instructions    There are different types of noninsulin medicines for diabetes. Each works in a different way. But they all help you control your blood sugar. Some types help your body make insulin to lower your blood sugar. Others lower how much insulin your body needs. Some can slow how fast your body digests sugars. And some can remove extra glucose through your urine. · Alpha-glucosidase inhibitors. These keep starches from breaking down. This means that they lower the amount of glucose absorbed when you eat. They don't help your body make more insulin.  So they will not cause low blood sugar unless you use them with other medicines for diabetes. They include acarbose and miglitol. · DPP-4 inhibitors. These help your body raise the level of insulin after you eat. They also help your body make less of a hormone that raises blood sugar. They include linagliptin, saxagliptin, and sitagliptin. · Incretin hormones (GLP-1 receptor agonists). Your body makes a protein that can raise your insulin level. It also can lower your blood sugar and make you less hungry. You can get shots of hormones that work the same way. They include exenatide and liraglutide. · Meglitinides. These help your body release insulin. They also help slow how your body digests sugars. So they can keep your blood sugar from rising too fast after you eat. They include nateglinide and repaglinide. · Metformin. This lowers how much glucose your liver makes. And it helps you respond better to insulin. It also lowers the amount of stored sugar that your liver releases when you are not eating. · SGLT2 inhibitors. These help to remove extra glucose through your urine. They may also help some people lose weight. They include canagliflozin, dapagliflozin, and empagliflozin. · Sulfonylureas. These help your body release more insulin. Some work for many hours. They can cause low blood sugar if you don't eat as you planned. They include glipizide and glyburide. · Thiazolidinediones. These reduce the amount of blood glucose. They also help you respond better to insulin. They include pioglitazone and rosiglitazone. You may need to take more than one medicine for diabetes. Two or more medicines may work better to lower your blood sugar level than just one does. Follow-up care is a key part of your treatment and safety. Be sure to make and go to all appointments, and call your doctor if you are having problems. It's also a good idea to know your test results and keep a list of the medicines you take. How can you care for yourself at home? · Eat a healthy diet. Get some exercise each day. This may help you to reduce how much medicine you need. · Do not take other prescription or over-the-counter medicines, vitamins, herbal products, or supplements without talking to your doctor first. Some medicines for type 2 diabetes can cause problems with other medicines or supplements. · Tell your doctor if you plan to get pregnant. Some of these drugs are not safe for pregnant women. · Be safe with medicines. Take your medicines exactly as prescribed. Meglitinides and sulfonylureas can cause your blood sugar to drop very low. Call your doctor if you think you are having a problem with your medicine. · Check your blood sugar often. You can use a glucose monitor. Keeping track can help you know how certain foods, activities, and medicines affect your blood sugar. And it can help you keep your blood sugar from getting so low that it's not safe. When should you call for help? Call 911 anytime you think you may need emergency care. For example, call if:    · You passed out (lost consciousness).     · You are confused or cannot think clearly.     · Your blood sugar is very high or very low.    Watch closely for changes in your health, and be sure to contact your doctor if:    · Your blood sugar stays outside the level your doctor set for you.     · You have any problems. Where can you learn more? Go to http://sheila-tonia.info/. Enter H153 in the search box to learn more about \"Noninsulin Medicines for Type 2 Diabetes: Care Instructions. \"  Current as of: April 16, 2019  Content Version: 12.2  © 1930-3547 [employer]. Care instructions adapted under license by [employer] (which disclaims liability or warranty for this information). If you have questions about a medical condition or this instruction, always ask your healthcare professional. [place] [address] any warranty or liability for your use of this information.

## 2020-02-21 LAB
25(OH)D3+25(OH)D2 SERPL-MCNC: 15.8 NG/ML (ref 30–100)
ALBUMIN SERPL-MCNC: 4.1 G/DL (ref 3.8–4.8)
ALBUMIN/CREAT UR: 16 MG/G CREAT (ref 0–29)
ALBUMIN/GLOB SERPL: 1.6 {RATIO} (ref 1.2–2.2)
ALP SERPL-CCNC: 88 IU/L (ref 39–117)
ALT SERPL-CCNC: 9 IU/L (ref 0–32)
AST SERPL-CCNC: 15 IU/L (ref 0–40)
BASOPHILS # BLD AUTO: 0.1 X10E3/UL (ref 0–0.2)
BASOPHILS NFR BLD AUTO: 1 %
BILIRUB SERPL-MCNC: 0.4 MG/DL (ref 0–1.2)
BUN SERPL-MCNC: 11 MG/DL (ref 8–27)
BUN/CREAT SERPL: 17 (ref 12–28)
CALCIUM SERPL-MCNC: 9.6 MG/DL (ref 8.7–10.3)
CHLORIDE SERPL-SCNC: 101 MMOL/L (ref 96–106)
CHOLEST SERPL-MCNC: 202 MG/DL (ref 100–199)
CO2 SERPL-SCNC: 24 MMOL/L (ref 20–29)
CREAT SERPL-MCNC: 0.64 MG/DL (ref 0.57–1)
CREAT UR-MCNC: 112.8 MG/DL
EOSINOPHIL # BLD AUTO: 0.1 X10E3/UL (ref 0–0.4)
EOSINOPHIL NFR BLD AUTO: 2 %
ERYTHROCYTE [DISTWIDTH] IN BLOOD BY AUTOMATED COUNT: 12.4 % (ref 11.7–15.4)
GLOBULIN SER CALC-MCNC: 2.5 G/DL (ref 1.5–4.5)
GLUCOSE SERPL-MCNC: 129 MG/DL (ref 65–99)
HCT VFR BLD AUTO: 40.4 % (ref 34–46.6)
HDLC SERPL-MCNC: 62 MG/DL
HGB BLD-MCNC: 13.3 G/DL (ref 11.1–15.9)
IMM GRANULOCYTES # BLD AUTO: 0 X10E3/UL (ref 0–0.1)
IMM GRANULOCYTES NFR BLD AUTO: 0 %
INTERPRETATION, 910389: NORMAL
LDLC SERPL CALC-MCNC: 123 MG/DL (ref 0–99)
LYMPHOCYTES # BLD AUTO: 2.7 X10E3/UL (ref 0.7–3.1)
LYMPHOCYTES NFR BLD AUTO: 34 %
MCH RBC QN AUTO: 30.4 PG (ref 26.6–33)
MCHC RBC AUTO-ENTMCNC: 32.9 G/DL (ref 31.5–35.7)
MCV RBC AUTO: 92 FL (ref 79–97)
MICROALBUMIN UR-MCNC: 18.3 UG/ML
MONOCYTES # BLD AUTO: 0.5 X10E3/UL (ref 0.1–0.9)
MONOCYTES NFR BLD AUTO: 6 %
NEUTROPHILS # BLD AUTO: 4.6 X10E3/UL (ref 1.4–7)
NEUTROPHILS NFR BLD AUTO: 57 %
PLATELET # BLD AUTO: 251 X10E3/UL (ref 150–450)
POTASSIUM SERPL-SCNC: 4.3 MMOL/L (ref 3.5–5.2)
PROT SERPL-MCNC: 6.6 G/DL (ref 6–8.5)
RBC # BLD AUTO: 4.38 X10E6/UL (ref 3.77–5.28)
SODIUM SERPL-SCNC: 141 MMOL/L (ref 134–144)
TRIGL SERPL-MCNC: 83 MG/DL (ref 0–149)
TSH SERPL DL<=0.005 MIU/L-ACNC: 1.6 UIU/ML (ref 0.45–4.5)
VLDLC SERPL CALC-MCNC: 17 MG/DL (ref 5–40)
WBC # BLD AUTO: 8 X10E3/UL (ref 3.4–10.8)

## 2020-03-06 ENCOUNTER — TELEPHONE (OUTPATIENT)
Dept: FAMILY MEDICINE CLINIC | Age: 65
End: 2020-03-06

## 2020-03-06 DIAGNOSIS — E55.9 VITAMIN D DEFICIENCY: Primary | ICD-10-CM

## 2020-03-06 RX ORDER — ERGOCALCIFEROL 1.25 MG/1
50000 CAPSULE ORAL
Qty: 12 CAP | Refills: 0 | Status: SHIPPED | OUTPATIENT
Start: 2020-03-06 | End: 2020-05-09 | Stop reason: SDUPTHER

## 2020-03-09 ENCOUNTER — TELEPHONE (OUTPATIENT)
Dept: FAMILY MEDICINE CLINIC | Age: 65
End: 2020-03-09

## 2020-03-09 NOTE — TELEPHONE ENCOUNTER
Outbound call to patient. Patient wanted to make sure there were no other concerns regarding lab work. Advised patient that there we no other concerns at this time. Patient verbalized understanding.

## 2020-03-09 NOTE — TELEPHONE ENCOUNTER
----- Message from Telma Ace sent at 3/9/2020  9:05 AM EDT -----  Regarding: Delfin/ telephone  Contact: [phone]  Caller's first and last name: pt  Reason for call: Lab work  Callback required yes/no and why: yes  Best contact number(s): [phone]  Details to clarify the request: Pt is returning missed call from practice in reference to lab work on 2/18/2020. Pt would like to notify PCP that she has started taking Vitamin D medication that was prescribed.

## 2020-04-22 ENCOUNTER — TELEPHONE (OUTPATIENT)
Dept: FAMILY MEDICINE CLINIC | Age: 65
End: 2020-04-22

## 2020-04-22 NOTE — TELEPHONE ENCOUNTER
Patient states that she is experiencing some high blood pressure. Blood pressure reading 180/89    Patient states that she is having some dizziness almost to the point of losing her balance. She states that she does not feel well. Blood sugar was 165, and 2nd  reading was 148 this morning. Advised patient to go to Urgent Care due to having dizziness to the point of losing balance and blood pressure slightly elevated.     Patient verbalized understanding and will [facility] to [facility].

## 2020-04-29 ENCOUNTER — VIRTUAL VISIT (OUTPATIENT)
Dept: FAMILY MEDICINE CLINIC | Age: 65
End: 2020-04-29

## 2020-04-29 DIAGNOSIS — E78.49 OTHER HYPERLIPIDEMIA: ICD-10-CM

## 2020-04-29 DIAGNOSIS — R03.0 ELEVATED BLOOD PRESSURE, SITUATIONAL: ICD-10-CM

## 2020-04-29 DIAGNOSIS — E11.9 TYPE 2 DIABETES MELLITUS WITHOUT COMPLICATION, WITHOUT LONG-TERM CURRENT USE OF INSULIN (HCC): ICD-10-CM

## 2020-04-29 DIAGNOSIS — R42 DIZZINESS: Primary | ICD-10-CM

## 2020-04-29 RX ORDER — MECLIZINE HYDROCHLORIDE 25 MG/1
25 TABLET ORAL
COMMUNITY
End: 2020-07-31

## 2020-04-29 RX ORDER — LANCETS
EACH MISCELLANEOUS
COMMUNITY
Start: 2020-01-24 | End: 2020-05-18

## 2020-04-29 RX ORDER — LANOLIN ALCOHOL/MO/W.PET/CERES
CREAM (GRAM) TOPICAL
COMMUNITY
End: 2020-04-29

## 2020-04-29 NOTE — PROGRESS NOTES
Jessa Galan  72 y.o. female  1955  [facility] [address]  [id number]     [address] [facility]       Encounter Date: 4/29/2020           Established Patient Visit Note: Mayda Braxton MD    Reason for Appointment:  Chief Complaint   Patient presents with    Diabetes     follow up    Other     Urgent Care visit for dizziness 4/22       History of Present Illness:  History provided by patient    Jessa Galan is a 72 y.o. female who presents today for:    Dizziness   Patient reports that she work up on the morning of 4/22/2020 with the feeling of he eyes going side to side. She states that she stayed in bed get oriented then she went to the bathroom. She was able to make it to the bathroom okay, but then as she was going back to sleep she felt like her eyes were going side to side again. She reports that her symptoms were worse with turning her head both the the left and to the right. She also reports that her symptoms were worse with bending forward. She called out to her  who helped her down the steps. She took her morning  medicines and checked her blood sugar, which was 165. She rechecked her blood sugar which as 148. She ate a light breakfast. She checked her blood pressure, which she reports as  180/89. She went to urgent care for further evaluation. Initially her blood pressure  was found to be 465M systolic. After relaxing and waiting several minutes it was rechecked with readings in the 650R systolic. She was given Meclizine 25mg PRN q6-8 hrs PRN dizziness. She took this throughout the rest of the week and weekend. She reports that she started decreasing her meclizine use earlier this week and has not needed it since yesterday. She reports that her dizziness has improved. She denies any weakness in her hands, slurring of speech, or loss of sensation.  She reports that today she is now completely back to normal,  She reports that her balance has returned and she no longer feels dizzy. DM2:  Metformin XR 1500mg daily  Prior Medicines: Glimipridie  Last A1c 6.7% (2020)  FB-130s on average  Diet: trying to limit carbs and previously saw a nutritionist   Walks stays active around the house  JACQUES:. last performed by Dr. Karly Green in 2020; has follow up scheduled for May, 2020  Pneumonia Vaccine: Pneumonia 23 vaccine given on 2018  Statin: Crestor 5mg qhs     Dyslipidemia:   Medicines: Crestor 5mg qhs  Last  ()      Review of Systems  Review of Systems   Constitutional: Negative for chills and fever. Respiratory: Negative for cough, shortness of breath and wheezing. Cardiovascular: Negative for chest pain and palpitations. Allergies: Codeine and Tetanus and diphther. tox (pf)    Medications: (Updated to reflect final medication list after visit)    Current Outpatient Medications:     meclizine (ANTIVERT) 25 mg tablet, Take 25 mg by mouth three (3) times daily as needed for Dizziness. , Disp: , Rfl:     ergocalciferol (ERGOCALCIFEROL) 1,250 mcg (50,000 unit) capsule, Take 1 Cap by mouth every seven (7) days. , Disp: 12 Cap, Rfl: 0    rosuvastatin (CRESTOR) 5 mg tablet, Take 1 Tab by mouth nightly., Disp: 90 Tab, Rfl: 1    lancets 31 gauge misc, Use to test blood glucose once daily  Indications: one touch ultra soft lancets, Disp: 100 Lancet, Rfl: 11    metFORMIN ER (GLUCOPHAGE XR) 500 mg tablet, Take 3 Tabs by mouth daily (with dinner). , Disp: 270 Tab, Rfl: 2    glucose blood VI test strips (BLOOD GLUCOSE TEST) strip, Check BG 3x/day, Dx E11.9  Indications: truemetrix test strips, Disp: 100 Strip, Rfl: 6    Blood-Glucose Meter monitoring kit, by Does Not Apply route.  Indications: Truemetrix -Sun[employer] monitor, Disp: , Rfl:     OneTouch UltraSoft Lancets misc, , Disp: , Rfl:     History  Patient Care Team:  Kylie Caro MD as PCP - General (Family Practice)  Kylie Caro MD as PCP - [facility] Provider  Otis Epps MD (Obstetrics & Gynecology)  Aga Ward MD (Colon and Rectal Surgery)    Past Medical History: she has a past medical history of Diabetes ([address] Past Surgical History: she has a past surgical history that includes pr breast surgery procedure unlisted (1999); hx gyn (2003); and hx cyst removal (1980). Family Medical History: family history includes Cancer in her father; Hildred Last in her mother; Migraines in her father; Parkinson's Disease in her father; Stroke in her father. Social History: she reports that she has never smoked. She has never used smokeless tobacco. She reports that she does not drink alcohol or use drugs. Objective:   Vital Signs  There were no vitals taken for this visit. Unable to obtain full set of vital signs today as patient does not have all equipment for this at home    Physical Exam  Constitutional:       Appearance: Normal appearance. She is well-groomed and normal weight. Eyes:      General: Lids are normal. Vision grossly intact. Gaze aligned appropriately. Conjunctiva/sclera:      Right eye: Right conjunctiva is not injected. Left eye: Left conjunctiva is not injected. Neck:      Musculoskeletal: Full passive range of motion without pain and normal range of motion. Pulmonary:      Effort: Pulmonary effort is normal. No tachypnea, bradypnea, accessory muscle usage or respiratory distress. Skin:     Coloration: Skin is not ashen, cyanotic, jaundiced or mottled. Neurological:      General: No focal deficit present. Mental Status: She is alert. Mental status is at baseline.       Comments:   - she is able to bend forward from standing without feeling dizzy  - she is able to hold her arms up without any drooping of her hands  - there is not slurring of speech   Psychiatric:         Attention and Perception: Attention and perception normal.         Mood and Affect: Mood and affect normal.         Speech: Speech normal.         Behavior: Behavior normal. Behavior is cooperative. Assessment & Plan:      ICD-10-CM ICD-9-CM    1. Dizziness R42 780.4    2. Type 2 diabetes mellitus without complication, without long-term current use of insulin (HCC) E11.9 250.00    3. Other hyperlipidemia E78.49 272.4    4. Elevated blood pressure, situational R03.0 796.2        - Dizziness: resolved with no further symptoms. Given that patient is no longer symptomatic, will hold off on imaging at this time but will consider imaging and referral to neurology if symptoms reoccur. Discussed reasons to call or go to ER. Patient expresses agreement.   - DM2/HLD: Chronic, well controlled; continue current regimen   - Elevated blood pressure: likely 2/2 to situational anxiety in setting of dizziness given that BP returned to normal after sitting and calming down. Will have patient continue to monitor BPs at home and call if they become elevated. Will consider addition of ACE/ARB for elevated BP. Discussed red flag symptoms and reasons to call or go to ED. I was in the office while conducting this encounter. Consent:  She and/or her healthcare decision maker is aware that this patient-initiated Telehealth encounter is a billable service, with coverage as determined by her insurance carrier. She is aware that she may receive a bill and has provided verbal consent to proceed: Yes    This virtual visit was conducted via [address] Pursuant to the emergency declaration under the [facility], [facility] waiver authority and the [employer] General Act, this Virtual  Visit was conducted to reduce the patient's risk of exposure to COVID-19 and provide continuity of care for an established patient. Services were provided through a video synchronous discussion virtually to substitute for in-person clinic visit.   Due to this being a TeleHealth evaluation, many elements of the physical examination are unable to be assessed. Total Time: minutes: 21-30 minutes. I have discussed the diagnosis with the patient and the intended plan as seen in the above orders. The patient has received an after-visit summary along with patient information handout. I have discussed medication side effects and warnings with the patient as well. Disposition  Follow-up and Dispositions    · Return in about 3 months (around 7/29/2020) for labs.            Alma Rosa Skinner MD

## 2020-04-29 NOTE — PROGRESS NOTES
Chief Complaint   Patient presents with    Diabetes     follow up    Other     Urgent Care visit for dizziness 4/22   1. Have you been to the ER, urgent care clinic since your last visit? Hospitalized since your last visit? Patient was seen at [facility] on 4/22 for dizziness. 2. Have you seen or consulted any other health care providers outside of the [address] [place] [address] since your last visit? Include any pap smears or colon screening.  No

## 2020-05-09 DIAGNOSIS — E55.9 VITAMIN D DEFICIENCY: ICD-10-CM

## 2020-05-11 RX ORDER — ERGOCALCIFEROL 1.25 MG/1
50000 CAPSULE ORAL
Qty: 12 CAP | Refills: 1 | Status: SHIPPED | OUTPATIENT
Start: 2020-05-11 | End: 2020-08-03

## 2020-05-11 NOTE — TELEPHONE ENCOUNTER
Last Visit: V.V. 4/29/20, OV 2/18/20 MD Beatrice Daniel  Next Appointment: Not scheduled  Previous Refill Encounter(s): 3/6/20  #12    Requested Prescriptions     Pending Prescriptions Disp Refills    ergocalciferol (ERGOCALCIFEROL) 1,250 mcg (50,000 unit) capsule 12 Cap 1     Sig: Take 1 Cap by mouth every seven (7) days.

## 2020-05-18 ENCOUNTER — PATIENT MESSAGE (OUTPATIENT)
Dept: FAMILY MEDICINE CLINIC | Age: 65
End: 2020-05-18

## 2020-05-18 DIAGNOSIS — E55.9 VITAMIN D DEFICIENCY: ICD-10-CM

## 2020-05-18 DIAGNOSIS — E11.9 TYPE 2 DIABETES MELLITUS WITHOUT COMPLICATION, WITHOUT LONG-TERM CURRENT USE OF INSULIN (HCC): Primary | ICD-10-CM

## 2020-05-18 RX ORDER — INSULIN PUMP SYRINGE, 3 ML
EACH MISCELLANEOUS
Qty: 1 KIT | Refills: 0 | Status: SHIPPED | OUTPATIENT
Start: 2020-05-18

## 2020-05-18 RX ORDER — LANCETS
EACH MISCELLANEOUS
Qty: 120 EACH | Refills: 2 | Status: SHIPPED | OUTPATIENT
Start: 2020-05-18 | End: 2020-05-18 | Stop reason: SDUPTHER

## 2020-05-18 RX ORDER — INSULIN PUMP SYRINGE, 3 ML
EACH MISCELLANEOUS
Qty: 1 KIT | Refills: 0 | Status: SHIPPED | OUTPATIENT
Start: 2020-05-18 | End: 2020-05-18 | Stop reason: SDUPTHER

## 2020-05-18 RX ORDER — LANCETS
EACH MISCELLANEOUS
Qty: 120 EACH | Refills: 2 | Status: SHIPPED | OUTPATIENT
Start: 2020-05-18 | End: 2020-05-19 | Stop reason: SDUPTHER

## 2020-05-19 DIAGNOSIS — E11.9 TYPE 2 DIABETES MELLITUS WITHOUT COMPLICATION, WITHOUT LONG-TERM CURRENT USE OF INSULIN (HCC): ICD-10-CM

## 2020-05-19 RX ORDER — LANCETS
EACH MISCELLANEOUS
Qty: 120 EACH | Refills: 2 | Status: SHIPPED | OUTPATIENT
Start: 2020-05-19 | End: 2021-01-07 | Stop reason: SDUPTHER

## 2020-05-19 NOTE — TELEPHONE ENCOUNTER
Pharmacy requesting clarification on sig to four times daily for medicare. Added four times daily if appropriate. (5/18/20)        Requested Prescriptions     Pending Prescriptions Disp Refills    lancets misc 120 Each 2     Sig: Use to check fasting and meal time blood sugar four times daily. Patient may use whichever product is covered by insurance.  glucose blood VI test strips (blood glucose test) strip 120 Strip 2     Sig: Use to check fasting and meal time blood sugar four times daily. Patient may use whichever product is covered by insurance.

## 2020-06-29 DIAGNOSIS — E11.9 TYPE 2 DIABETES MELLITUS WITHOUT COMPLICATION, WITHOUT LONG-TERM CURRENT USE OF INSULIN (HCC): ICD-10-CM

## 2020-06-29 RX ORDER — METFORMIN HYDROCHLORIDE 500 MG/1
1500 TABLET, EXTENDED RELEASE ORAL
Qty: 270 TAB | Refills: 0 | Status: SHIPPED | OUTPATIENT
Start: 2020-06-29 | End: 2020-09-22 | Stop reason: SDUPTHER

## 2020-06-29 NOTE — TELEPHONE ENCOUNTER
I recently changed prescription orders from [facility] to [facility] and it is working greatly through [facility].  It's time to order another 90 day supply of the Metformin Hcl Er 24 Hr 500 Mg Tab, taking 3 tablets daily with dinner. I will be leaving for vacation in 10 days and will be gone for 2 weeks, not returning until July 26. Please respond as to when this reorder will be placed. My other medications, the Crestor and the Vitamin D subscribed by Dr. Stewart Marino will not need to be reordered until August or September. Thank you for your assistance. LOV 2/18/20, last A1c same day it was 6.7, last filled 10/21/19.   Pt was due OV in May will let pt know that she does need to set up a f/u appt, via [employer]

## 2020-07-29 DIAGNOSIS — E11.9 TYPE 2 DIABETES MELLITUS WITHOUT COMPLICATION, WITHOUT LONG-TERM CURRENT USE OF INSULIN (HCC): ICD-10-CM

## 2020-07-29 RX ORDER — ROSUVASTATIN CALCIUM 5 MG/1
5 TABLET, COATED ORAL
Qty: 90 TAB | Refills: 1 | Status: SHIPPED | OUTPATIENT
Start: 2020-07-29 | End: 2021-01-15 | Stop reason: SDUPTHER

## 2020-07-29 NOTE — TELEPHONE ENCOUNTER
She has a refill of the vit D left the crestor needs a refill and looks like you told her to come in July for labs. Do you want to see her or just order the labs?    ----- Message from Robert Gramajo Marilin Nguyen sent at 7/29/2020  9:31 AM EDT -----  Regarding: Prescription Question  Contact: [phone]  My Vitamin D 50,000 Unit Cap is needing a refill, but change to [id number] at [address], not [facility].    Also need Rosuvastatin Calcium 5 mg tab to be refilled, again at [id number] at [address], not [place].    Yesterday I got my Prevnar 13 PFS (#10) shot from [facility] at [facility]. I had already had the Pneumovax on 6/8/2018. Plan to receive 2020 flu shot in August pharmacist recommended in visit yesterday. Verifying with eye doctor for upcoming office visit on Aug. 14 and to send you updates as well as entire file info. I believe I am due to see you for another checkup visit since my first visit in February, then my virtual visit. Let's get that scheduled.     Thanks, Taj Rae

## 2020-07-31 ENCOUNTER — HOSPITAL ENCOUNTER (OUTPATIENT)
Dept: LAB | Age: 65
Discharge: HOME | End: 2020-07-31
Payer: MEDICARE

## 2020-07-31 ENCOUNTER — OFFICE VISIT (OUTPATIENT)
Dept: FAMILY MEDICINE CLINIC | Age: 65
End: 2020-07-31

## 2020-07-31 VITALS
DIASTOLIC BLOOD PRESSURE: 80 MMHG | WEIGHT: 195.8 LBS | BODY MASS INDEX: 38.44 KG/M2 | HEART RATE: 84 BPM | TEMPERATURE: 98.6 F | RESPIRATION RATE: 16 BRPM | HEIGHT: 60 IN | OXYGEN SATURATION: 97 % | SYSTOLIC BLOOD PRESSURE: 132 MMHG

## 2020-07-31 DIAGNOSIS — E11.9 TYPE 2 DIABETES MELLITUS WITHOUT COMPLICATION, WITHOUT LONG-TERM CURRENT USE OF INSULIN (HCC): ICD-10-CM

## 2020-07-31 DIAGNOSIS — Z13.39 SCREENING FOR ALCOHOLISM: ICD-10-CM

## 2020-07-31 DIAGNOSIS — E78.49 OTHER HYPERLIPIDEMIA: ICD-10-CM

## 2020-07-31 DIAGNOSIS — E55.9 VITAMIN D INSUFFICIENCY: ICD-10-CM

## 2020-07-31 DIAGNOSIS — E66.01 SEVERE OBESITY (HCC): ICD-10-CM

## 2020-07-31 DIAGNOSIS — Z13.31 SCREENING FOR DEPRESSION: ICD-10-CM

## 2020-07-31 DIAGNOSIS — Z85.828 HISTORY OF BASAL CELL CARCINOMA: ICD-10-CM

## 2020-07-31 DIAGNOSIS — E55.9 VITAMIN D DEFICIENCY: ICD-10-CM

## 2020-07-31 DIAGNOSIS — L57.0 ACTINIC KERATOSIS: ICD-10-CM

## 2020-07-31 DIAGNOSIS — Z00.00 WELCOME TO MEDICARE PREVENTIVE VISIT: Primary | ICD-10-CM

## 2020-07-31 PROCEDURE — 84443 ASSAY THYROID STIM HORMONE: CPT

## 2020-07-31 PROCEDURE — 85025 COMPLETE CBC W/AUTO DIFF WBC: CPT

## 2020-07-31 PROCEDURE — 81001 URINALYSIS AUTO W/SCOPE: CPT

## 2020-07-31 PROCEDURE — 80061 LIPID PANEL: CPT

## 2020-07-31 PROCEDURE — 82306 VITAMIN D 25 HYDROXY: CPT

## 2020-07-31 PROCEDURE — 82043 UR ALBUMIN QUANTITATIVE: CPT

## 2020-07-31 PROCEDURE — 83036 HEMOGLOBIN GLYCOSYLATED A1C: CPT

## 2020-07-31 PROCEDURE — 80053 COMPREHEN METABOLIC PANEL: CPT

## 2020-07-31 RX ORDER — ERGOCALCIFEROL 1.25 MG/1
50000 CAPSULE ORAL
Qty: 12 CAP | Refills: 1 | OUTPATIENT
Start: 2020-07-31

## 2020-07-31 NOTE — PROGRESS NOTES
Chief Complaint   Patient presents with    Hypertension    Diabetes    Medication Evaluation     1. Have you been to the ER, urgent care clinic since your last visit? Hospitalized since your last visit? No    2. Have you seen or consulted any other health care providers outside of the [address] [place] [address] since your last visit? Include any pap smears or colon screening.  No     Due AWWV

## 2020-07-31 NOTE — TELEPHONE ENCOUNTER
MD Maldonado,     Patient request for new rx. Changing pharmacy. ThanksBryan    Last Visit: Today- MD Maldonado  Next Appointment: Not scheduled  Previous Refill Encounter(s): 5/11/20  12 + 1 ([facility]) - changing pharmacy    Requested Prescriptions     Pending Prescriptions Disp Refills    ergocalciferol (ERGOCALCIFEROL) 1,250 mcg (50,000 unit) capsule 12 Cap 1     Sig: Take 1 Cap by mouth every seven (7) days.

## 2020-07-31 NOTE — PROGRESS NOTES
This is a \"Welcome to [employer]"  Initial Preventive Physical Examination (IPPE) providing Personalized Prevention Plan Services (Performed in the first 12 months of enrollment)    I have reviewed the patient's medical history in detail and updated the computerized patient record. Interval History  Reports that she has been working on portion control with her eating  She would like referral to another ophthalmologist  She denies any further episodes of dizziness or any other concerning symtpoms  Fasting BG in 110s to 120s, does not check after meals and Last A1c 6.7 in Feb, 2020  She has not been checking home blood pressures  Last saw dermatology in March 2020, and scheduled for follow up in Feb, 2021  Last DEXA scan was April, 2018 with OBGYN  Taking all medicaitons as prescribed      History     Past Medical History:   Diagnosis Date    Diabetes [facility])       Past Surgical History:   Procedure Laterality Date   Elver Benigno [address] HX GYN  2003    hysterectomy     Current Outpatient Medications   Medication Sig Dispense Refill    rosuvastatin (CRESTOR) 5 mg tablet Take 1 Tab by mouth nightly. 90 Tab 1    metFORMIN ER (GLUCOPHAGE XR) 500 mg tablet Take 3 Tabs by mouth daily (with dinner). 270 Tab 0    lancets misc Use to check fasting and meal time blood sugar four times daily. Patient may use whichever product is covered by insurance. 120 Each 2    glucose blood VI test strips (blood glucose test) strip Use to check fasting and meal time blood sugar four times daily. Patient may use whichever product is covered by insurance. 120 Strip 2    Blood-Glucose Meter monitoring kit Use to check fasting and meal time blood sugar daily. Patient may use whichever product is covered by insurance. 1 Kit 0    cholecalciferol (VITAMIN D3) (2,000 UNITS /50 MCG) cap capsule Take 2,000 Units by mouth two (2) times a day.  180 Cap 1     Allergies   Allergen Reactions    Codeine Other (comments)     Past out    Tetanus And Diphther. Tox (Pf) Swelling       Family History   Problem Relation Age of Onset    Gall Bladder Disease Mother     Cancer Father         colon, liver    Stroke Father         TIAs    Parkinson's Disease Father     Migraines Father      Social History     Tobacco Use    Smoking status: Never Smoker    Smokeless tobacco: Never Used   Substance Use Topics    Alcohol use: No       Depression Risk Screen     3 most recent PHQ Screens 7/31/2020   Little interest or pleasure in doing things Not at all   Feeling down, depressed, irritable, or hopeless Not at all   Total Score PHQ 2 0       Alcohol Risk Factor Screening:   Do you average 1 drink per night or more than 7 drinks a week:  No    On any one occasion in the past three months have you have had more than 3 drinks containing alcohol:  No      Functional Ability and Level of Safety   Diet: No special diet     Hearing: Hearing is good. Vision Screening:  Vision is good. No exam data present     Activities of Daily Living: The home contains: good lighting  Patient does total self care     Ambulation: with no difficulty     Exercise level: moderately active     Fall Risk Screen:  Fall Risk Assessment, last 12 mths 7/31/2020   Able to walk? Yes   Fall in past 12 months? Yes   Fall with injury? No   Number of falls in past 12 months 1   Fall Risk Score 1     Abuse Screen:  Patient is not abused       Screening EKG   EKG order placed: No (PATIENT DECLINED)    Patient Care Team   Patient Care Team:  Clyde Curry MD as PCP - General (Family Medicine)  Clyde Curry MD as PCP - REHABILITATION [facility] Empaneled Provider  Betzy Vinson MD (Obstetrics & Gynecology)  Noe Feng MD (Colon and Rectal Surgery)  Sanchez Paulson MD as Physician (Dermatology)     End of Life Planning   Advanced care planning directives were discussed with the patient and /or family/caregiver.      Assessment/Plan   Education and counseling provided:  Are appropriate based on today's review and evaluation      ICD-10-CM ICD-9-CM    1. Welcome to Medicare preventive visit  Z00.00 V70.0    2. Screening for depression  Z13.31 V79.0 DEPRESSION SCREEN ANNUAL   3. Screening for alcoholism  Z13.39 V79.1 MA ANNUAL ALCOHOL SCREEN 15 MIN   4. Vitamin D insufficiency  E55.9 268.9 VITAMIN D, 25 HYDROXY   5. Severe obesity ([address]  E66.01 278.01    6. Other hyperlipidemia  E78.49 272.4    7. History of basal cell carcinoma  Z85.828 V10.83    8. Type 2 diabetes mellitus without complication, without long-term current use of insulin (HCC)  E11.9 250.00  DIABETES FOOT EXAM      METABOLIC PANEL, COMPREHENSIVE      URINALYSIS W/ RFLX MICROSCOPIC      CBC WITH AUTOMATED DIFF      HEMOGLOBIN A1C WITH EAG      MICROALBUMIN, UR, RAND W/ MICROALB/CREAT RATIO      TSH REFLEX TO T4      LIPID PANEL   9. Actinic keratosis  L57.0 702.0      · Medicare Wellness Exam: Reviewed and addressed patient's medical history and concerns as discussed in note. Reviewed recommended screenings and immunizations. Discussed recommendations for diet, exercise, and lifestyle. Patient has DEXA scan with her OBGYN and she will get us copies of this. · All conditions listed above: chronic and stable. Will continue current treatment regimen. Will check labs as reflected above. Discussed following up with specialist as scheduled. Discussed recommendations for diet and exercise.         Health Maintenance Due   Topic Date Due    Foot Exam Q1  06/08/2019    Colonoscopy  11/19/2019    Bone Densitometry (Dexa) Screening  03/07/2020    Influenza Age 5 to Adult  08/01/2020    GLAUCOMA SCREENING Q2Y  08/14/2020    Eye Exam Retinal or Dilated  08/14/2020

## 2020-07-31 NOTE — PATIENT INSTRUCTIONS
Medicare Wellness Visit, Female The best way to live healthy is to have a lifestyle where you eat a well-balanced diet, exercise regularly, limit alcohol use, and quit all forms of tobacco/nicotine, if applicable. Regular preventive services are another way to keep healthy. Preventive services (vaccines, screening tests, monitoring & exams) can help personalize your care plan, which helps you manage your own care. Screening tests can find health problems at the earliest stages, when they are easiest to treat. [facility] follows the current, evidence-based guidelines published by the [facility] Sukh[facility] Calderon ([facility]) when recommending preventive services for our patients. Because we follow these guidelines, sometimes recommendations change over time as research supports it. (For example, mammograms used to be recommended annually. Even though Medicare will still pay for an annual mammogram, the newer guidelines recommend a mammogram every two years for women of average risk). Of course, you and your doctor may decide to screen more often for some diseases, based on your risk and your co-morbidities (chronic disease you are already diagnosed with). Preventive services for you include: - Medicare offers their members a free annual wellness visit, which is time for you and your primary care provider to discuss and plan for your preventive service needs. Take advantage of this benefit every year! 
-All adults over the age of 72 should receive the recommended pneumonia vaccines. Current USPSTF guidelines recommend a series of two vaccines for the best pneumonia protection.  
-All adults should have a flu vaccine yearly and a tetanus vaccine every 10 years.  
-All adults age 48 and older should receive the shingles vaccines (series of two vaccines). -All adults age 38-68 who are overweight should have a diabetes screening test once every three years. -All adults born between [birth year] and 1965 should be screened once for Hepatitis C. 
-Other screening tests and preventive services for persons with diabetes include: an eye exam to screen for diabetic retinopathy, a kidney function test, a foot exam, and stricter control over your cholesterol.  
-Cardiovascular screening for adults with routine risk involves an electrocardiogram (ECG) at intervals determined by your doctor.  
-Colorectal cancer screenings should be done for adults age 54-65 with no increased risk factors for colorectal cancer. There are a number of acceptable methods of screening for this type of cancer. Each test has its own benefits and drawbacks. Discuss with your doctor what is most appropriate for you during your annual wellness visit. The different tests include: colonoscopy (considered the best screening method), a fecal occult blood test, a fecal DNA test, and sigmoidoscopy. 
 
-A bone mass density test is recommended when a woman turns 65 to screen for osteoporosis. This test is only recommended one time, as a screening. Some providers will use this same test as a disease monitoring tool if you already have osteoporosis. -Breast cancer screenings are recommended every other year for women of normal risk, age 54-69. 
-Cervical cancer screenings for women over age 72 are only recommended with certain risk factors. Here is a list of your current Health Maintenance items (your personalized list of preventive services) with a due date: 
Health Maintenance Due Topic Date Due  
 Diabetic Foot Care  06/08/2019  Colonoscopy  11/19/2019  Bone Mineral Density   03/07/2020 [address] [place] [address] Annual Well Visit  04/29/2020  Glaucoma Screening   08/14/2020 [address] [place] [address] Eye Exam  08/14/2020

## 2020-08-01 LAB
25(OH)D3+25(OH)D2 SERPL-MCNC: 31.6 NG/ML (ref 30–100)
ALBUMIN SERPL-MCNC: 4.3 G/DL (ref 3.8–4.8)
ALBUMIN/CREAT UR: 11 MG/G CREAT (ref 0–29)
ALBUMIN/GLOB SERPL: 1.7 {RATIO} (ref 1.2–2.2)
ALP SERPL-CCNC: 84 IU/L (ref 39–117)
ALT SERPL-CCNC: 8 IU/L (ref 0–32)
APPEARANCE UR: CLEAR
AST SERPL-CCNC: 10 IU/L (ref 0–40)
BASOPHILS # BLD AUTO: 0.1 X10E3/UL (ref 0–0.2)
BASOPHILS NFR BLD AUTO: 1 %
BILIRUB SERPL-MCNC: <0.2 MG/DL (ref 0–1.2)
BILIRUB UR QL STRIP: NEGATIVE
BUN SERPL-MCNC: 15 MG/DL (ref 8–27)
BUN/CREAT SERPL: 21 (ref 12–28)
CALCIUM SERPL-MCNC: 9.6 MG/DL (ref 8.7–10.3)
CHLORIDE SERPL-SCNC: 103 MMOL/L (ref 96–106)
CHOLEST SERPL-MCNC: 148 MG/DL (ref 100–199)
CO2 SERPL-SCNC: 22 MMOL/L (ref 20–29)
COLOR UR: YELLOW
CREAT SERPL-MCNC: 0.7 MG/DL (ref 0.57–1)
CREAT UR-MCNC: 167.8 MG/DL
EOSINOPHIL # BLD AUTO: 0.2 X10E3/UL (ref 0–0.4)
EOSINOPHIL NFR BLD AUTO: 2 %
ERYTHROCYTE [DISTWIDTH] IN BLOOD BY AUTOMATED COUNT: 12.6 % (ref 11.7–15.4)
EST. AVERAGE GLUCOSE BLD GHB EST-MCNC: 146 MG/DL
GLOBULIN SER CALC-MCNC: 2.6 G/DL (ref 1.5–4.5)
GLUCOSE SERPL-MCNC: 132 MG/DL (ref 65–99)
GLUCOSE UR QL: NEGATIVE
HBA1C MFR BLD: 6.7 % (ref 4.8–5.6)
HCT VFR BLD AUTO: 40.6 % (ref 34–46.6)
HDLC SERPL-MCNC: 63 MG/DL
HGB BLD-MCNC: 13.2 G/DL (ref 11.1–15.9)
HGB UR QL STRIP: NEGATIVE
IMM GRANULOCYTES # BLD AUTO: 0.1 X10E3/UL (ref 0–0.1)
IMM GRANULOCYTES NFR BLD AUTO: 1 %
INTERPRETATION, 910389: NORMAL
KETONES UR QL STRIP: NEGATIVE
LDLC SERPL CALC-MCNC: 68 MG/DL (ref 0–99)
LEUKOCYTE ESTERASE UR QL STRIP: NEGATIVE
LYMPHOCYTES # BLD AUTO: 2.8 X10E3/UL (ref 0.7–3.1)
LYMPHOCYTES NFR BLD AUTO: 26 %
MCH RBC QN AUTO: 30.3 PG (ref 26.6–33)
MCHC RBC AUTO-ENTMCNC: 32.5 G/DL (ref 31.5–35.7)
MCV RBC AUTO: 93 FL (ref 79–97)
MICRO URNS: NORMAL
MICROALBUMIN UR-MCNC: 18 UG/ML
MONOCYTES # BLD AUTO: 0.6 X10E3/UL (ref 0.1–0.9)
MONOCYTES NFR BLD AUTO: 6 %
NEUTROPHILS # BLD AUTO: 6.9 X10E3/UL (ref 1.4–7)
NEUTROPHILS NFR BLD AUTO: 64 %
NITRITE UR QL STRIP: NEGATIVE
PH UR STRIP: 5 [PH] (ref 5–7.5)
PLATELET # BLD AUTO: 233 X10E3/UL (ref 150–450)
POTASSIUM SERPL-SCNC: 4.3 MMOL/L (ref 3.5–5.2)
PROT SERPL-MCNC: 6.9 G/DL (ref 6–8.5)
PROT UR QL STRIP: NEGATIVE
RBC # BLD AUTO: 4.36 X10E6/UL (ref 3.77–5.28)
SODIUM SERPL-SCNC: 143 MMOL/L (ref 134–144)
SP GR UR: 1.02 (ref 1–1.03)
TRIGL SERPL-MCNC: 85 MG/DL (ref 0–149)
TSH SERPL DL<=0.005 MIU/L-ACNC: 1.08 UIU/ML (ref 0.45–4.5)
UROBILINOGEN UR STRIP-MCNC: 0.2 MG/DL (ref 0.2–1)
VLDLC SERPL CALC-MCNC: 17 MG/DL (ref 5–40)
WBC # BLD AUTO: 10.6 X10E3/UL (ref 3.4–10.8)

## 2020-08-03 DIAGNOSIS — E55.9 VITAMIN D DEFICIENCY: Primary | ICD-10-CM

## 2020-08-03 RX ORDER — ACETAMINOPHEN 500 MG
2000 TABLET ORAL 2 TIMES DAILY
Qty: 180 CAP | Refills: 1 | Status: SHIPPED | OUTPATIENT
Start: 2020-08-03 | End: 2021-08-25

## 2020-08-03 RX ORDER — ERGOCALCIFEROL 1.25 MG/1
50000 CAPSULE ORAL
Qty: 12 CAP | Refills: 1 | Status: CANCELLED | OUTPATIENT
Start: 2020-08-03

## 2020-08-03 NOTE — TELEPHONE ENCOUNTER
Patient request for new rx to [facility].  Danielle, Lesley    Last Visit: 7/31/20  MD Campbell Solitario  Next Appointment: Not scheduled  Previous Refill Encounter(s): 5/11/20  12 + 1 (sent to [facility]    Requested Prescriptions     Pending Prescriptions Disp Refills    ergocalciferol (ERGOCALCIFEROL) 1,250 mcg (50,000 unit) capsule 12 Cap 1     Sig: Take 1 Cap by mouth every seven (7) days.

## 2020-09-22 DIAGNOSIS — E11.9 TYPE 2 DIABETES MELLITUS WITHOUT COMPLICATION, WITHOUT LONG-TERM CURRENT USE OF INSULIN (HCC): ICD-10-CM

## 2020-09-22 PROBLEM — E11.3313 MODERATE NONPROLIFERATIVE DIABETIC RETINOPATHY OF BOTH EYES WITH MACULAR EDEMA ASSOCIATED WITH TYPE 2 DIABETES MELLITUS (HCC): Status: ACTIVE | Noted: 2020-09-22

## 2020-09-22 PROBLEM — H25.13 AGE-RELATED NUCLEAR CATARACT OF BOTH EYES: Chronic | Status: ACTIVE | Noted: 2020-09-22

## 2020-09-22 RX ORDER — METFORMIN HYDROCHLORIDE 500 MG/1
1500 TABLET, EXTENDED RELEASE ORAL
Qty: 270 TAB | Refills: 0 | Status: SHIPPED | OUTPATIENT
Start: 2020-09-22 | End: 2020-12-15 | Stop reason: SDUPTHER

## 2020-09-22 NOTE — TELEPHONE ENCOUNTER
Last Visit: 7/31/20  MD Veena Olivo  Next Appointment: Not scheduled  Previous Refill Encounter(s): 6/29/20  270    Requested Prescriptions     Pending Prescriptions Disp Refills    metFORMIN ER (GLUCOPHAGE XR) 500 mg tablet 270 Tab 0     Sig: Take 3 Tabs by mouth daily (with dinner).

## 2020-12-15 DIAGNOSIS — E11.9 TYPE 2 DIABETES MELLITUS WITHOUT COMPLICATION, WITHOUT LONG-TERM CURRENT USE OF INSULIN (HCC): ICD-10-CM

## 2020-12-15 RX ORDER — METFORMIN HYDROCHLORIDE 500 MG/1
1500 TABLET, EXTENDED RELEASE ORAL
Qty: 270 TAB | Refills: 0 | Status: SHIPPED | OUTPATIENT
Start: 2020-12-15 | End: 2021-03-10 | Stop reason: SDUPTHER

## 2020-12-15 NOTE — TELEPHONE ENCOUNTER
Last Visit: 7/31/20 MD Jerri Zhao  Next Appointment: Not scheduled- 3 month past due 10/20  Previous Refill Encounter(s): 9/22/20  270    Requested Prescriptions     Pending Prescriptions Disp Refills    metFORMIN ER (GLUCOPHAGE XR) 500 mg tablet 270 Tab 0     Sig: Take 3 Tabs by mouth daily (with dinner).

## 2021-01-07 DIAGNOSIS — E11.9 TYPE 2 DIABETES MELLITUS WITHOUT COMPLICATION, WITHOUT LONG-TERM CURRENT USE OF INSULIN (HCC): ICD-10-CM

## 2021-01-07 RX ORDER — IBUPROFEN 200 MG
CAPSULE ORAL
Qty: 100 STRIP | Refills: 3 | Status: SHIPPED | OUTPATIENT
Start: 2021-01-07 | End: 2022-01-27 | Stop reason: SDUPTHER

## 2021-01-07 RX ORDER — IBUPROFEN 200 MG
CAPSULE ORAL
Qty: 120 STRIP | Refills: 2 | Status: CANCELLED | OUTPATIENT
Start: 2021-01-07

## 2021-01-07 RX ORDER — LANCETS
EACH MISCELLANEOUS
Qty: 100 EACH | Refills: 3 | Status: SHIPPED | OUTPATIENT
Start: 2021-01-07 | End: 2021-09-22 | Stop reason: SDUPTHER

## 2021-01-09 ENCOUNTER — HOSPITAL ENCOUNTER (EMERGENCY)
Age: 66
Discharge: HOME | End: 2021-01-09
Attending: EMERGENCY MEDICINE
Payer: MEDICARE

## 2021-01-09 ENCOUNTER — APPOINTMENT (OUTPATIENT)
Dept: CT IMAGING | Age: 66
End: 2021-01-09
Attending: EMERGENCY MEDICINE
Payer: MEDICARE

## 2021-01-09 VITALS
RESPIRATION RATE: 18 BRPM | DIASTOLIC BLOOD PRESSURE: 79 MMHG | SYSTOLIC BLOOD PRESSURE: 162 MMHG | OXYGEN SATURATION: 98 % | TEMPERATURE: 96.9 F | HEART RATE: 80 BPM

## 2021-01-09 DIAGNOSIS — N20.0 KIDNEY STONE: Primary | ICD-10-CM

## 2021-01-09 DIAGNOSIS — R11.2 NAUSEA AND VOMITING, INTRACTABILITY OF VOMITING NOT SPECIFIED, UNSPECIFIED VOMITING TYPE: ICD-10-CM

## 2021-01-09 LAB
ALBUMIN SERPL-MCNC: 4 G/DL (ref 3.5–5)
ALBUMIN/GLOB SERPL: 1 {RATIO} (ref 1.1–2.2)
ALP SERPL-CCNC: 100 U/L (ref 45–117)
ALT SERPL-CCNC: 16 U/L (ref 12–78)
ANION GAP SERPL CALC-SCNC: 6 MMOL/L (ref 5–15)
APPEARANCE UR: CLEAR
AST SERPL-CCNC: 10 U/L (ref 15–37)
BACTERIA URNS QL MICRO: NEGATIVE /HPF
BASOPHILS # BLD: 0.1 K/UL (ref 0–0.1)
BASOPHILS NFR BLD: 1 % (ref 0–1)
BILIRUB SERPL-MCNC: 0.3 MG/DL (ref 0.2–1)
BILIRUB UR QL: NEGATIVE
BUN SERPL-MCNC: 20 MG/DL (ref 6–20)
BUN/CREAT SERPL: 23 (ref 12–20)
CALCIUM SERPL-MCNC: 10 MG/DL (ref 8.5–10.1)
CHLORIDE SERPL-SCNC: 103 MMOL/L (ref 97–108)
CO2 SERPL-SCNC: 28 MMOL/L (ref 21–32)
COLOR UR: ABNORMAL
COMMENT, HOLDF: NORMAL
CREAT SERPL-MCNC: 0.87 MG/DL (ref 0.55–1.02)
DIFFERENTIAL METHOD BLD: ABNORMAL
EOSINOPHIL # BLD: 0.1 K/UL (ref 0–0.4)
EOSINOPHIL NFR BLD: 1 % (ref 0–7)
EPITH CASTS URNS QL MICRO: ABNORMAL /LPF
ERYTHROCYTE [DISTWIDTH] IN BLOOD BY AUTOMATED COUNT: 12.5 % (ref 11.5–14.5)
GLOBULIN SER CALC-MCNC: 4.1 G/DL (ref 2–4)
GLUCOSE BLD STRIP.AUTO-MCNC: 173 MG/DL (ref 65–100)
GLUCOSE SERPL-MCNC: 182 MG/DL (ref 65–100)
GLUCOSE UR STRIP.AUTO-MCNC: 100 MG/DL
HCT VFR BLD AUTO: 43.7 % (ref 35–47)
HGB BLD-MCNC: 14.1 G/DL (ref 11.5–16)
HGB UR QL STRIP: ABNORMAL
HYALINE CASTS URNS QL MICRO: ABNORMAL /LPF (ref 0–5)
IMM GRANULOCYTES # BLD AUTO: 0.1 K/UL (ref 0–0.04)
IMM GRANULOCYTES NFR BLD AUTO: 1 % (ref 0–0.5)
KETONES UR QL STRIP.AUTO: 80 MG/DL
LEUKOCYTE ESTERASE UR QL STRIP.AUTO: NEGATIVE
LIPASE SERPL-CCNC: 212 U/L (ref 73–393)
LYMPHOCYTES # BLD: 2.2 K/UL (ref 0.8–3.5)
LYMPHOCYTES NFR BLD: 15 % (ref 12–49)
MCH RBC QN AUTO: 30.3 PG (ref 26–34)
MCHC RBC AUTO-ENTMCNC: 32.3 G/DL (ref 30–36.5)
MCV RBC AUTO: 94 FL (ref 80–99)
MONOCYTES # BLD: 0.5 K/UL (ref 0–1)
MONOCYTES NFR BLD: 4 % (ref 5–13)
NEUTS SEG # BLD: 11.1 K/UL (ref 1.8–8)
NEUTS SEG NFR BLD: 78 % (ref 32–75)
NITRITE UR QL STRIP.AUTO: NEGATIVE
NRBC # BLD: 0 K/UL (ref 0–0.01)
NRBC BLD-RTO: 0 PER 100 WBC
PH UR STRIP: 5 [PH] (ref 5–8)
PLATELET # BLD AUTO: 266 K/UL (ref 150–400)
PMV BLD AUTO: 11.4 FL (ref 8.9–12.9)
POTASSIUM SERPL-SCNC: 3.9 MMOL/L (ref 3.5–5.1)
PROT SERPL-MCNC: 8.1 G/DL (ref 6.4–8.2)
PROT UR STRIP-MCNC: NEGATIVE MG/DL
RBC # BLD AUTO: 4.65 M/UL (ref 3.8–5.2)
RBC #/AREA URNS HPF: ABNORMAL /HPF (ref 0–5)
SAMPLES BEING HELD,HOLD: NORMAL
SERVICE CMNT-IMP: ABNORMAL
SODIUM SERPL-SCNC: 137 MMOL/L (ref 136–145)
SP GR UR REFRACTOMETRY: 1.03 (ref 1–1.03)
UR CULT HOLD, URHOLD: NORMAL
UROBILINOGEN UR QL STRIP.AUTO: 0.2 EU/DL (ref 0.2–1)
WBC # BLD AUTO: 14.1 K/UL (ref 3.6–11)
WBC URNS QL MICRO: ABNORMAL /HPF (ref 0–4)

## 2021-01-09 PROCEDURE — 85025 COMPLETE CBC W/AUTO DIFF WBC: CPT

## 2021-01-09 PROCEDURE — 96374 THER/PROPH/DIAG INJ IV PUSH: CPT

## 2021-01-09 PROCEDURE — 99284 EMERGENCY DEPT VISIT MOD MDM: CPT

## 2021-01-09 PROCEDURE — 83690 ASSAY OF LIPASE: CPT

## 2021-01-09 PROCEDURE — 74176 CT ABD & PELVIS W/O CONTRAST: CPT

## 2021-01-09 PROCEDURE — 81001 URINALYSIS AUTO W/SCOPE: CPT

## 2021-01-09 PROCEDURE — 80053 COMPREHEN METABOLIC PANEL: CPT

## 2021-01-09 PROCEDURE — 74011250636 HC RX REV CODE- 250/636: Performed by: EMERGENCY MEDICINE

## 2021-01-09 PROCEDURE — 96375 TX/PRO/DX INJ NEW DRUG ADDON: CPT

## 2021-01-09 PROCEDURE — 82962 GLUCOSE BLOOD TEST: CPT

## 2021-01-09 PROCEDURE — 74011250637 HC RX REV CODE- 250/637: Performed by: EMERGENCY MEDICINE

## 2021-01-09 RX ORDER — TAMSULOSIN HYDROCHLORIDE 0.4 MG/1
0.4 CAPSULE ORAL
Status: COMPLETED | OUTPATIENT
Start: 2021-01-09 | End: 2021-01-09

## 2021-01-09 RX ORDER — HYDROCODONE BITARTRATE AND ACETAMINOPHEN 5; 325 MG/1; MG/1
1 TABLET ORAL
Qty: 10 TAB | Refills: 0 | Status: SHIPPED | OUTPATIENT
Start: 2021-01-09 | End: 2021-01-12

## 2021-01-09 RX ORDER — TAMSULOSIN HYDROCHLORIDE 0.4 MG/1
0.4 CAPSULE ORAL DAILY
Qty: 15 CAP | Refills: 0 | Status: SHIPPED | OUTPATIENT
Start: 2021-01-09 | End: 2021-01-24

## 2021-01-09 RX ORDER — IBUPROFEN 400 MG/1
400 TABLET ORAL
Qty: 20 TAB | Refills: 0 | Status: SHIPPED | OUTPATIENT
Start: 2021-01-09 | End: 2021-08-25

## 2021-01-09 RX ORDER — KETOROLAC TROMETHAMINE 30 MG/ML
15 INJECTION, SOLUTION INTRAMUSCULAR; INTRAVENOUS
Status: COMPLETED | OUTPATIENT
Start: 2021-01-09 | End: 2021-01-09

## 2021-01-09 RX ORDER — ONDANSETRON 2 MG/ML
4 INJECTION INTRAMUSCULAR; INTRAVENOUS
Status: COMPLETED | OUTPATIENT
Start: 2021-01-09 | End: 2021-01-09

## 2021-01-09 RX ORDER — ONDANSETRON 4 MG/1
4 TABLET, ORALLY DISINTEGRATING ORAL
Qty: 20 TAB | Refills: 0 | Status: SHIPPED | OUTPATIENT
Start: 2021-01-09 | End: 2021-08-25

## 2021-01-09 RX ADMIN — TAMSULOSIN HYDROCHLORIDE 0.4 MG: 0.4 CAPSULE ORAL at 07:10

## 2021-01-09 RX ADMIN — SODIUM CHLORIDE 1000 ML: 9 INJECTION, SOLUTION INTRAVENOUS at 04:54

## 2021-01-09 RX ADMIN — ONDANSETRON 4 MG: 2 INJECTION INTRAMUSCULAR; INTRAVENOUS at 04:53

## 2021-01-09 RX ADMIN — KETOROLAC TROMETHAMINE 15 MG: 30 INJECTION, SOLUTION INTRAMUSCULAR at 04:54

## 2021-01-09 NOTE — ED TRIAGE NOTES
Pt reports she suddenly started having LLQ abdominal pain around 2200 PTA. Pt reports she vomited 3 times PTA. Pt reports she ate a vinnys salad and pizza for dinner.        Pt denies diarrhea

## 2021-01-09 NOTE — Clinical Note
[address] 55 
[address] [facility] [address] [id number] 
[phone] Work/School Note Date: 1/9/2021 To Whom It May concern: 
 
Farhana Barahona was seen and treated today in the emergency room by the following provider(s): 
Attending Provider: Guerline Muniz MD.   
 
Farhana Barahona is excused from work/school on 1/9/2021 through 1/12/2021. She is medically clear to return to work/school on 1/13/2021. Sincerely, Ngozi Aleman MD

## 2021-01-09 NOTE — ED NOTES
Verbal shift change report given to Devora Harrell, RN and Fernando Hurd RN (oncoming nurse) by Rochelle Johnson RN (offgoing nurse). Report included the following information SBAR, Kardex, ED Summary, [facility] and Recent Results.

## 2021-01-15 DIAGNOSIS — E11.9 TYPE 2 DIABETES MELLITUS WITHOUT COMPLICATION, WITHOUT LONG-TERM CURRENT USE OF INSULIN (HCC): ICD-10-CM

## 2021-01-15 RX ORDER — ROSUVASTATIN CALCIUM 5 MG/1
5 TABLET, COATED ORAL
Qty: 90 TAB | Refills: 1 | Status: SHIPPED | OUTPATIENT
Start: 2021-01-15 | End: 2021-07-06 | Stop reason: SDUPTHER

## 2021-01-15 NOTE — TELEPHONE ENCOUNTER
Last Visit: 7/31/20 MD Hayley Joseph, labs done 1/2021  Next Appointment: Not scheduled  Previous Refill Encounter(s): 7/29/20 90 + 1    Requested Prescriptions     Pending Prescriptions Disp Refills    rosuvastatin (CRESTOR) 5 mg tablet 90 Tab 1     Sig: Take 1 Tab by mouth nightly.

## 2021-03-10 DIAGNOSIS — E11.9 TYPE 2 DIABETES MELLITUS WITHOUT COMPLICATION, WITHOUT LONG-TERM CURRENT USE OF INSULIN (HCC): ICD-10-CM

## 2021-03-10 RX ORDER — METFORMIN HYDROCHLORIDE 500 MG/1
1500 TABLET, EXTENDED RELEASE ORAL
Qty: 270 TAB | Refills: 0 | Status: SHIPPED | OUTPATIENT
Start: 2021-03-10 | End: 2021-06-28 | Stop reason: SDUPTHER

## 2021-03-10 NOTE — TELEPHONE ENCOUNTER
Last Visit: 7/31/20 MD Lorne Roberts  Next Appointment: Not scheduled  Previous refill encounter(s)   12/15/2020 Glucophage-XR #270    Requested Prescriptions     Pending Prescriptions Disp Refills    metFORMIN ER (GLUCOPHAGE XR) 500 mg tablet 270 Tab 0     Sig: Take 3 Tabs by mouth daily (with dinner).

## 2021-04-22 ENCOUNTER — VIRTUAL VISIT (OUTPATIENT)
Dept: FAMILY MEDICINE CLINIC | Age: 66
End: 2021-04-22
Payer: MEDICARE

## 2021-04-22 ENCOUNTER — TELEPHONE (OUTPATIENT)
Dept: FAMILY MEDICINE CLINIC | Age: 66
End: 2021-04-22

## 2021-04-22 DIAGNOSIS — E55.9 VITAMIN D DEFICIENCY: ICD-10-CM

## 2021-04-22 DIAGNOSIS — E11.3313 MODERATE NONPROLIFERATIVE DIABETIC RETINOPATHY OF BOTH EYES WITH MACULAR EDEMA ASSOCIATED WITH TYPE 2 DIABETES MELLITUS (HCC): ICD-10-CM

## 2021-04-22 DIAGNOSIS — Z87.442 HISTORY OF KIDNEY STONES: ICD-10-CM

## 2021-04-22 DIAGNOSIS — E11.9 TYPE 2 DIABETES MELLITUS WITHOUT COMPLICATION, WITHOUT LONG-TERM CURRENT USE OF INSULIN (HCC): ICD-10-CM

## 2021-04-22 DIAGNOSIS — E66.01 SEVERE OBESITY (HCC): ICD-10-CM

## 2021-04-22 DIAGNOSIS — R10.11 RUQ PAIN: Primary | ICD-10-CM

## 2021-04-22 PROCEDURE — G0463 HOSPITAL OUTPT CLINIC VISIT: HCPCS | Performed by: FAMILY MEDICINE

## 2021-04-22 PROCEDURE — G8432 DEP SCR NOT DOC, RNG: HCPCS | Performed by: FAMILY MEDICINE

## 2021-04-22 PROCEDURE — G8399 PT W/DXA RESULTS DOCUMENT: HCPCS | Performed by: FAMILY MEDICINE

## 2021-04-22 PROCEDURE — 1100F PTFALLS ASSESS-DOCD GE2>/YR: CPT | Performed by: FAMILY MEDICINE

## 2021-04-22 PROCEDURE — 1090F PRES/ABSN URINE INCON ASSESS: CPT | Performed by: FAMILY MEDICINE

## 2021-04-22 PROCEDURE — 3288F FALL RISK ASSESSMENT DOCD: CPT | Performed by: FAMILY MEDICINE

## 2021-04-22 PROCEDURE — G8417 CALC BMI ABV UP PARAM F/U: HCPCS | Performed by: FAMILY MEDICINE

## 2021-04-22 PROCEDURE — 2022F DILAT RTA XM EVC RTNOPTHY: CPT | Performed by: FAMILY MEDICINE

## 2021-04-22 PROCEDURE — G8536 NO DOC ELDER MAL SCRN: HCPCS | Performed by: FAMILY MEDICINE

## 2021-04-22 PROCEDURE — 99214 OFFICE O/P EST MOD 30 MIN: CPT | Performed by: FAMILY MEDICINE

## 2021-04-22 PROCEDURE — 3046F HEMOGLOBIN A1C LEVEL >9.0%: CPT | Performed by: FAMILY MEDICINE

## 2021-04-22 PROCEDURE — 3017F COLORECTAL CA SCREEN DOC REV: CPT | Performed by: FAMILY MEDICINE

## 2021-04-22 PROCEDURE — G8427 DOCREV CUR MEDS BY ELIG CLIN: HCPCS | Performed by: FAMILY MEDICINE

## 2021-04-22 NOTE — TELEPHONE ENCOUNTER
Pt is scheduled for lab next week. Let her know that she will p/u the stool kit that she needs and they will go over the instructs at that time.

## 2021-04-22 NOTE — PROGRESS NOTES
Lorrie Ortiz  77 y.o. female  1955  Keerthi Barlow  [id number]     [facility]       Encounter Date: 4/22/2021           Established Patient Visit Note: Venita Fine MD    Reason for Appointment:  Chief Complaint   Patient presents with    Abdominal Pain       History of Present Illness:  History provided by patient    Lorrie Ortiz is a 77 y.o. female who presents today for:    She has had COVID vaccines performed on 3/6/21 and 3/27/21    Kidney Stone: She reports having her first kidney stone in January, 2021. She went to ER where she had 3mm obstructing stone at the left UVJ. She was discharged with flomax, norco, zofran, and ibuprofen. She reports that she did not take the norco. She reports that after taking the flomax, she had an episode of presyncope. Abd Pain  Duration: since Jan, 2021  She describes as a soreness/tenderness over the RUQ of her abdomen  She reports that it fluctuates in severity  Obesity: She reports that the pain is not affected by eating. She has not weighed her self recently, but she reports that she is fitting in her clothing as normal.     Vit D Deficiency: taking 2000 of Vit D once a week    DM2: She reports that her average blood sugar has been 129, she is taking 1500mg of metformin  She is trying to eat healthy and exercise. Last A1c 6.7 in July, 2020    Diabetic retinopathy: no longer with Dr. Liseth Orr. She is now followed by Jose Ruiz at [facility]. She reports that it had improved at last visit. HM  She reports last colonoscopy in 2018 or 2019 with five year follow up recommended (Dr. Milton Said at [facility])    Review of Systems  Review of Systems   Constitutional: Negative for chills and fever. Respiratory: Negative for cough, shortness of breath and wheezing. Cardiovascular: Negative for chest pain and palpitations. Allergies: Codeine, Flomax [tamsulosin], and Tetanus and diphther.  tox (pf)    Medications: (Updated to reflect final medication list after visit)    Current Outpatient Medications:     metFORMIN ER (GLUCOPHAGE XR) 500 mg tablet, Take 3 Tabs by mouth daily (with dinner). , Disp: 270 Tab, Rfl: 0    rosuvastatin (CRESTOR) 5 mg tablet, Take 1 Tab by mouth nightly., Disp: 90 Tab, Rfl: 1    ibuprofen (MOTRIN) 400 mg tablet, Take 1 Tab by mouth every eight (8) hours as needed for Pain., Disp: 20 Tab, Rfl: 0    ondansetron (Zofran ODT) 4 mg disintegrating tablet, 1 Tab by SubLINGual route every eight (8) hours as needed for Nausea or Vomiting., Disp: 20 Tab, Rfl: 0    lancets misc, Per insurance preference, test 1x/d dx diabetes E11.9, Disp: 100 Each, Rfl: 3    glucose blood VI test strips (blood glucose test) strip, Per insurance preference, test 1x/d dx diabetes E11.9, Disp: 100 Strip, Rfl: 3    cholecalciferol (VITAMIN D3) (2,000 UNITS /50 MCG) cap capsule, Take 2,000 Units by mouth two (2) times a day., Disp: 180 Cap, Rfl: 1    Blood-Glucose Meter monitoring kit, Use to check fasting and meal time blood sugar daily. Patient may use whichever product is covered by insurance., Disp: 1 Kit, Rfl: 0    History  Patient Care Team:  Artur Snowden MD as PCP - General (Family Medicine)  Artur Snowden MD as PCP - [facility]  Tyson Reyes MD (Obstetrics & Gynecology)  Arline Stone MD (Colon and Rectal Surgery)  Jori Lester MD as Physician (Dermatology)    Past Medical History: she has a past medical history of Diabetes ([facility] [facility] Past Surgical History: she has a past surgical history that includes pr breast surgery procedure unlisted (1999); hx gyn (2003); and hx cyst removal (1980). Family Medical History: family history includes Cancer in her father; Ildefonso Willy in her mother; Migraines in her father; Parkinson's Disease in her father; Stroke in her father. Social History: she reports that she has never smoked.  She has never used smokeless tobacco. She reports that she does not drink alcohol or use drugs. Objective:     Physical Exam    Constitutional:       General: Not in acute distress. Appearance: Normal appearance. Not ill-appearing,  Not toxic-appearing. HENT:      Head: Normocephalic. Right Ear: External ear normal.      Left Ear: External ear normal.      Nose: Nose normal.   Eyes:      General: No scleral icterus. Right eye: No discharge. Left eye: No discharge. Extraocular Movements: Extraocular movements intact. Conjunctiva/sclera: Conjunctivae normal.   Neck:      Musculoskeletal: Normal range of motion. Pulmonary:      Effort: Pulmonary effort is normal. No respiratory distress. Neurological:      Mental Status: Mental status is at baseline. Psychiatric:         Mood and Affect: Mood normal.         Behavior: Behavior normal.         Thought Content: Thought content normal.         Judgment: Judgment normal.       Assessment & Plan:      ICD-10-CM ICD-9-CM    1. RUQ pain  Q65.75 789.46 METABOLIC PANEL, COMPREHENSIVE      H PYLORI AG, STOOL      OCCULT BLOOD, STOOL      US ABD COMP      LIPASE   2. Type 2 diabetes mellitus without complication, without long-term current use of insulin ([facility])  E11.9 250.00 CBC W/O DIFF      LIPID PANEL      METABOLIC PANEL, COMPREHENSIVE      HEMOGLOBIN A1C WITH EAG      URINALYSIS W/ RFLX MICROSCOPIC      TSH 3RD GENERATION      URINALYSIS W/ RFLX MICROSCOPIC      MICROALBUMIN, UR, RAND W/ MICROALB/CREAT RATIO   3. Moderate nonproliferative diabetic retinopathy of both eyes with macular edema associated with type 2 diabetes mellitus ([facility]  E11.3313 250.50      362.07      362.05    4. Vitamin D deficiency  E55.9 268.9 VITAMIN D, 25 HYDROXY   5. Severe obesity ([facility]  E66.01 278.01    6. History of kidney stones  Z87.442 V13.01      · RUQ Abd Pain: New Problem, unclear etiology. Evaluate further with labs and imaging as above. · DM2: Chronic, unclear control. Evaluate further with labs as above. · Diabetic Retinopathy: chronic, stable. Managed by specialist  · Vit D Def: chronic, unclear control. Check Vit D  · Obesity: I have reviewed/discussed the above normal BMI with the patient. I have recommended the following interventions: dietary management education, guidance, and counseling, encourage exercise, monitor weight and prescribed dietary intake. · History of Kidney Stones: Chronic, stable. Continue to monitor symptoms      I was in the office while conducting this encounter. Consent:  She and/or her healthcare decision maker is aware that this patient-initiated Telehealth encounter is a billable service, with coverage as determined by her insurance carrier. She is aware that she may receive a bill and has provided verbal consent to proceed: Yes    This virtual visit was conducted via [employer]. Pursuant to the emergency declaration under the [facility], [facility]5 waiver authority and the [employer] Act, this Virtual  Visit was conducted to reduce the patient's risk of exposure to COVID-19 and provide continuity of care for an established patient. Services were provided through a video synchronous discussion virtually to substitute for in-person clinic visit. Due to this being a TeleHealth evaluation, many elements of the physical examination are unable to be assessed. Total Time: minutes: 30-39. I have discussed the diagnosis with the patient and the intended plan as seen in the above orders. The patient has received an after-visit summary along with patient information handout. I have discussed medication side effects and warnings with the patient as well. Disposition  Follow-up and Dispositions    · Return in about 2 weeks (around 5/6/2021) for office visit for abdominal pain.            Cecilia Drew MD

## 2021-04-22 NOTE — TELEPHONE ENCOUNTER
Patient had an appointment with Bibi Moraes 04/22/2021 and was inquiring the instructions on collecting her stool sample and whether she has to come into the office for the hemoccult kit.       [facility]#[phone]

## 2021-04-27 ENCOUNTER — TELEPHONE (OUTPATIENT)
Dept: FAMILY MEDICINE CLINIC | Age: 66
End: 2021-04-27

## 2021-04-27 ENCOUNTER — HOSPITAL ENCOUNTER (OUTPATIENT)
Dept: ULTRASOUND IMAGING | Age: 66
Discharge: HOME | End: 2021-04-27
Attending: FAMILY MEDICINE
Payer: MEDICARE

## 2021-04-27 DIAGNOSIS — R10.11 RUQ PAIN: ICD-10-CM

## 2021-04-27 DIAGNOSIS — K80.20 SYMPTOMATIC CHOLELITHIASIS: Primary | ICD-10-CM

## 2021-04-27 PROCEDURE — 76700 US EXAM ABDOM COMPLETE: CPT

## 2021-04-27 NOTE — PROGRESS NOTES
Attempted to call patient to discuss imaging results. No answer. Please let patient know that her ultrasound showed gallstones. Please let patient know that I would like for her to to follow up with surgery for further evaluation. An order for this has been placed. Please provide patient with referral information to schedule appointment.

## 2021-04-29 ENCOUNTER — LAB ONLY (OUTPATIENT)
Dept: FAMILY MEDICINE CLINIC | Age: 66
End: 2021-04-29

## 2021-04-29 ENCOUNTER — HOSPITAL ENCOUNTER (OUTPATIENT)
Dept: LAB | Age: 66
Discharge: HOME | End: 2021-04-29
Payer: MEDICARE

## 2021-04-29 DIAGNOSIS — E11.9 TYPE 2 DIABETES MELLITUS WITHOUT COMPLICATION, WITHOUT LONG-TERM CURRENT USE OF INSULIN (HCC): ICD-10-CM

## 2021-04-29 DIAGNOSIS — E55.9 VITAMIN D DEFICIENCY: ICD-10-CM

## 2021-04-29 DIAGNOSIS — R10.11 RUQ PAIN: ICD-10-CM

## 2021-04-29 LAB
25(OH)D3 SERPL-MCNC: 21.7 NG/ML (ref 30–100)
ALBUMIN SERPL-MCNC: 3.7 G/DL (ref 3.5–5)
ALBUMIN/GLOB SERPL: 1.2 {RATIO} (ref 1.1–2.2)
ALP SERPL-CCNC: 89 U/L (ref 45–117)
ALT SERPL-CCNC: 11 U/L (ref 12–78)
ANION GAP SERPL CALC-SCNC: 9 MMOL/L (ref 5–15)
APPEARANCE UR: CLEAR
AST SERPL-CCNC: 8 U/L (ref 15–37)
BILIRUB SERPL-MCNC: 0.4 MG/DL (ref 0.2–1)
BILIRUB UR QL: NEGATIVE
BUN SERPL-MCNC: 14 MG/DL (ref 6–20)
BUN/CREAT SERPL: 22 (ref 12–20)
CALCIUM SERPL-MCNC: 9 MG/DL (ref 8.5–10.1)
CHLORIDE SERPL-SCNC: 106 MMOL/L (ref 97–108)
CHOLEST SERPL-MCNC: 172 MG/DL
CO2 SERPL-SCNC: 25 MMOL/L (ref 21–32)
COLOR UR: NORMAL
CREAT SERPL-MCNC: 0.63 MG/DL (ref 0.55–1.02)
CREAT UR-MCNC: 89.7 MG/DL
ERYTHROCYTE [DISTWIDTH] IN BLOOD BY AUTOMATED COUNT: 12.8 % (ref 11.5–14.5)
EST. AVERAGE GLUCOSE BLD GHB EST-MCNC: 140 MG/DL
GLOBULIN SER CALC-MCNC: 3.2 G/DL (ref 2–4)
GLUCOSE SERPL-MCNC: 114 MG/DL (ref 65–100)
GLUCOSE UR STRIP.AUTO-MCNC: NEGATIVE MG/DL
HBA1C MFR BLD: 6.5 % (ref 4–5.6)
HCT VFR BLD AUTO: 41.4 % (ref 35–47)
HDLC SERPL-MCNC: 72 MG/DL
HDLC SERPL: 2.4 {RATIO} (ref 0–5)
HGB BLD-MCNC: 12.9 G/DL (ref 11.5–16)
HGB UR QL STRIP: NEGATIVE
KETONES UR QL STRIP.AUTO: NEGATIVE MG/DL
LDLC SERPL CALC-MCNC: 81.8 MG/DL (ref 0–100)
LEUKOCYTE ESTERASE UR QL STRIP.AUTO: NEGATIVE
LIPASE SERPL-CCNC: 370 U/L (ref 73–393)
LIPID PROFILE,FLP: NORMAL
MCH RBC QN AUTO: 28.7 PG (ref 26–34)
MCHC RBC AUTO-ENTMCNC: 31.2 G/DL (ref 30–36.5)
MCV RBC AUTO: 92 FL (ref 80–99)
MICROALBUMIN UR-MCNC: 1.37 MG/DL
MICROALBUMIN/CREAT UR-RTO: 15 MG/G (ref 0–30)
NITRITE UR QL STRIP.AUTO: NEGATIVE
NRBC # BLD: 0 K/UL (ref 0–0.01)
NRBC BLD-RTO: 0 PER 100 WBC
PH UR STRIP: 5 [PH] (ref 5–8)
PLATELET # BLD AUTO: 198 K/UL (ref 150–400)
PMV BLD AUTO: 11 FL (ref 8.9–12.9)
POTASSIUM SERPL-SCNC: 3.9 MMOL/L (ref 3.5–5.1)
PROT SERPL-MCNC: 6.9 G/DL (ref 6.4–8.2)
PROT UR STRIP-MCNC: NEGATIVE MG/DL
RBC # BLD AUTO: 4.5 M/UL (ref 3.8–5.2)
SODIUM SERPL-SCNC: 140 MMOL/L (ref 136–145)
SP GR UR REFRACTOMETRY: 1.02 (ref 1–1.03)
TRIGL SERPL-MCNC: 91 MG/DL (ref ?–150)
TSH SERPL DL<=0.05 MIU/L-ACNC: 1.09 UIU/ML (ref 0.36–3.74)
UROBILINOGEN UR QL STRIP.AUTO: 0.2 EU/DL (ref 0.2–1)
VLDLC SERPL CALC-MCNC: 18.2 MG/DL
WBC # BLD AUTO: 6.9 K/UL (ref 3.6–11)

## 2021-04-29 PROCEDURE — 82270 OCCULT BLOOD FECES: CPT

## 2021-05-02 LAB
H PYLORI AG STL QL IA: NEGATIVE
SPECIMEN SOURCE: NORMAL

## 2021-05-04 LAB — HEMOCCULT STL QL IA: NEGATIVE

## 2021-05-12 ENCOUNTER — OFFICE VISIT (OUTPATIENT)
Dept: SURGERY | Age: 66
End: 2021-05-12
Payer: MEDICARE

## 2021-05-12 VITALS
BODY MASS INDEX: 34.93 KG/M2 | SYSTOLIC BLOOD PRESSURE: 152 MMHG | WEIGHT: 189.8 LBS | RESPIRATION RATE: 18 BRPM | HEIGHT: 62 IN | HEART RATE: 71 BPM | OXYGEN SATURATION: 96 % | DIASTOLIC BLOOD PRESSURE: 85 MMHG | TEMPERATURE: 98.2 F

## 2021-05-12 DIAGNOSIS — K80.20 SYMPTOMATIC CHOLELITHIASIS: Primary | ICD-10-CM

## 2021-05-12 PROCEDURE — G8427 DOCREV CUR MEDS BY ELIG CLIN: HCPCS | Performed by: SURGERY

## 2021-05-12 PROCEDURE — 99203 OFFICE O/P NEW LOW 30 MIN: CPT | Performed by: SURGERY

## 2021-05-12 PROCEDURE — G8399 PT W/DXA RESULTS DOCUMENT: HCPCS | Performed by: SURGERY

## 2021-05-12 PROCEDURE — G8510 SCR DEP NEG, NO PLAN REQD: HCPCS | Performed by: SURGERY

## 2021-05-12 PROCEDURE — G8536 NO DOC ELDER MAL SCRN: HCPCS | Performed by: SURGERY

## 2021-05-12 PROCEDURE — 3288F FALL RISK ASSESSMENT DOCD: CPT | Performed by: SURGERY

## 2021-05-12 PROCEDURE — 1090F PRES/ABSN URINE INCON ASSESS: CPT | Performed by: SURGERY

## 2021-05-12 PROCEDURE — G8417 CALC BMI ABV UP PARAM F/U: HCPCS | Performed by: SURGERY

## 2021-05-12 PROCEDURE — 3017F COLORECTAL CA SCREEN DOC REV: CPT | Performed by: SURGERY

## 2021-05-12 PROCEDURE — 1100F PTFALLS ASSESS-DOCD GE2>/YR: CPT | Performed by: SURGERY

## 2021-05-12 NOTE — LETTER
5/24/2021 Patient: Malena Dobbins YOB: 1955 Date of Visit: 5/12/2021 Otis Edwards MD 
[address] [facility] [address] [address] [employer] Dear Otis Edwards MD, Thank you for referring Ms. Ronald Pereira to [facility] for evaluation. My notes for this consultation are attached. If you have questions, please do not hesitate to call me. I look forward to following your patient along with you. Sincerely, Rasta Trujillo MD

## 2021-05-12 NOTE — PROGRESS NOTES
1. Have you been to the ER, urgent care clinic since your last visit? Hospitalized since your last visit? No    2. Have you seen or consulted any other health care providers outside of the [address] [place] [address] since your last visit? Include any pap smears or colon screening.  No

## 2021-05-14 ENCOUNTER — TRANSCRIBE ORDER (OUTPATIENT)
Dept: REGISTRATION | Age: 66
End: 2021-05-14

## 2021-05-14 DIAGNOSIS — Z01.812 PRE-PROCEDURE LAB EXAM: Primary | ICD-10-CM

## 2021-05-16 ENCOUNTER — HOSPITAL ENCOUNTER (OUTPATIENT)
Dept: PREADMISSION TESTING | Age: 66
Discharge: HOME | End: 2021-05-16
Payer: MEDICARE

## 2021-05-16 DIAGNOSIS — Z01.812 PRE-PROCEDURE LAB EXAM: ICD-10-CM

## 2021-05-16 PROCEDURE — U0003 INFECTIOUS AGENT DETECTION BY NUCLEIC ACID (DNA OR RNA); SEVERE ACUTE RESPIRATORY SYNDROME CORONAVIRUS 2 (SARS-COV-2) (CORONAVIRUS DISEASE [COVID-19]), AMPLIFIED PROBE TECHNIQUE, MAKING USE OF HIGH THROUGHPUT TECHNOLOGIES AS DESCRIBED BY CMS-2020-01-R: HCPCS

## 2021-05-17 ENCOUNTER — PATIENT MESSAGE (OUTPATIENT)
Dept: SURGERY | Age: 66
End: 2021-05-17

## 2021-05-17 LAB — SARS-COV-2, COV2NT: NOT DETECTED

## 2021-05-20 ENCOUNTER — ANESTHESIA (OUTPATIENT)
Dept: SURGERY | Age: 66
End: 2021-05-20
Payer: MEDICARE

## 2021-05-20 ENCOUNTER — HOSPITAL ENCOUNTER (OUTPATIENT)
Age: 66
Setting detail: OUTPATIENT SURGERY
Discharge: HOME | End: 2021-05-20
Attending: SURGERY | Admitting: SURGERY
Payer: MEDICARE

## 2021-05-20 ENCOUNTER — ANESTHESIA EVENT (OUTPATIENT)
Dept: SURGERY | Age: 66
End: 2021-05-20
Payer: MEDICARE

## 2021-05-20 VITALS
DIASTOLIC BLOOD PRESSURE: 58 MMHG | TEMPERATURE: 98.2 F | WEIGHT: 188.93 LBS | SYSTOLIC BLOOD PRESSURE: 119 MMHG | HEIGHT: 61 IN | RESPIRATION RATE: 18 BRPM | OXYGEN SATURATION: 98 % | BODY MASS INDEX: 35.67 KG/M2 | HEART RATE: 87 BPM

## 2021-05-20 DIAGNOSIS — K80.20 SYMPTOMATIC CHOLELITHIASIS: Primary | ICD-10-CM

## 2021-05-20 LAB
GLUCOSE BLD STRIP.AUTO-MCNC: 118 MG/DL (ref 65–117)
SERVICE CMNT-IMP: ABNORMAL

## 2021-05-20 PROCEDURE — 77030040922 HC BLNKT HYPOTHRM STRY -A

## 2021-05-20 PROCEDURE — 82962 GLUCOSE BLOOD TEST: CPT

## 2021-05-20 PROCEDURE — 74011250636 HC RX REV CODE- 250/636: Performed by: ANESTHESIOLOGY

## 2021-05-20 PROCEDURE — 77030008608 HC TRCR ENDOSC SMTH AMR -B: Performed by: SURGERY

## 2021-05-20 PROCEDURE — 77030020829: Performed by: SURGERY

## 2021-05-20 PROCEDURE — 77030020263 HC SOL INJ SOD CL0.9% LFCR 1000ML: Performed by: SURGERY

## 2021-05-20 PROCEDURE — 77030008756 HC TU IRR SUC STRY -B: Performed by: SURGERY

## 2021-05-20 PROCEDURE — 77030010507 HC ADH SKN DERMBND J&J -B: Performed by: SURGERY

## 2021-05-20 PROCEDURE — 77030031139 HC SUT VCRL2 J&J -A: Performed by: SURGERY

## 2021-05-20 PROCEDURE — 77030012770 HC TRCR OPT FX AMR -B: Performed by: SURGERY

## 2021-05-20 PROCEDURE — 74011250636 HC RX REV CODE- 250/636: Performed by: SURGERY

## 2021-05-20 PROCEDURE — 74011250636 HC RX REV CODE- 250/636: Performed by: NURSE ANESTHETIST, CERTIFIED REGISTERED

## 2021-05-20 PROCEDURE — 47562 LAPAROSCOPIC CHOLECYSTECTOMY: CPT | Performed by: SURGERY

## 2021-05-20 PROCEDURE — 2709999900 HC NON-CHARGEABLE SUPPLY: Performed by: SURGERY

## 2021-05-20 PROCEDURE — 77030027743 HC APPL F/HEMSTAT BARD -B: Performed by: SURGERY

## 2021-05-20 PROCEDURE — 77030008684 HC TU ET CUF COVD -B: Performed by: NURSE ANESTHETIST, CERTIFIED REGISTERED

## 2021-05-20 PROCEDURE — 77030026438 HC STYL ET INTUB CARD -A: Performed by: NURSE ANESTHETIST, CERTIFIED REGISTERED

## 2021-05-20 PROCEDURE — 74011000250 HC RX REV CODE- 250: Performed by: SURGERY

## 2021-05-20 PROCEDURE — 76060000033 HC ANESTHESIA 1 TO 1.5 HR: Performed by: SURGERY

## 2021-05-20 PROCEDURE — 77030007955 HC PCH ENDOSC SPEC J&J -B: Performed by: SURGERY

## 2021-05-20 PROCEDURE — 76010000149 HC OR TIME 1 TO 1.5 HR: Performed by: SURGERY

## 2021-05-20 PROCEDURE — 77030010513 HC APPL CLP LIG J&J -C: Performed by: SURGERY

## 2021-05-20 PROCEDURE — 77030002895 HC DEV VASC CLOSR COVD -B: Performed by: SURGERY

## 2021-05-20 PROCEDURE — 88304 TISSUE EXAM BY PATHOLOGIST: CPT

## 2021-05-20 PROCEDURE — 77030002933 HC SUT MCRYL J&J -A: Performed by: SURGERY

## 2021-05-20 PROCEDURE — 76210000017 HC OR PH I REC 1.5 TO 2 HR: Performed by: SURGERY

## 2021-05-20 PROCEDURE — 77030040361 HC SLV COMPR DVT MDII -B: Performed by: SURGERY

## 2021-05-20 PROCEDURE — 74011250637 HC RX REV CODE- 250/637: Performed by: ANESTHESIOLOGY

## 2021-05-20 PROCEDURE — 77030032060 HC PWDR HEMSTAT ARISTA ASRB 3GM BARD -C: Performed by: SURGERY

## 2021-05-20 PROCEDURE — 77030037032 HC INSRT SCIS CLICKLLINE DISP STOR -B: Performed by: SURGERY

## 2021-05-20 PROCEDURE — 77030020053 HC ELECTRD LAPSCP COVD -B: Performed by: SURGERY

## 2021-05-20 PROCEDURE — 74011000250 HC RX REV CODE- 250: Performed by: NURSE ANESTHETIST, CERTIFIED REGISTERED

## 2021-05-20 RX ORDER — SODIUM CHLORIDE, SODIUM LACTATE, POTASSIUM CHLORIDE, CALCIUM CHLORIDE 600; 310; 30; 20 MG/100ML; MG/100ML; MG/100ML; MG/100ML
125 INJECTION, SOLUTION INTRAVENOUS CONTINUOUS
Status: DISCONTINUED | OUTPATIENT
Start: 2021-05-20 | End: 2021-05-20 | Stop reason: HOSPADM

## 2021-05-20 RX ORDER — ACETAMINOPHEN 325 MG/1
650 TABLET ORAL ONCE
Status: COMPLETED | OUTPATIENT
Start: 2021-05-20 | End: 2021-05-20

## 2021-05-20 RX ORDER — MIDAZOLAM HYDROCHLORIDE 1 MG/ML
INJECTION, SOLUTION INTRAMUSCULAR; INTRAVENOUS AS NEEDED
Status: DISCONTINUED | OUTPATIENT
Start: 2021-05-20 | End: 2021-05-20 | Stop reason: HOSPADM

## 2021-05-20 RX ORDER — SODIUM CHLORIDE 0.9 % (FLUSH) 0.9 %
5-40 SYRINGE (ML) INJECTION EVERY 8 HOURS
Status: DISCONTINUED | OUTPATIENT
Start: 2021-05-20 | End: 2021-05-20 | Stop reason: HOSPADM

## 2021-05-20 RX ORDER — FENTANYL CITRATE 50 UG/ML
50 INJECTION, SOLUTION INTRAMUSCULAR; INTRAVENOUS AS NEEDED
Status: DISCONTINUED | OUTPATIENT
Start: 2021-05-20 | End: 2021-05-20 | Stop reason: HOSPADM

## 2021-05-20 RX ORDER — SODIUM CHLORIDE, SODIUM LACTATE, POTASSIUM CHLORIDE, CALCIUM CHLORIDE 600; 310; 30; 20 MG/100ML; MG/100ML; MG/100ML; MG/100ML
INJECTION, SOLUTION INTRAVENOUS
Status: DISCONTINUED | OUTPATIENT
Start: 2021-05-20 | End: 2021-05-20 | Stop reason: HOSPADM

## 2021-05-20 RX ORDER — SODIUM CHLORIDE 9 MG/ML
25 INJECTION, SOLUTION INTRAVENOUS CONTINUOUS
Status: DISCONTINUED | OUTPATIENT
Start: 2021-05-20 | End: 2021-05-20 | Stop reason: HOSPADM

## 2021-05-20 RX ORDER — MIDAZOLAM HYDROCHLORIDE 1 MG/ML
1 INJECTION, SOLUTION INTRAMUSCULAR; INTRAVENOUS AS NEEDED
Status: DISCONTINUED | OUTPATIENT
Start: 2021-05-20 | End: 2021-05-20 | Stop reason: HOSPADM

## 2021-05-20 RX ORDER — HYDROMORPHONE HYDROCHLORIDE 1 MG/ML
0.2 INJECTION, SOLUTION INTRAMUSCULAR; INTRAVENOUS; SUBCUTANEOUS
Status: DISCONTINUED | OUTPATIENT
Start: 2021-05-20 | End: 2021-05-20 | Stop reason: HOSPADM

## 2021-05-20 RX ORDER — BUPIVACAINE HYDROCHLORIDE AND EPINEPHRINE 5; 5 MG/ML; UG/ML
INJECTION, SOLUTION EPIDURAL; INTRACAUDAL; PERINEURAL AS NEEDED
Status: DISCONTINUED | OUTPATIENT
Start: 2021-05-20 | End: 2021-05-20 | Stop reason: HOSPADM

## 2021-05-20 RX ORDER — SODIUM CHLORIDE 0.9 % (FLUSH) 0.9 %
5-40 SYRINGE (ML) INJECTION AS NEEDED
Status: DISCONTINUED | OUTPATIENT
Start: 2021-05-20 | End: 2021-05-20 | Stop reason: HOSPADM

## 2021-05-20 RX ORDER — ONDANSETRON 4 MG/1
4 TABLET, ORALLY DISINTEGRATING ORAL
Qty: 20 TABLET | Refills: 0 | Status: SHIPPED | OUTPATIENT
Start: 2021-05-20 | End: 2021-12-01

## 2021-05-20 RX ORDER — PROPOFOL 10 MG/ML
INJECTION, EMULSION INTRAVENOUS AS NEEDED
Status: DISCONTINUED | OUTPATIENT
Start: 2021-05-20 | End: 2021-05-20 | Stop reason: HOSPADM

## 2021-05-20 RX ORDER — ONDANSETRON 2 MG/ML
INJECTION INTRAMUSCULAR; INTRAVENOUS AS NEEDED
Status: DISCONTINUED | OUTPATIENT
Start: 2021-05-20 | End: 2021-05-20 | Stop reason: HOSPADM

## 2021-05-20 RX ORDER — LIDOCAINE HYDROCHLORIDE 20 MG/ML
INJECTION, SOLUTION EPIDURAL; INFILTRATION; INTRACAUDAL; PERINEURAL AS NEEDED
Status: DISCONTINUED | OUTPATIENT
Start: 2021-05-20 | End: 2021-05-20 | Stop reason: HOSPADM

## 2021-05-20 RX ORDER — KETOROLAC TROMETHAMINE 30 MG/ML
INJECTION, SOLUTION INTRAMUSCULAR; INTRAVENOUS AS NEEDED
Status: DISCONTINUED | OUTPATIENT
Start: 2021-05-20 | End: 2021-05-20 | Stop reason: HOSPADM

## 2021-05-20 RX ORDER — ROCURONIUM BROMIDE 10 MG/ML
INJECTION, SOLUTION INTRAVENOUS AS NEEDED
Status: DISCONTINUED | OUTPATIENT
Start: 2021-05-20 | End: 2021-05-20 | Stop reason: HOSPADM

## 2021-05-20 RX ORDER — SUCCINYLCHOLINE CHLORIDE 20 MG/ML
INJECTION INTRAMUSCULAR; INTRAVENOUS AS NEEDED
Status: DISCONTINUED | OUTPATIENT
Start: 2021-05-20 | End: 2021-05-20 | Stop reason: HOSPADM

## 2021-05-20 RX ORDER — DEXAMETHASONE SODIUM PHOSPHATE 4 MG/ML
INJECTION, SOLUTION INTRA-ARTICULAR; INTRALESIONAL; INTRAMUSCULAR; INTRAVENOUS; SOFT TISSUE AS NEEDED
Status: DISCONTINUED | OUTPATIENT
Start: 2021-05-20 | End: 2021-05-20 | Stop reason: HOSPADM

## 2021-05-20 RX ORDER — LIDOCAINE HYDROCHLORIDE 10 MG/ML
0.1 INJECTION, SOLUTION EPIDURAL; INFILTRATION; INTRACAUDAL; PERINEURAL AS NEEDED
Status: DISCONTINUED | OUTPATIENT
Start: 2021-05-20 | End: 2021-05-20 | Stop reason: HOSPADM

## 2021-05-20 RX ORDER — DEXMEDETOMIDINE HYDROCHLORIDE 100 UG/ML
INJECTION, SOLUTION INTRAVENOUS AS NEEDED
Status: DISCONTINUED | OUTPATIENT
Start: 2021-05-20 | End: 2021-05-20 | Stop reason: HOSPADM

## 2021-05-20 RX ORDER — FENTANYL CITRATE 50 UG/ML
25 INJECTION, SOLUTION INTRAMUSCULAR; INTRAVENOUS
Status: DISCONTINUED | OUTPATIENT
Start: 2021-05-20 | End: 2021-05-20 | Stop reason: HOSPADM

## 2021-05-20 RX ORDER — FENTANYL CITRATE 50 UG/ML
INJECTION, SOLUTION INTRAMUSCULAR; INTRAVENOUS AS NEEDED
Status: DISCONTINUED | OUTPATIENT
Start: 2021-05-20 | End: 2021-05-20 | Stop reason: HOSPADM

## 2021-05-20 RX ORDER — GLYCOPYRROLATE 0.2 MG/ML
INJECTION INTRAMUSCULAR; INTRAVENOUS AS NEEDED
Status: DISCONTINUED | OUTPATIENT
Start: 2021-05-20 | End: 2021-05-20 | Stop reason: HOSPADM

## 2021-05-20 RX ORDER — MORPHINE SULFATE 2 MG/ML
2 INJECTION, SOLUTION INTRAMUSCULAR; INTRAVENOUS
Status: DISCONTINUED | OUTPATIENT
Start: 2021-05-20 | End: 2021-05-20 | Stop reason: HOSPADM

## 2021-05-20 RX ORDER — MIDAZOLAM HYDROCHLORIDE 1 MG/ML
0.5 INJECTION, SOLUTION INTRAMUSCULAR; INTRAVENOUS
Status: DISCONTINUED | OUTPATIENT
Start: 2021-05-20 | End: 2021-05-20 | Stop reason: HOSPADM

## 2021-05-20 RX ORDER — DIPHENHYDRAMINE HYDROCHLORIDE 50 MG/ML
12.5 INJECTION, SOLUTION INTRAMUSCULAR; INTRAVENOUS AS NEEDED
Status: DISCONTINUED | OUTPATIENT
Start: 2021-05-20 | End: 2021-05-20 | Stop reason: HOSPADM

## 2021-05-20 RX ORDER — NEOSTIGMINE METHYLSULFATE 1 MG/ML
INJECTION, SOLUTION INTRAVENOUS AS NEEDED
Status: DISCONTINUED | OUTPATIENT
Start: 2021-05-20 | End: 2021-05-20 | Stop reason: HOSPADM

## 2021-05-20 RX ORDER — OXYCODONE AND ACETAMINOPHEN 5; 325 MG/1; MG/1
1 TABLET ORAL AS NEEDED
Status: DISCONTINUED | OUTPATIENT
Start: 2021-05-20 | End: 2021-05-20 | Stop reason: HOSPADM

## 2021-05-20 RX ORDER — IBUPROFEN 800 MG/1
800 TABLET ORAL
Qty: 30 TABLET | Refills: 0 | Status: SHIPPED | OUTPATIENT
Start: 2021-05-20

## 2021-05-20 RX ORDER — OXYCODONE AND ACETAMINOPHEN 5; 325 MG/1; MG/1
1 TABLET ORAL
Qty: 30 TABLET | Refills: 0 | Status: SHIPPED | OUTPATIENT
Start: 2021-05-20 | End: 2021-05-27

## 2021-05-20 RX ORDER — ONDANSETRON 2 MG/ML
4 INJECTION INTRAMUSCULAR; INTRAVENOUS AS NEEDED
Status: DISCONTINUED | OUTPATIENT
Start: 2021-05-20 | End: 2021-05-20 | Stop reason: HOSPADM

## 2021-05-20 RX ADMIN — ROCURONIUM BROMIDE 10 MG: 10 SOLUTION INTRAVENOUS at 12:18

## 2021-05-20 RX ADMIN — PHENYLEPHRINE HYDROCHLORIDE 80 MCG: 10 INJECTION INTRAVENOUS at 12:10

## 2021-05-20 RX ADMIN — SODIUM CHLORIDE, POTASSIUM CHLORIDE, SODIUM LACTATE AND CALCIUM CHLORIDE: 600; 310; 30; 20 INJECTION, SOLUTION INTRAVENOUS at 11:45

## 2021-05-20 RX ADMIN — PHENYLEPHRINE HYDROCHLORIDE 40 MCG/MIN: 10 INJECTION INTRAVENOUS at 12:32

## 2021-05-20 RX ADMIN — SUCCINYLCHOLINE CHLORIDE 130 MG: 20 INJECTION, SOLUTION INTRAMUSCULAR; INTRAVENOUS at 11:53

## 2021-05-20 RX ADMIN — LIDOCAINE HYDROCHLORIDE 100 MG: 20 INJECTION, SOLUTION EPIDURAL; INFILTRATION; INTRACAUDAL; PERINEURAL at 11:51

## 2021-05-20 RX ADMIN — FENTANYL CITRATE 100 MCG: 50 INJECTION, SOLUTION INTRAMUSCULAR; INTRAVENOUS at 11:51

## 2021-05-20 RX ADMIN — PHENYLEPHRINE HYDROCHLORIDE 80 MCG: 10 INJECTION INTRAVENOUS at 12:03

## 2021-05-20 RX ADMIN — ROCURONIUM BROMIDE 20 MG: 10 SOLUTION INTRAVENOUS at 12:00

## 2021-05-20 RX ADMIN — PROPOFOL 150 MG: 10 INJECTION, EMULSION INTRAVENOUS at 11:52

## 2021-05-20 RX ADMIN — KETOROLAC TROMETHAMINE 30 MG: 30 INJECTION, SOLUTION INTRAMUSCULAR; INTRAVENOUS at 12:41

## 2021-05-20 RX ADMIN — PHENYLEPHRINE HYDROCHLORIDE 80 MCG: 10 INJECTION INTRAVENOUS at 12:00

## 2021-05-20 RX ADMIN — SODIUM CHLORIDE, POTASSIUM CHLORIDE, SODIUM LACTATE AND CALCIUM CHLORIDE 125 ML/HR: 600; 310; 30; 20 INJECTION, SOLUTION INTRAVENOUS at 13:30

## 2021-05-20 RX ADMIN — NEOSTIGMINE METHYLSULFATE 3 MG: 1 INJECTION, SOLUTION INTRAVENOUS at 12:44

## 2021-05-20 RX ADMIN — ONDANSETRON 4 MG: 2 INJECTION INTRAMUSCULAR; INTRAVENOUS at 13:33

## 2021-05-20 RX ADMIN — MIDAZOLAM 2 MG: 1 INJECTION INTRAMUSCULAR; INTRAVENOUS at 11:46

## 2021-05-20 RX ADMIN — DEXMEDETOMIDINE HYDROCHLORIDE 5 MCG: 100 INJECTION, SOLUTION, CONCENTRATE INTRAVENOUS at 12:15

## 2021-05-20 RX ADMIN — GLYCOPYRROLATE 0.6 MG: 0.2 INJECTION, SOLUTION INTRAMUSCULAR; INTRAVENOUS at 12:44

## 2021-05-20 RX ADMIN — SODIUM CHLORIDE, POTASSIUM CHLORIDE, SODIUM LACTATE AND CALCIUM CHLORIDE 125 ML/HR: 600; 310; 30; 20 INJECTION, SOLUTION INTRAVENOUS at 11:24

## 2021-05-20 RX ADMIN — ACETAMINOPHEN 650 MG: 325 TABLET ORAL at 11:28

## 2021-05-20 RX ADMIN — DEXAMETHASONE SODIUM PHOSPHATE 4 MG: 4 INJECTION, SOLUTION INTRAMUSCULAR; INTRAVENOUS at 11:57

## 2021-05-20 RX ADMIN — DEXMEDETOMIDINE HYDROCHLORIDE 5 MCG: 100 INJECTION, SOLUTION, CONCENTRATE INTRAVENOUS at 12:11

## 2021-05-20 RX ADMIN — ROCURONIUM BROMIDE 5 MG: 10 SOLUTION INTRAVENOUS at 11:51

## 2021-05-20 RX ADMIN — WATER 2 G: 1 INJECTION INTRAMUSCULAR; INTRAVENOUS; SUBCUTANEOUS at 12:00

## 2021-05-20 RX ADMIN — ONDANSETRON HYDROCHLORIDE 4 MG: 2 INJECTION, SOLUTION INTRAMUSCULAR; INTRAVENOUS at 11:57

## 2021-05-20 NOTE — DISCHARGE INSTRUCTIONS
Laparoscopic cholecystectomy      Patient Discharge Instructions    [place] / [id number] : 1955    Admitted 2021 Discharged: 2021       PATIENT INSTRUCTIONS  GALLBLADDER SURGERY  (CHOLECYSTECTOMY)    FOLLOW-UP:  Please make an appointment with your physician in 10 - 14 day(s). Call your physician immediately if you have any fevers greater than 101.5, drainage from your wound that is not clear or looks infected, persistent bleeding, increasing abdominal pain, problems urinating, or persistent nausea/vomiting. You should be aware that you may have right shoulder pain after surgery and that this will progressively go away. This is called 'referred pain' and is from the area of the gallbladder. It can also be caused by gas that may be trapped under the diaphragm from the surgery, especially if it was performed laparoscopically through mini-incisions. This gas will progressively get reabsorbed by your body. WOUND CARE INSTRUCTIONS:   You may shower at home. If clothing rubs against the wound or causes irritation and the wound is not draining you may cover it with a dry dressing during the daytime. Try to keep the wound dry and avoid ointments on the wound unless directed to do so. If the wound becomes bright red and painful or starts to drain infected material that is not clear, please contact your physician immediately. You should also call if you begin to drain fluid that is thin and greenish-brown from the wound and appears to look like bile. If the wound though is mildly pink and has a thick firm ridge underneath it, this is normal, and is referred to as a healing ridge. This will resolve over the next 4-6 weeks. Place an ice pack on the navel incision for the next 48 hours. After that, you may use a heating pad if you feel muscle tightening or pulling. DIET:  You may eat any foods that you can tolerate.   It is a good idea to eat a high fiber diet and take in plenty of fluids to prevent constipation. If you do become constipated you may want to take a mild laxative or take ducolax tablets on a daily basis until your bowel habits are regular. Constipation can be very uncomfortable, along with straining, after recent abdominal surgery. ACTIVITY:  You are encouraged to cough and deep breath or use your incentive spirometer if you were given one, every 15-30 minutes when awake. This will help prevent respiratory complications and low grade fevers post-operatively. You may want to hug a pillow when coughing and sneezing to add additional support to the surgical area(s) which will decrease pain during these times. You are encouraged to walk and engage in light activity for the next two weeks. You should not lift more than 20 pounds during this time frame as it could put you at increased risk for a post-operative hernia. Twenty pounds is roughly equivalent to a plastic bag of groceries. · Most people are able to return to work within 1 to 2 weeks after surgery. · You may shower 24 hours after surgery. Pat the cut (incision) dry. Do not take a bath for the first week. · Your doctor will tell you when you can have sex again. MEDICATIONS:  Try to take narcotic medications and anti-inflammatory medications, such as tylenol, ibuprofen, naprosyn, etc., with food. This will minimize stomach upset from the medication. Should you develop nausea and vomiting from the pain medication, or develop a rash, please discontinue the medication and contact your physician. You should not drive, make important decisions, or operate machinery when taking narcotic pain medication. · Take ibuprofen (Motrin) as scheduled then combine with oxycodone/acetaminophen (Percocet, Roxicet, Tylox) as needed for severe pain. QUESTIONS:  Please feel free to call Dr. Kevin Abreu office ([phone]) if you have any questions, and they will be glad to assist you. Follow-up with Dr. Babs Inman in 2 week(s). Call the office to schedule your appointment. Information obtained by :    I understand that if any problems occur once I am at home I am to contact my physician. I understand and acknowledge receipt of the instructions indicated above. Physician's or R.N.'s Signature                                                                  Date/Time                                                                                                                                              Patient or Representative Signature                                                          Date/Time         Adult Sedation Discharge Instructions        Common side effects associated with each of these medications includes:  · Drowsiness, dizziness, euphoria, sleepiness or confusion  · Impaired memory recall  · Unsteady gait, loss of fine muscle control and delayed reaction time  · Visual disturbances, difficulty focusing, blurred vision    You may experience some of these side effects or you may not be aware of subtle changes in your behavior or reaction time. Because you received these medications, we are giving you the following instructions. Discharge Instructions:  · Do not consume alcoholic beverages for a minimum of 24 hours  · Do not make important personal, legal or business decisions for 24 hours  · Move slowly and carefully, do not make sudden position changes. Be alert for dizziness or lightheadedness and move accordingly. Have a responsible person assist you.   · Do not drive for 24 hours  · Do not operate equipment for 24 hours - [employer], power tools, Kitchen accessories: stove, etc.    Diet/Diet Restrictions: Advance your diet as tolerated    Pain Management:   {PAIN MANAGEMENT:20241}    If you have any questions or concerns contact:     A) Call Dr. Araceli Ybarra office OR     B) Call your Primary Care Physician             OR     C) If you feel you have a life threatening emergency call 911    If you report to an emergency room, doctors office or hospital within 24 hours, BRING THIS [address] and give it to the nurse or physician attending to you.

## 2021-05-20 NOTE — BRIEF OP NOTE
Brief Postoperative Note    Patient: Nadege Arvizu  YOB: 1955  MRN: [id number]    Date of Procedure: 5/20/2021     Pre-Op Diagnosis: SYMPTOMATIC CHOLELITHIASIS    Post-Op Diagnosis: Same as preoperative diagnosis.       Procedure(s):  LAPAROSCOPIC CHOLECYSTECTOMY    Surgeon(s):  Georgia Duque MD    Surgical Assistant: Surg Asst-1: Didi Momin    Anesthesia: General     Estimated Blood Loss (mL): less than 50     Complications: None    Specimens:   ID Type Source Tests Collected by Time Destination   1 : Gallbladder Fresh Gallbladder  Georgia Duque MD 5/20/2021 1215 Pathology        Implants: * No implants in log *    Drains: * No LDAs found *    Findings: distended GB found, minimal inflammation    Electronically Signed by Dilip Vasquez MD on 5/20/2021 at 12:55 PM

## 2021-05-20 NOTE — PERIOP NOTES
Spoke with patients  Grant Salcedo via cell phone and updated him on start of procedure. Surgeon aware.

## 2021-05-20 NOTE — ANESTHESIA POSTPROCEDURE EVALUATION
Post-Anesthesia Evaluation and Assessment    Patient: Tj Giron MRN: [id number]  SSN: [id number]    YOB: 1955  Age: 77 y.o. Sex: female      I have evaluated the patient and they are stable and ready for discharge from the PACU. Cardiovascular Function/Vital Signs  Visit Vitals  BP (!) 114/58   Pulse 75   Temp 36.5 °C (97.7 °F)   Resp 8   Ht (!) 5.1\" (0.13 m)   Wt 85.7 kg (189 lb)   SpO2 99%   BMI 5108.86 kg/m²       Patient is status post General anesthesia for Procedure(s):  LAPAROSCOPIC CHOLECYSTECTOMY. Nausea/Vomiting: None    Postoperative hydration reviewed and adequate. Pain:  Pain Scale 1: Numeric (0 - 10) (05/20/21 1330)  Pain Intensity 1: 4 (05/20/21 1330)   Managed    Neurological Status:   Neuro (WDL): Within Defined Limits (05/20/21 1302)  Neuro  LUE Motor Response: Purposeful (05/20/21 1302)  LLE Motor Response: Purposeful (05/20/21 1302)  RUE Motor Response: Purposeful (05/20/21 1302)  RLE Motor Response: Purposeful (05/20/21 1302)   At baseline    Mental Status, Level of Consciousness: Alert and  oriented to person, place, and time    Pulmonary Status:   O2 Device: Nasal cannula (05/20/21 1345)   Adequate oxygenation and airway patent    Complications related to anesthesia: None    Post-anesthesia assessment completed. No concerns    Signed By: Joelle Simmons MD     May 20, 2021              Procedure(s):  LAPAROSCOPIC CHOLECYSTECTOMY. general    <BSHSIANPOST>    INITIAL Post-op Vital signs:   Vitals Value Taken Time   /58 05/20/21 1345   Temp 36.5 °C (97.7 °F) 05/20/21 1303   Pulse 83 05/20/21 1353   Resp 17 05/20/21 1353   SpO2 100 % 05/20/21 1353   Vitals shown include unvalidated device data.

## 2021-05-20 NOTE — ANESTHESIA PREPROCEDURE EVALUATION
Relevant Problems   ENDOCRINE   (+) Severe obesity (HCC)   (+) Type II diabetes mellitus (HCC)       Anesthetic History   No history of anesthetic complications            Review of Systems / Medical History  Patient summary reviewed, nursing notes reviewed and pertinent labs reviewed    Pulmonary  Within defined limits                 Neuro/Psych   Within defined limits           Cardiovascular  Within defined limits                Exercise tolerance: >4 METS     GI/Hepatic/Renal  Within defined limits             Comments: nita Endo/Other  Within defined limits  Diabetes: type 2    Morbid obesity     Other Findings              Physical Exam    Airway  Mallampati: II  TM Distance: > 6 cm  Neck ROM: normal range of motion   Mouth opening: Normal     Cardiovascular  Regular rate and rhythm,  S1 and S2 normal,  no murmur, click, rub, or gallop             Dental  No notable dental hx       Pulmonary  Breath sounds clear to auscultation               Abdominal  GI exam deferred       Other Findings            Anesthetic Plan    ASA: 2  Anesthesia type: general          Induction: Intravenous  Anesthetic plan and risks discussed with: Patient

## 2021-05-20 NOTE — H&P
[facility] at [facility] History and Physical    History of Present Illness:      Shaina Naqvi is a 77 y.o. female who has been having issues with RUQ abdominal pain. The pain has been increasing in frequency of attacks. She has had a few attacks. The pain may be related to eating and sometimes not and can be random. She is feeling well currently. No changes in BM. Past Medical History:   Diagnosis Date    Calculus of kidney     Diabetes ([address]     Headache        Past Surgical History:   Procedure Laterality Date    HX CYST REMOVAL  1980    HX GYN  2003    hysterectomy    [place] BREAST SURGERY PROCEDURE UNLISTED  1999         Current Facility-Administered Medications:     lactated Ringers infusion, 125 mL/hr, IntraVENous, CONTINUOUS, Keily Powers MD    0.9% sodium chloride infusion, 25 mL/hr, IntraVENous, CONTINUOUS, Keily Powers MD    sodium chloride (NS) flush 5-40 mL, 5-40 mL, IntraVENous, Q8H, Keily Powers MD    sodium chloride (NS) flush 5-40 mL, 5-40 mL, IntraVENous, PRN, Keily Powers MD    lidocaine (PF) (XYLOCAINE) 10 mg/mL (1 %) injection 0.1 mL, 0.1 mL, SubCUTAneous, PRN, Keily Powers MD    fentaNYL citrate (PF) injection 50 mcg, 50 mcg, IntraVENous, PRN, Keily Powers MD    midazolam (VERSED) injection 1 mg, 1 mg, IntraVENous, PRN, Keily Perez MD    acetaminophen (TYLENOL) tablet 650 mg, 650 mg, Oral, ONCE, Keily Powers MD    Allergies   Allergen Reactions    Codeine Other (comments)     Past out    Flomax [Tamsulosin] Other (comments)     presyncope    Tetanus And Diphther.  Tox (Pf) Swelling       Social History     Socioeconomic History    Marital status:      Spouse name: Not on file    Number of children: Not on file    Years of education: Not on file    Highest education level: Not on file   Occupational History    Not on file   Tobacco Use    Smoking status: Never Smoker    Smokeless tobacco: Never Used   Substance and Sexual Activity    Alcohol use: No    Drug use: No    Sexual activity: Not Currently     Partners: Male   Other Topics Concern    Not on file   Social History Narrative    Not on file     Social Determinants of Health     Financial Resource Strain:     Difficulty of Paying Living Expenses:    Food Insecurity:     Worried About Running Out of Food in the Last Year:     [address] in the Last Year:    Transportation Needs:     Lack of Transportation (Medical):  Lack of Transportation (Non-Medical):    Physical Activity:     Days of Exercise per Week:     Minutes of Exercise per Session:    Stress:     Feeling of Stress :    Social Connections:     Frequency of Communication with Friends and Family:     Frequency of Social Gatherings with Friends and Family:     Attends [employer] Services:     Active Member of Clubs or Organizations:     Attends Club or Organization Meetings:     Marital Status:    Intimate Partner Violence:     Fear of Current or Ex-Partner:     Emotionally Abused:     Physically Abused:     Sexually Abused:        Family History   Problem Relation Age of Onset    Gall Bladder Disease Mother     Cancer Father         colon, liver    Stroke Father         TIAs    Parkinson's Disease Father     Migraines Father        ROS   Constitutional: negative  Ears, Nose, Mouth, Throat, and Face: negative  Respiratory: negative  Cardiovascular: negative  Gastrointestinal: negative  Genitourinary:negative  Integument/Breast: negative  Hematologic/Lymphatic: negative  Behavioral/Psychiatric: negative  Allergic/Immunologic: negative      Physical Exam:     There were no vitals taken for this visit.     General - alert and oriented, no apparent distress  HEENT - no jaundice, no hearing imparement  Pulm - CTAB, no C/W/R  CV - RRR, no M/R/G  Abd - soft, ND, BS present, minimal TTP in RUQ, no guarding  Ext - pulses intact in UE and LE bilaterally, no edema  Skin - supple, no rashes  Psychiatric - normal affect, good mood    Labs  Lab Results   Component Value Date/Time    Sodium 140 04/29/2021 09:35 AM    Potassium 3.9 04/29/2021 09:35 AM    Chloride 106 04/29/2021 09:35 AM    CO2 25 04/29/2021 09:35 AM    Anion gap 9 04/29/2021 09:35 AM    Glucose 114 (H) 04/29/2021 09:35 AM    BUN 14 04/29/2021 09:35 AM    Creatinine 0.63 04/29/2021 09:35 AM    BUN/Creatinine ratio 22 (H) 04/29/2021 09:35 AM    GFR est AA >60 04/29/2021 09:35 AM    GFR est non-AA >60 04/29/2021 09:35 AM    Calcium 9.0 04/29/2021 09:35 AM    Bilirubin, total 0.4 04/29/2021 09:35 AM    Alk. phosphatase 89 04/29/2021 09:35 AM    Protein, total 6.9 04/29/2021 09:35 AM    Albumin 3.7 04/29/2021 09:35 AM    Globulin 3.2 04/29/2021 09:35 AM    A-G Ratio 1.2 04/29/2021 09:35 AM    ALT (SGPT) 11 (L) 04/29/2021 09:35 AM    AST (SGOT) 8 (L) 04/29/2021 09:35 AM     Lab Results   Component Value Date/Time    WBC 6.9 04/29/2021 09:35 AM    HGB 12.9 04/29/2021 09:35 AM    HCT 41.4 04/29/2021 09:35 AM    PLATELET 160 46/77/3344 09:35 AM    MCV 92.0 04/29/2021 09:35 AM       Imaging  FINDINGS:  LIVER:   The liver is normal in echotexture with no mass or other focal abnormality.      LIVER VASCULATURE:   The portal vein flow is hepatopedal.     GALLBLADDER:  The gallbladder contains mobile stones. The patient appeared to be There is no  wall thickening or fluid around the gallbladder.      COMMON BILE DUCT:  There is no biliary duct dilatation and the common duct measures 3 mm in  diameter.      PANCREAS:  The pancreas was not well seen because of gas and the patient's body habitus. .     SPLEEN:  The spleen is normal in echotexture and size and measures 10.6 cm in length.     RIGHT KIDNEY:  The right kidney demonstrates normal echogenicity with no mass, stone or  hydronephrosis.  The right kidney measures 10.9 cm in length.     LEFT KIDNEY:  The left kidney demonstrates normal echogenicity with no mass, stone or  hydronephrosis. The left kidney measures 10.9 cm in length.      RETROPERITONEUM:  The aorta tapers normally. The IVC is normal.           IMPRESSION     Cholelithiasis with mobile gallstones. No gallbladder wall thickening or biliary  dilatation. The patient did appear to be tender in the region of the  gallbladder.     Otherwise negative abdominal ultrasound. Pancreas not visualized.     I have reviewed and agree with all of the pertinent images    Assessment:     Debbie Redd is a 77 y.o. female with symptomatic cholelithiasis    Recommendations:     1. She will need laparoscopic cholecystectomy in the OR for her GB related pain. No need for cholangiogram. I have discussed the above procedure with the patient in detail. We reviewed the benefits and possible complications of the surgery which include bleeding, infection, damage to adjacent organs, venous thromboembolism, need for repeat surgery, death and other unforseen complications. The patient agreed to proceed with the surgery. Tonya Parra MD    Greater than half of the time: 30 minutes was used in counciling the patient about diagnosis and treatment plan    Ms. Belinda Singer has a reminder for a \"due or due soon\" health maintenance. I have asked that she contact her primary care provider for follow-up on this health maintenance.

## 2021-05-20 NOTE — PROGRESS NOTES
D/C Instructions provided to the pt's :  Latonia Mack who is waiting in the Surgical Waiting Area. All questions were answered @ this ritesh. e

## 2021-05-20 NOTE — OP NOTES
[address]   OPERATIVE REPORT    Name:  Panchito Ellsworth  MR#:  [id number]  :  1955  ACCOUNT #:  [de-identified]  DATE OF SERVICE:  2021      PREOPERATIVE DIAGNOSIS:  Symptomatic cholelithiasis. POSTOPERATIVE DIAGNOSIS:  Symptomatic cholelithiasis. PROCEDURE PERFORMED:  Laparoscopic cholecystectomy. SURGEON:  Rasta Trujillo MD    ASSISTANT:  Ethan Overton SA    ANESTHESIA:  General.    COMPLICATIONS:  None. SPECIMENS REMOVED:  Gallbladder. IMPLANTS:  None. ESTIMATED BLOOD LOSS:  Less than 50 mL. DRAINS:  None. FINDINGS:  Distended gallbladder with minimal inflammation. INDICATIONS FOR OPERATION:  The patient is a 26-year-old female with history of symptomatic cholelithiasis with gallstones on ultrasound who is needing laparoscopic cholecystectomy. DESCRIPTION OF OPERATION:  The patient was met in the preop holding area. The H and P was updated. Consent was signed. All risks and benefits were explained to the patient prior to the start of the operation. She was taken back to the operating room. She was lying in a supine position. The abdomen was prepped and draped in standard sterile fashion. Time-out was called. Antibiotics were given. SCDs were on lower extremities. I started the operation by making a 5-mm incision into the right upper quadrant inserting a VisiPort trocar into intra-abdominal cavity insufflating to 15 mmHg. We then placed a 5-mm trocar superior to the umbilicus, a 32-GZ subxiphoid port, and a 5-mm right lateral port. We then found the gallbladder. We found it to be distended, but we were able to grasp it. There appeared to be very minimal inflammation. We then grasped the gallbladder and pushed it up and over the liver.   We dissected down to the infundibulum of the gallbladder, dissecting free the cystic duct and cystic artery, identifying both structures clearly going into the gallbladder with no intervening structures in between. Critical view of safety was obtained. We then placed two clips on the cystic artery on the patient's side, one on the distal side and cut in between. We dissected around the cystic duct a little bit further just getting more of the loose tissue from off of the duct. We then placed three clips on the cystic duct on the patient's side, one on the distal side, and cut in between. We then removed the gallbladder from the gallbladder fossa with hook cautery cauterizing any bleeding from liver bed along the way. We then removed the gallbladder from a 12-mm Endo Catch bag, passed it off the field, and then used a little bit of Jone powder into the gallbladder fossa for a little bit of oozing. Gallbladder fossa was now hemostatic, there was no bleeding, and we had also washed out the right upper quadrant with 500 mL of saline irrigation. We then closed our 12-mm subxiphoid port defect with an Endo Close suture passing device in an interrupted figure-of-eight fashion with 0 Vicryl suture. We then desufflated the abdominal cavity, removed the trocars, and closed the skin with 4-0 Monocryl and Dermabond to complete the operation. Dr. Paras Lee was present and scrubbed during the entire operation. The counts were correct.       Raphael Holguin MD      [id number]  D:  05/20/2021 13:00  T:  05/20/2021 15:27  JOB #:  [id number]

## 2021-05-24 NOTE — PROGRESS NOTES
[facility] Surgical Specialists at [facility] History and Physical     History of Present Illness:      Yandy Gonzalez is a 77 y.o. female who has been having issues with RUQ abdominal pain. The pain has been increasing in frequency of attacks. She has had a few attacks. The pain may be related to eating and sometimes not and can be random. She is feeling well currently. No changes in BM.          Past Medical History:   Diagnosis Date    Calculus of kidney      Diabetes ([address]      Headache                 Past Surgical History:   Procedure Laterality Date    HX CYST REMOVAL   1980    HX GYN   2003     hysterectomy    [place] BREAST SURGERY PROCEDURE UNLISTED   1999            Current Facility-Administered Medications:     lactated Ringers infusion, 125 mL/hr, IntraVENous, CONTINUOUS, Momo Powers MD    0.9% sodium chloride infusion, 25 mL/hr, IntraVENous, CONTINUOUS, Momo Powers MD    sodium chloride (NS) flush 5-40 mL, 5-40 mL, IntraVENous, Q8H, Momo Powers MD    sodium chloride (NS) flush 5-40 mL, 5-40 mL, IntraVENous, PRN, Momo Powers MD    lidocaine (PF) (XYLOCAINE) 10 mg/mL (1 %) injection 0.1 mL, 0.1 mL, SubCUTAneous, PRN, Momo Powers MD    fentaNYL citrate (PF) injection 50 mcg, 50 mcg, IntraVENous, PRN, Momo Downs MD    midazolam (VERSED) injection 1 mg, 1 mg, IntraVENous, PRN, Momo Downs MD    acetaminophen (TYLENOL) tablet 650 mg, 650 mg, Oral, ONCE, Momo Powers MD           Allergies   Allergen Reactions    Codeine Other (comments)       Past out    Flomax [Tamsulosin] Other (comments)       presyncope    Tetanus And Diphther.  Tox (Pf) Swelling         Social History            Socioeconomic History    Marital status:        Spouse name: Not on file    Number of children: Not on file    Years of education: Not on file    Highest education level: Not on file   Occupational History    Not on file   Tobacco Use    Smoking status: Never Smoker    Smokeless tobacco: Never Used   Substance and Sexual Activity    Alcohol use: No    Drug use: No    Sexual activity: Not Currently       Partners: Male   Other Topics Concern    Not on file   Social History Narrative    Not on file      Social Determinants of Health          Financial Resource Strain:     Difficulty of Paying Living Expenses:    Food Insecurity:     Worried About Running Out of Food in the Last Year:     [address] in the Last Year:    Transportation Needs:     Lack of Transportation (Medical):      Lack of Transportation (Non-Medical):    Physical Activity:     Days of Exercise per Week:     Minutes of Exercise per Session:    Stress:     Feeling of Stress :    Social Connections:     Frequency of Communication with Friends and Family:     Frequency of Social Gatherings with Friends and Family:     Attends [employer] Services:     Active Member of Clubs or Organizations:     Attends Club or Organization Meetings:     Marital Status:    Intimate Partner Violence:     Fear of Current or Ex-Partner:     Emotionally Abused:     Physically Abused:     Sexually Abused:                Family History   Problem Relation Age of Onset    Gall Bladder Disease Mother      Cancer Father           colon, liver    Stroke Father           TIAs    Parkinson's Disease Father      Migraines Father           ROS   Constitutional: negative  Ears, Nose, Mouth, Throat, and Face: negative  Respiratory: negative  Cardiovascular: negative  Gastrointestinal: negative  Genitourinary:negative  Integument/Breast: negative  Hematologic/Lymphatic: negative  Behavioral/Psychiatric: negative  Allergic/Immunologic: negative        Physical Exam:      There were no vitals taken for this visit.     General - alert and oriented, no apparent distress  HEENT - no jaundice, no hearing imparement  Pulm - CTAB, no C/W/R  CV - RRR, no M/R/G  Abd - soft, ND, BS present, minimal TTP in RUQ, no guarding  Ext - pulses intact in UE and LE bilaterally, no edema  Skin - supple, no rashes  Psychiatric - normal affect, good mood     Labs        Lab Results   Component Value Date/Time     Sodium 140 04/29/2021 09:35 AM     Potassium 3.9 04/29/2021 09:35 AM     Chloride 106 04/29/2021 09:35 AM     CO2 25 04/29/2021 09:35 AM     Anion gap 9 04/29/2021 09:35 AM     Glucose 114 (H) 04/29/2021 09:35 AM     BUN 14 04/29/2021 09:35 AM     Creatinine 0.63 04/29/2021 09:35 AM     BUN/Creatinine ratio 22 (H) 04/29/2021 09:35 AM     GFR est AA >60 04/29/2021 09:35 AM     GFR est non-AA >60 04/29/2021 09:35 AM     Calcium 9.0 04/29/2021 09:35 AM     Bilirubin, total 0.4 04/29/2021 09:35 AM     Alk. phosphatase 89 04/29/2021 09:35 AM     Protein, total 6.9 04/29/2021 09:35 AM     Albumin 3.7 04/29/2021 09:35 AM     Globulin 3.2 04/29/2021 09:35 AM     A-G Ratio 1.2 04/29/2021 09:35 AM     ALT (SGPT) 11 (L) 04/29/2021 09:35 AM     AST (SGOT) 8 (L) 04/29/2021 09:35 AM            Lab Results   Component Value Date/Time     WBC 6.9 04/29/2021 09:35 AM     HGB 12.9 04/29/2021 09:35 AM     HCT 41.4 04/29/2021 09:35 AM     PLATELET 653 53/26/6147 09:35 AM     MCV 92.0 04/29/2021 09:35 AM         Imaging  FINDINGS:  LIVER:   The liver is normal in echotexture with no mass or other focal abnormality.      LIVER VASCULATURE:   The portal vein flow is hepatopedal.     GALLBLADDER:  The gallbladder contains mobile stones. The patient appeared to be There is no  wall thickening or fluid around the gallbladder.      COMMON BILE DUCT:  There is no biliary duct dilatation and the common duct measures 3 mm in  diameter.      PANCREAS:  The pancreas was not well seen because of gas and the patient's body habitus. .     SPLEEN:  The spleen is normal in echotexture and size and measures 10.6 cm in length.     RIGHT KIDNEY:  The right kidney demonstrates normal echogenicity with no mass, stone or  hydronephrosis. The right kidney measures 10.9 cm in length.     LEFT KIDNEY:  The left kidney demonstrates normal echogenicity with no mass, stone or  hydronephrosis.  The left kidney measures 10.9 cm in length.      RETROPERITONEUM:  The aorta tapers normally. The IVC is normal.           IMPRESSION     Cholelithiasis with mobile gallstones. No gallbladder wall thickening or biliary  dilatation. The patient did appear to be tender in the region of the  gallbladder.     Otherwise negative abdominal ultrasound. Pancreas not visualized.     I have reviewed and agree with all of the pertinent images     Assessment:      Crow Romero is a 77 y.o. female with symptomatic cholelithiasis     Recommendations:      1. She will need laparoscopic cholecystectomy in the OR for her GB related pain. No need for cholangiogram. I have discussed the above procedure with the patient in detail. We reviewed the benefits and possible complications of the surgery which include bleeding, infection, damage to adjacent organs, venous thromboembolism, need for repeat surgery, death and other unforseen complications.   The patient agreed to proceed with the surgery.          Sanjay Alfaro MD     Greater than half of the time: 30 minutes was used in counciling the patient about diagnosis and treatment plan

## 2021-06-02 ENCOUNTER — OFFICE VISIT (OUTPATIENT)
Dept: SURGERY | Age: 66
End: 2021-06-02
Payer: MEDICARE

## 2021-06-02 VITALS
WEIGHT: 189 LBS | OXYGEN SATURATION: 97 % | HEART RATE: 77 BPM | HEIGHT: 61 IN | DIASTOLIC BLOOD PRESSURE: 83 MMHG | SYSTOLIC BLOOD PRESSURE: 134 MMHG | TEMPERATURE: 98.5 F | BODY MASS INDEX: 35.68 KG/M2 | RESPIRATION RATE: 16 BRPM

## 2021-06-02 DIAGNOSIS — Z09 POSTOPERATIVE EXAMINATION: Primary | ICD-10-CM

## 2021-06-02 DIAGNOSIS — Z90.49 S/P LAPAROSCOPIC CHOLECYSTECTOMY: ICD-10-CM

## 2021-06-02 PROCEDURE — 99024 POSTOP FOLLOW-UP VISIT: CPT | Performed by: NURSE PRACTITIONER

## 2021-06-02 RX ORDER — ACETAMINOPHEN 500 MG
2000 TABLET ORAL
COMMUNITY
End: 2021-06-07

## 2021-06-02 NOTE — PROGRESS NOTES
1. Have you been to the ER, urgent care clinic since your last visit? Hospitalized since your last visit? no    2. Have you seen or consulted any other health care providers outside of the [address] [place] [address] since your last visit? Include any pap smears or colon screening.  no

## 2021-06-02 NOTE — PATIENT INSTRUCTIONS
Gallbladder Removal Surgery: What to Expect at [facility] Your Recovery After your surgery, you will likely feel weak and tired for several days after you return home. Your belly may be swollen. If you had laparoscopic surgery, you may also have pain in your shoulder for about 24 hours. You may have gas or need to burp a lot at first. A few people get diarrhea. The diarrhea usually goes away in 2 to 4 weeks, but it may last longer. How quickly you recover depends on whether you had a laparoscopic or open surgery. · For a laparoscopic surgery, most people can go back to work or their normal routine in 1 to 2 weeks. But it may take longer, depending on the type of work you do. · For an open surgery, it will probably take 4 to 6 weeks before you get back to your normal routine. This care sheet gives you a general idea about how long it will take for you to recover. However, each person recovers at a different pace. Follow the steps below to get better as quickly as possible. How can you care for yourself at home? Activity 
  · Rest when you feel tired. Getting enough sleep will help you recover.  
  · Try to walk each day. Start out by walking a little more than you did the day before. Gradually increase the amount you walk. Walking boosts blood flow and helps prevent pneumonia and constipation.  
  · For about 2 to 4 weeks, avoid lifting anything that would make you strain. This may include a child, heavy grocery bags and milk containers, a heavy briefcase or backpack, cat litter or dog food bags, or a vacuum .  
  · Avoid strenuous activities, such as biking, jogging, weightlifting, and aerobic exercise, until your doctor says it is okay.  
  · You may shower 24 to 48 hours after surgery, if your doctor okays it. Pat the cut (incision) dry.  Do not take a bath for the first 2 weeks, or until your doctor tells you it is okay.  
  · You may drive when you are no longer taking pain medicine and can quickly move your foot from the gas pedal to the brake. You must also be able to sit comfortably for a long period of time, even if you do not plan to go far. You might get caught in traffic.  
  · For a laparoscopic surgery, most people can go back to work or their normal routine in 1 to 2 weeks, but it may take longer. For an open surgery, it will probably take 4 to 6 weeks before you get back to your normal routine.  
  · Your doctor will tell you when you can have sex again. Diet 
  · Eat smaller meals more often instead of fewer larger meals. You can eat a normal diet, but avoid eating fatty foods for about 1 month. Fatty foods include hamburger, whole milk, cheese, and many snack foods. If your stomach is upset, try bland, low-fat foods like plain rice, broiled chicken, toast, and yogurt.  
  · Drink plenty of fluids (unless your doctor tells you not to).   · If you have diarrhea, try avoiding spicy foods, dairy products, fatty foods, and alcohol. You can also watch to see if specific foods cause it, and stop eating them. If the diarrhea continues for more than 2 weeks, talk to your doctor.  
  · You may notice that your bowel movements are not regular right after your surgery. This is common. Try to avoid constipation and straining with bowel movements. You may want to take a fiber supplement every day. If you have not had a bowel movement after a couple of days, ask your doctor about taking a mild laxative. Medicines 
  · Your doctor will tell you if and when you can restart your medicines. He or she will also give you instructions about taking any new medicines.  
  · If you take aspirin or some other blood thinner, ask your doctor if and when to start taking it again. Make sure that you understand exactly what your doctor wants you to do.  
  · Take pain medicines exactly as directed. ? If the doctor gave you a prescription medicine for pain, take it as prescribed.  
? If you are not taking a prescription pain medicine, take an over-the-counter medicine such as acetaminophen (Tylenol), ibuprofen (Advil, Motrin), or naproxen (Aleve). Read and follow all instructions on the label. ? Do not take two or more pain medicines at the same time unless the doctor told you to. Many pain medicines contain acetaminophen, which is Tylenol. Too much Tylenol can be harmful.  
  · If you think your pain medicine is making you sick to your stomach: 
? Take your medicine after meals (unless your doctor tells you not to). ? Ask your doctor for a different pain medicine.  
  · If your doctor prescribed antibiotics, take them as directed. Do not stop taking them just because you feel better. You need to take the full course of antibiotics. Incision care 
  · If you have strips of tape on the incision, or cut, leave the tape on for a week or until it falls off.  
  · After 24 to 48 hours, wash the area daily with warm, soapy water, and pat it dry.  
  · You may have staples to hold the cut together. Keep them dry until your doctor takes them out. This is usually in 7 to 10 days.  
  · Keep the area clean and dry. You may cover it with a gauze bandage if it weeps or rubs against clothing. Change the bandage every day. Ice 
  · To reduce swelling and pain, put ice or a cold pack on your belly for 10 to 20 minutes at a time. Do this every 1 to 2 hours. Put a thin cloth between the ice and your skin. Follow-up care is a key part of your treatment and safety. Be sure to make and go to all appointments, and call your doctor if you are having problems. It's also a good idea to know your test results and keep a list of the medicines you take. When should you call for help? Call 911 anytime you think you may need emergency care. For example, call if: 
  · You passed out (lost consciousness).  
  · You are short of breath. Cheri Walls Call your doctor now or seek immediate medical care if: 
  · You are sick to your stomach and cannot drink fluids.   · You have pain that does not get better when you take your pain medicine.  
  · You cannot pass stools or gas.  
  · You have signs of infection, such as: 
? Increased pain, swelling, warmth, or redness. ? Red streaks leading from the incision. ? Pus draining from the incision. ? A fever.  
  · Bright red blood has soaked through the bandage over your incision.  
  · You have loose stitches, or your incision comes open.  
  · You have signs of a blood clot in your leg (called a deep vein thrombosis), such as: 
? Pain in your calf, back of knee, thigh, or groin. ? Redness and swelling in your leg or groin. Watch closely for any changes in your health, and be sure to contact your doctor if you have any problems. Where can you learn more? Go to http://www.gray.com/ Enter [phone] in the search box to learn more about \"Gallbladder Removal Surgery: What to Expect at Home. \" Current as of: April 15, 2020               Content Version: 12.8 © 2006-2021 Healthwise, [facility]. Care instructions adapted under license by [employer] (which disclaims liability or warranty for this information). If you have questions about a medical condition or this instruction, always ask your healthcare professional. [place] [address] any warranty or liability for your use of this information.

## 2021-06-02 NOTE — PROGRESS NOTES
Subjective: Estevan Lipkrystina is a 77 y.o. female presents for postop care 13 days following laparoscopic cholecystectomy by Dr. Desiree Vargas. Appetite is good. Eating a regular diet. without difficulty. Bowel movements are regular. Pain is controlled without any medications. .Denies fever, nausea, redness at incision site, vomiting and diarrhea    Pathology:  Chronic cholecystitis with cholelithiasis    Objective:     Visit Vitals  /83   Pulse 77   Temp 98.5 °F (36.9 °C) (Oral)   Resp 16   Ht 5' 1\" (1.549 m)   Wt 189 lb (85.7 kg)   SpO2 97%   BMI 35.71 kg/m²       General:  alert, no distress   Abdomen: soft, bowel sounds active, non-tender   Incision:   healing well, no drainage, no erythema, no seroma, no swelling, no dehiscence, incisions well approximated   Heart: regular rate and rhythm, S1, S2 normal, no murmur, click, rub or gallop   Lungs: clear to auscultation bilaterally     Assessment:     1. Chronic cholecystitis with cholelithiasis, status post lap nita. Doing well postoperatively. Plan:     1. Pt is to increase activities as tolerated. Avoid fatty foods and heavy lifting for 2 weeks. 2. Follow-up: prn    Ms. Sea Aburto has a reminder for a \"due or due soon\" health maintenance. I have asked that she contact her primary care provider for follow-up on this health maintenance. Patient verbalized understanding and agreement.

## 2021-06-02 NOTE — LETTER
6/2/2021 Patient: Mary Lou Ray YOB: 1955 Date of Visit: 6/2/2021 Thierno Aguilar MD 
[address] [facility] [place] [facility] [id number] [address] [employer] Dear Thierno Aguilar MD, Thank you for referring Ms. Richie Dawson to [facility] for evaluation. My notes for this consultation are attached. If you have questions, please do not hesitate to call me. I look forward to following your patient along with you. Sincerely, Kristin Zaragoza NP

## 2021-06-07 NOTE — PROGRESS NOTES
Called patient and discussed lab results. Labs stable. Vit D a little low. Increase Vit d to 2000 international units  daily and recheck with next DM2 labs.

## 2021-06-28 DIAGNOSIS — E11.9 TYPE 2 DIABETES MELLITUS WITHOUT COMPLICATION, WITHOUT LONG-TERM CURRENT USE OF INSULIN (HCC): ICD-10-CM

## 2021-06-28 RX ORDER — METFORMIN HYDROCHLORIDE 500 MG/1
1500 TABLET, EXTENDED RELEASE ORAL
Qty: 270 TABLET | Refills: 0 | Status: SHIPPED | OUTPATIENT
Start: 2021-06-28 | End: 2021-09-20 | Stop reason: SDUPTHER

## 2021-07-06 DIAGNOSIS — E11.9 TYPE 2 DIABETES MELLITUS WITHOUT COMPLICATION, WITHOUT LONG-TERM CURRENT USE OF INSULIN (HCC): ICD-10-CM

## 2021-07-06 RX ORDER — ROSUVASTATIN CALCIUM 5 MG/1
5 TABLET, COATED ORAL
Qty: 90 TABLET | Refills: 0 | Status: SHIPPED | OUTPATIENT
Start: 2021-07-06 | End: 2021-09-28 | Stop reason: SDUPTHER

## 2021-07-06 NOTE — TELEPHONE ENCOUNTER
Last visit 04/22/2021 MD Virgie Mehta   Next appointment 08/25/2021 MD Virgie Mehta   Previous refill encounter(s)   01/15/2021 Crestor #[id number] with 1 refill     Requested Prescriptions     Pending Prescriptions Disp Refills    rosuvastatin (CRESTOR) 5 mg tablet 90 Tablet 0     Sig: Take 1 Tablet by mouth nightly.

## 2021-08-02 ENCOUNTER — PATIENT MESSAGE (OUTPATIENT)
Dept: FAMILY MEDICINE CLINIC | Age: 66
End: 2021-08-02

## 2021-08-02 DIAGNOSIS — E11.9 TYPE 2 DIABETES MELLITUS WITHOUT COMPLICATION, WITHOUT LONG-TERM CURRENT USE OF INSULIN (HCC): Primary | ICD-10-CM

## 2021-08-02 DIAGNOSIS — E55.9 VITAMIN D DEFICIENCY: ICD-10-CM

## 2021-08-09 ENCOUNTER — APPOINTMENT (OUTPATIENT)
Dept: FAMILY MEDICINE CLINIC | Age: 66
End: 2021-08-09

## 2021-08-09 DIAGNOSIS — E55.9 VITAMIN D DEFICIENCY: ICD-10-CM

## 2021-08-09 DIAGNOSIS — E11.9 TYPE 2 DIABETES MELLITUS WITHOUT COMPLICATION, WITHOUT LONG-TERM CURRENT USE OF INSULIN (HCC): ICD-10-CM

## 2021-08-09 LAB
25(OH)D3 SERPL-MCNC: 33.6 NG/ML (ref 30–100)
ALBUMIN SERPL-MCNC: 3.7 G/DL (ref 3.5–5)
ALBUMIN/GLOB SERPL: 1.2 {RATIO} (ref 1.1–2.2)
ALP SERPL-CCNC: 91 U/L (ref 45–117)
ALT SERPL-CCNC: 14 U/L (ref 12–78)
ANION GAP SERPL CALC-SCNC: 3 MMOL/L (ref 5–15)
AST SERPL-CCNC: 12 U/L (ref 15–37)
BILIRUB SERPL-MCNC: 0.4 MG/DL (ref 0.2–1)
BUN SERPL-MCNC: 13 MG/DL (ref 6–20)
BUN/CREAT SERPL: 20 (ref 12–20)
CALCIUM SERPL-MCNC: 9.5 MG/DL (ref 8.5–10.1)
CHLORIDE SERPL-SCNC: 108 MMOL/L (ref 97–108)
CO2 SERPL-SCNC: 29 MMOL/L (ref 21–32)
CREAT SERPL-MCNC: 0.66 MG/DL (ref 0.55–1.02)
ERYTHROCYTE [DISTWIDTH] IN BLOOD BY AUTOMATED COUNT: 13.2 % (ref 11.5–14.5)
EST. AVERAGE GLUCOSE BLD GHB EST-MCNC: 143 MG/DL
GLOBULIN SER CALC-MCNC: 3.2 G/DL (ref 2–4)
GLUCOSE SERPL-MCNC: 130 MG/DL (ref 65–100)
HBA1C MFR BLD: 6.6 % (ref 4–5.6)
HCT VFR BLD AUTO: 41.2 % (ref 35–47)
HGB BLD-MCNC: 13 G/DL (ref 11.5–16)
MCH RBC QN AUTO: 29.6 PG (ref 26–34)
MCHC RBC AUTO-ENTMCNC: 31.6 G/DL (ref 30–36.5)
MCV RBC AUTO: 93.8 FL (ref 80–99)
NRBC # BLD: 0 K/UL (ref 0–0.01)
NRBC BLD-RTO: 0 PER 100 WBC
PLATELET # BLD AUTO: 206 K/UL (ref 150–400)
PMV BLD AUTO: 11.1 FL (ref 8.9–12.9)
POTASSIUM SERPL-SCNC: 4.7 MMOL/L (ref 3.5–5.1)
PROT SERPL-MCNC: 6.9 G/DL (ref 6.4–8.2)
RBC # BLD AUTO: 4.39 M/UL (ref 3.8–5.2)
SODIUM SERPL-SCNC: 140 MMOL/L (ref 136–145)
WBC # BLD AUTO: 7 K/UL (ref 3.6–11)

## 2021-08-09 NOTE — TELEPHONE ENCOUNTER
ICD-10-CM ICD-9-CM    1. Type 2 diabetes mellitus without complication, without long-term current use of insulin (HCC)  E11.9 250.00 HEMOGLOBIN A1C WITH EAG      METABOLIC PANEL, COMPREHENSIVE      CBC W/O DIFF   2.  Vitamin D deficiency  E55.9 268.9 VITAMIN D, 25 HYDROXY     Heather Roque MD

## 2021-08-25 ENCOUNTER — OFFICE VISIT (OUTPATIENT)
Dept: FAMILY MEDICINE CLINIC | Age: 66
End: 2021-08-25
Payer: MEDICARE

## 2021-08-25 VITALS
DIASTOLIC BLOOD PRESSURE: 86 MMHG | SYSTOLIC BLOOD PRESSURE: 138 MMHG | HEIGHT: 61 IN | TEMPERATURE: 98.5 F | RESPIRATION RATE: 16 BRPM | WEIGHT: 195 LBS | OXYGEN SATURATION: 100 % | BODY MASS INDEX: 36.82 KG/M2 | HEART RATE: 83 BPM

## 2021-08-25 DIAGNOSIS — H25.13 AGE-RELATED NUCLEAR CATARACT OF BOTH EYES: Chronic | ICD-10-CM

## 2021-08-25 DIAGNOSIS — Z13.39 SCREENING FOR ALCOHOLISM: ICD-10-CM

## 2021-08-25 DIAGNOSIS — Z00.00 MEDICARE ANNUAL WELLNESS VISIT, SUBSEQUENT: Primary | ICD-10-CM

## 2021-08-25 DIAGNOSIS — E78.49 OTHER HYPERLIPIDEMIA: ICD-10-CM

## 2021-08-25 DIAGNOSIS — E11.9 TYPE 2 DIABETES MELLITUS WITHOUT COMPLICATION, WITHOUT LONG-TERM CURRENT USE OF INSULIN (HCC): ICD-10-CM

## 2021-08-25 DIAGNOSIS — E11.3313 MODERATE NONPROLIFERATIVE DIABETIC RETINOPATHY OF BOTH EYES WITH MACULAR EDEMA ASSOCIATED WITH TYPE 2 DIABETES MELLITUS (HCC): ICD-10-CM

## 2021-08-25 DIAGNOSIS — Z13.31 SCREENING FOR DEPRESSION: ICD-10-CM

## 2021-08-25 DIAGNOSIS — Z12.31 ENCOUNTER FOR SCREENING MAMMOGRAM FOR MALIGNANT NEOPLASM OF BREAST: ICD-10-CM

## 2021-08-25 DIAGNOSIS — Z78.0 POST-MENOPAUSAL: ICD-10-CM

## 2021-08-25 PROCEDURE — 1101F PT FALLS ASSESS-DOCD LE1/YR: CPT | Performed by: FAMILY MEDICINE

## 2021-08-25 PROCEDURE — G8399 PT W/DXA RESULTS DOCUMENT: HCPCS | Performed by: FAMILY MEDICINE

## 2021-08-25 PROCEDURE — G8536 NO DOC ELDER MAL SCRN: HCPCS | Performed by: FAMILY MEDICINE

## 2021-08-25 PROCEDURE — 2022F DILAT RTA XM EVC RTNOPTHY: CPT | Performed by: FAMILY MEDICINE

## 2021-08-25 PROCEDURE — 3017F COLORECTAL CA SCREEN DOC REV: CPT | Performed by: FAMILY MEDICINE

## 2021-08-25 PROCEDURE — 3044F HG A1C LEVEL LT 7.0%: CPT | Performed by: FAMILY MEDICINE

## 2021-08-25 PROCEDURE — G8427 DOCREV CUR MEDS BY ELIG CLIN: HCPCS | Performed by: FAMILY MEDICINE

## 2021-08-25 PROCEDURE — G0439 PPPS, SUBSEQ VISIT: HCPCS | Performed by: FAMILY MEDICINE

## 2021-08-25 PROCEDURE — G8417 CALC BMI ABV UP PARAM F/U: HCPCS | Performed by: FAMILY MEDICINE

## 2021-08-25 PROCEDURE — G0442 ANNUAL ALCOHOL SCREEN 15 MIN: HCPCS | Performed by: FAMILY MEDICINE

## 2021-08-25 PROCEDURE — G8510 SCR DEP NEG, NO PLAN REQD: HCPCS | Performed by: FAMILY MEDICINE

## 2021-08-25 PROCEDURE — G9899 SCRN MAM PERF RSLTS DOC: HCPCS | Performed by: FAMILY MEDICINE

## 2021-08-25 NOTE — PROGRESS NOTES
This is the Subsequent Medicare Annual Wellness Exam, performed 12 months or more after the Initial AWV or the last Subsequent AWV    · She reports that the abdominal pain has resolved since her surgery. She does reports that she has some loose stools with certain foods. She is keep a diary to identify foods that cause this so she can avoid them. · DM2: She reports that she has not been on her typical diabetic diet d/t the cholcystectomy. She reports that she is tolerating metformin well. Recent A1c was 6.6 on 8/9/21. · HLD: She reports that she is taking Crestor with no muscle aches or other concerns. Recent lipids on 4/29/21 Chol 172, HDL 72, LDL 82.   · Now followed by retinal specialist.   · Colonoscopy: she reports having colonoscopy with Dr. Smith Granger in 2019 with recommendation for 5 year repeat    I have reviewed the patient's medical history in detail and updated the computerized patient record. Assessment/Plan   Education and counseling provided:  Are appropriate based on today's review and evaluation      ICD-10-CM ICD-9-CM    1. Medicare annual wellness visit, subsequent  Z00.00 V70.0    2. Post-menopausal  Z78.0 V49.81 DEXA BONE DENSITY STUDY AXIAL   3. Encounter for screening mammogram for malignant neoplasm of breast  Z12.31 V76.12 MICHELINE MAMMO BI SCREENING INCL CAD   4. Screening for alcoholism  Z13.39 V79.1 MS ANNUAL ALCOHOL SCREEN 15 MIN   5. Screening for depression  Z13.31 V79.0 DEPRESSION SCREEN ANNUAL   6. Type 2 diabetes mellitus without complication, without long-term current use of insulin (HCC)  E11.9 250.00    7. Other hyperlipidemia  E78.49 272.4    8. Moderate nonproliferative diabetic retinopathy of both eyes with macular edema associated with type 2 diabetes mellitus ([facility] [address]  E11.3313 250.50      362.07      362.05    9.  Age-related nuclear cataract of both eyes  H25.13 366.16      · Medicare Wellness Exam: Reviewed and addressed patient's medical history and concerns as discussed in note. Reviewed recommended screenings and immunizations. Discussed recommendations for diet, exercise, and lifestyle.  All other conditions listed above: chronic and stable. Will continue current treatment regimen. Reviewed recent labs with patient. . Discussed following up with specialist as scheduled. Discussed recommendations for diet and exercise. Depression Risk Factor Screening     3 most recent PHQ Screens 8/25/2021   Little interest or pleasure in doing things Not at all   Feeling down, depressed, irritable, or hopeless Not at all   Total Score PHQ 2 0       Alcohol Risk Screen    Do you average more than 1 drink per night or more than 7 drinks a week:  No    On any one occasion in the past three months have you have had more than 3 drinks containing alcohol:  No        Functional Ability and Level of Safety    Hearing: Hearing is good. Activities of Daily Living: The home contains: handrails  Patient does total self care      Ambulation: with no difficulty     Fall Risk:  Fall Risk Assessment, last 12 mths 6/2/2021   Able to walk? Yes   Fall in past 12 months? 1   Do you feel unsteady? 0   Are you worried about falling 0   Is TUG test greater than 12 seconds? -   Is the gait abnormal? 0   Number of falls in past 12 months 1   Fall with injury?  1      Abuse Screen:  Patient is not abused       Cognitive Screening    Has your family/caregiver stated any concerns about your memory: no     Cognitive Screening: normal    Health Maintenance Due     Health Maintenance Due   Topic Date Due    Bone Densitometry (Dexa) Screening  03/07/2020    Colorectal Cancer Screening Combo  04/30/2021    Foot Exam Q1  07/31/2021    Breast Cancer Screen Mammogram  08/13/2021       Patient Care Team   Patient Care Team:  Lizbet Neely MD as PCP - General (Family Medicine)  Lizbet Neely MD as PCP - [facility] Empaneled Provider  Clement Carrion MD (Obstetrics & Gynecology)  Qian Foote MD (Colon and Rectal Surgery)  Rafaela Cardozo MD as Physician (Dermatology)  Mariya Gutierrez NP (General Surgery)    History     Patient Active Problem List   Diagnosis Code    Type II diabetes mellitus ([facility] [address] E11.9    History of basal cell carcinoma Z85.828    History of colonoscopy Z98.890    Diverticulosis K57.90    Actinic keratosis L57.0    Vitamin D insufficiency E55.9    Other hyperlipidemia E78.49    Severe obesity ([address] E66.01    H/O: hysterectomy Z90.710    Moderate nonproliferative diabetic retinopathy of both eyes with macular edema associated with type 2 diabetes mellitus ([facility] [address] J63.2503    Age-related nuclear cataract of both eyes H25.13     Past Medical History:   Diagnosis Date    Calculus of kidney     Diabetes ([facility] [address]     Headache       Past Surgical History:   Procedure Laterality Date    HX CYST REMOVAL  1980    HX GYN  2003    hysterectomy    HX LAP CHOLECYSTECTOMY  05/20/2021    by Dr. Francisco Sanchez at [address]     Current Outpatient Medications   Medication Sig Dispense Refill    rosuvastatin (CRESTOR) 5 mg tablet Take 1 Tablet by mouth nightly. 90 Tablet 0    metFORMIN ER (GLUCOPHAGE XR) 500 mg tablet Take 3 Tablets by mouth daily (with dinner). 270 Tablet 0    lancets misc Per insurance preference, test 1x/d dx diabetes E11.9 100 Each 3    glucose blood VI test strips (blood glucose test) strip Per insurance preference, test 1x/d dx diabetes E11.9 100 Strip 3    Blood-Glucose Meter monitoring kit Use to check fasting and meal time blood sugar daily. Patient may use whichever product is covered by insurance. 1 Kit 0    ondansetron (ZOFRAN ODT) 4 mg disintegrating tablet Take 1 Tablet by mouth every eight (8) hours as needed for Nausea or Vomiting. (Patient not taking: Reported on 8/25/2021) 20 Tablet 0    ibuprofen (MOTRIN) 800 mg tablet Take 1 Tablet by mouth every six (6) hours as needed for Pain.  (Patient not taking: Reported on 6/2/2021) 30 Tablet 0     Allergies   Allergen Reactions    Codeine Other (comments)     Passed out    Flomax [Tamsulosin] Other (comments)     presyncope    Tetanus And Diphther.  Tox (Pf) Swelling       Family History   Problem Relation Age of Onset    Gall Bladder Disease Mother     Cancer Father         colon, liver    Stroke Father         TIAs    Parkinson's Disease Father     Migraines Father      Social History     Tobacco Use    Smoking status: Never Smoker    Smokeless tobacco: Never Used   Substance Use Topics    Alcohol use: No         Kristine Rodriguez MD

## 2021-08-25 NOTE — PROGRESS NOTES
Chief Complaint   Patient presents with    Complete Physical     1. Have you been to the ER, urgent care clinic since your last visit? Hospitalized since your last visit? No    2. Have you seen or consulted any other health care providers outside of the [address] [place] [address] since your last visit? Include any pap smears or colon screening.  No

## 2021-08-25 NOTE — PATIENT INSTRUCTIONS
Medicare Wellness Visit, Female     The best way to live healthy is to have a lifestyle where you eat a well-balanced diet, exercise regularly, limit alcohol use, and quit all forms of tobacco/nicotine, if applicable. Regular preventive services are another way to keep healthy. Preventive services (vaccines, screening tests, monitoring & exams) can help personalize your care plan, which helps you manage your own care. Screening tests can find health problems at the earliest stages, when they are easiest to treat. [facility] follows the current, evidence-based guidelines published by the [facility] Sukhdev Calderon ([facility]) when recommending preventive services for our patients. Because we follow these guidelines, sometimes recommendations change over time as research supports it. (For example, mammograms used to be recommended annually. Even though Medicare will still pay for an annual mammogram, the newer guidelines recommend a mammogram every two years for women of average risk). Of course, you and your doctor may decide to screen more often for some diseases, based on your risk and your co-morbidities (chronic disease you are already diagnosed with). Preventive services for you include:  - Medicare offers their members a free annual wellness visit, which is time for you and your primary care provider to discuss and plan for your preventive service needs. Take advantage of this benefit every year!  -All adults over the age of 72 should receive the recommended pneumonia vaccines. Current USPSTF guidelines recommend a series of two vaccines for the best pneumonia protection.   -All adults should have a flu vaccine yearly and a tetanus vaccine every 10 years.   -All adults age 48 and older should receive the shingles vaccines (series of two vaccines).       -All adults age 38-68 who are overweight should have a diabetes screening test once every three years.   -All adults born between [birth year] and 1965 should be screened once for Hepatitis C.  -Other screening tests and preventive services for persons with diabetes include: an eye exam to screen for diabetic retinopathy, a kidney function test, a foot exam, and stricter control over your cholesterol.   -Cardiovascular screening for adults with routine risk involves an electrocardiogram (ECG) at intervals determined by your doctor.   -Colorectal cancer screenings should be done for adults age 54-65 with no increased risk factors for colorectal cancer. There are a number of acceptable methods of screening for this type of cancer. Each test has its own benefits and drawbacks. Discuss with your doctor what is most appropriate for you during your annual wellness visit. The different tests include: colonoscopy (considered the best screening method), a fecal occult blood test, a fecal DNA test, and sigmoidoscopy.    -A bone mass density test is recommended when a woman turns 65 to screen for osteoporosis. This test is only recommended one time, as a screening. Some providers will use this same test as a disease monitoring tool if you already have osteoporosis. -Breast cancer screenings are recommended every other year for women of normal risk, age 54-69.  -Cervical cancer screenings for women over age 72 are only recommended with certain risk factors.      Here is a list of your current Health Maintenance items (your personalized list of preventive services) with a due date:  Health Maintenance Due   Topic Date Due    Bone Mineral Density   03/07/2020    Colorectal Screening  04/30/2021    Diabetic Foot Care  07/31/2021    Mammogram  08/13/2021

## 2021-09-20 DIAGNOSIS — E11.9 TYPE 2 DIABETES MELLITUS WITHOUT COMPLICATION, WITHOUT LONG-TERM CURRENT USE OF INSULIN (HCC): ICD-10-CM

## 2021-09-20 RX ORDER — METFORMIN HYDROCHLORIDE 500 MG/1
1500 TABLET, EXTENDED RELEASE ORAL
Qty: 270 TABLET | Refills: 1 | Status: SHIPPED | OUTPATIENT
Start: 2021-09-20 | End: 2022-03-16 | Stop reason: SDUPTHER

## 2021-09-20 NOTE — TELEPHONE ENCOUNTER
Last Visit: 8/25/21 MD Haley Durand  Next Appointment: 12/1/21 MD Haley Durand  Previous Refill Encounter(s): 6/28/21 270    Requested Prescriptions     Pending Prescriptions Disp Refills    metFORMIN ER (GLUCOPHAGE XR) 500 mg tablet 270 Tablet 1     Sig: Take 3 Tablets by mouth daily (with dinner).

## 2021-09-22 DIAGNOSIS — E11.9 TYPE 2 DIABETES MELLITUS WITHOUT COMPLICATION, WITHOUT LONG-TERM CURRENT USE OF INSULIN (HCC): ICD-10-CM

## 2021-09-22 RX ORDER — LANCETS
EACH MISCELLANEOUS
Qty: 100 EACH | Refills: 3 | Status: SHIPPED | OUTPATIENT
Start: 2021-09-22 | End: 2022-01-06 | Stop reason: SDUPTHER

## 2021-09-22 NOTE — TELEPHONE ENCOUNTER
Last Visit: 8/25/21 MD Sintia Bhatt  Next Appointment: 12/1/21 MD Sintia Bhatt  Previous Refill Encounter(s): 1/7/21 100 + 3    Requested Prescriptions     Pending Prescriptions Disp Refills    lancets misc 100 Each 3     Sig: Per insurance preference, test 1x/d dx diabetes E11.9

## 2021-09-23 ENCOUNTER — HOSPITAL ENCOUNTER (OUTPATIENT)
Dept: MAMMOGRAPHY | Age: 66
Discharge: HOME | End: 2021-09-23
Attending: FAMILY MEDICINE
Payer: MEDICARE

## 2021-09-23 DIAGNOSIS — Z12.31 ENCOUNTER FOR SCREENING MAMMOGRAM FOR MALIGNANT NEOPLASM OF BREAST: ICD-10-CM

## 2021-09-23 DIAGNOSIS — Z78.0 POST-MENOPAUSAL: ICD-10-CM

## 2021-09-23 PROCEDURE — 77080 DXA BONE DENSITY AXIAL: CPT

## 2021-09-23 PROCEDURE — 77063 BREAST TOMOSYNTHESIS BI: CPT

## 2021-09-28 DIAGNOSIS — E11.9 TYPE 2 DIABETES MELLITUS WITHOUT COMPLICATION, WITHOUT LONG-TERM CURRENT USE OF INSULIN (HCC): ICD-10-CM

## 2021-09-28 RX ORDER — ROSUVASTATIN CALCIUM 5 MG/1
5 TABLET, COATED ORAL
Qty: 90 TABLET | Refills: 2 | Status: SHIPPED | OUTPATIENT
Start: 2021-09-28 | End: 2022-06-20 | Stop reason: SDUPTHER

## 2021-09-28 NOTE — TELEPHONE ENCOUNTER
Last Visit: 8/25/21 MD Rianna Farias, lipid 4/2021  Next Appointment: 12/1/21 MD Rianna Farias  Previous Refill Encounter(s): 7/6/21 90    Requested Prescriptions     Pending Prescriptions Disp Refills    rosuvastatin (CRESTOR) 5 mg tablet 90 Tablet 2     Sig: Take 1 Tablet by mouth nightly.

## 2021-12-01 ENCOUNTER — OFFICE VISIT (OUTPATIENT)
Dept: FAMILY MEDICINE CLINIC | Age: 66
End: 2021-12-01
Payer: MEDICARE

## 2021-12-01 VITALS
OXYGEN SATURATION: 100 % | HEART RATE: 72 BPM | SYSTOLIC BLOOD PRESSURE: 156 MMHG | DIASTOLIC BLOOD PRESSURE: 88 MMHG | HEIGHT: 61 IN | WEIGHT: 198.6 LBS | RESPIRATION RATE: 16 BRPM | BODY MASS INDEX: 37.49 KG/M2

## 2021-12-01 DIAGNOSIS — E78.49 OTHER HYPERLIPIDEMIA: ICD-10-CM

## 2021-12-01 DIAGNOSIS — I10 PRIMARY HYPERTENSION: Primary | ICD-10-CM

## 2021-12-01 DIAGNOSIS — E11.3313 MODERATE NONPROLIFERATIVE DIABETIC RETINOPATHY OF BOTH EYES WITH MACULAR EDEMA ASSOCIATED WITH TYPE 2 DIABETES MELLITUS (HCC): ICD-10-CM

## 2021-12-01 DIAGNOSIS — H25.13 AGE-RELATED NUCLEAR CATARACT OF BOTH EYES: Chronic | ICD-10-CM

## 2021-12-01 DIAGNOSIS — E11.9 TYPE 2 DIABETES MELLITUS WITHOUT COMPLICATION, WITHOUT LONG-TERM CURRENT USE OF INSULIN (HCC): ICD-10-CM

## 2021-12-01 PROCEDURE — 2022F DILAT RTA XM EVC RTNOPTHY: CPT | Performed by: FAMILY MEDICINE

## 2021-12-01 PROCEDURE — G8432 DEP SCR NOT DOC, RNG: HCPCS | Performed by: FAMILY MEDICINE

## 2021-12-01 PROCEDURE — 99214 OFFICE O/P EST MOD 30 MIN: CPT | Performed by: FAMILY MEDICINE

## 2021-12-01 PROCEDURE — 1101F PT FALLS ASSESS-DOCD LE1/YR: CPT | Performed by: FAMILY MEDICINE

## 2021-12-01 PROCEDURE — G9899 SCRN MAM PERF RSLTS DOC: HCPCS | Performed by: FAMILY MEDICINE

## 2021-12-01 PROCEDURE — G8399 PT W/DXA RESULTS DOCUMENT: HCPCS | Performed by: FAMILY MEDICINE

## 2021-12-01 PROCEDURE — G0463 HOSPITAL OUTPT CLINIC VISIT: HCPCS | Performed by: FAMILY MEDICINE

## 2021-12-01 PROCEDURE — 3017F COLORECTAL CA SCREEN DOC REV: CPT | Performed by: FAMILY MEDICINE

## 2021-12-01 PROCEDURE — G8417 CALC BMI ABV UP PARAM F/U: HCPCS | Performed by: FAMILY MEDICINE

## 2021-12-01 PROCEDURE — 3044F HG A1C LEVEL LT 7.0%: CPT | Performed by: FAMILY MEDICINE

## 2021-12-01 PROCEDURE — 1090F PRES/ABSN URINE INCON ASSESS: CPT | Performed by: FAMILY MEDICINE

## 2021-12-01 PROCEDURE — G8536 NO DOC ELDER MAL SCRN: HCPCS | Performed by: FAMILY MEDICINE

## 2021-12-01 PROCEDURE — G8427 DOCREV CUR MEDS BY ELIG CLIN: HCPCS | Performed by: FAMILY MEDICINE

## 2021-12-01 RX ORDER — LISINOPRIL 10 MG/1
10 TABLET ORAL DAILY
Qty: 90 TABLET | Refills: 0 | Status: SHIPPED | OUTPATIENT
Start: 2021-12-01 | End: 2022-01-11

## 2021-12-01 NOTE — PROGRESS NOTES
Bernardino Broderick  77 y.o. female  1955  Keerthi Barlow  [id number]     [address] [facility]       Encounter Date: 12/1/2021           Established Patient Visit Note: Marycarmen Ordoñez MD    Reason for Appointment:  Chief Complaint   Patient presents with    Follow-up       History of Present Illness:  History provided by patient    Bernardino Broderick is a 77 y.o. female who presents to clinic today for:      DM2: working on portion control; last A1c was 6.6 on 8/9/21. Average blood sugar at home has been 127. Tolerating metformin well. HLD: tolerating Crestor with no side effects  Osteopenia: she had DEXA on 9/23/21. She is taking calcium and vitamin d  Cataract and Retinal Abnormality: Ophthalmologist: [facility] (Dr. Mayi Díaz) is eye doctor, and retinal specialist is Dr. Elva Larose    Followed by dermatology (Dr. Maria A Escobar)    Review of Systems  Review of Systems   Constitutional: Negative for chills and fever. Respiratory: Negative for cough, shortness of breath and wheezing. Cardiovascular: Negative for chest pain and palpitations. Allergies: Codeine, Flomax [tamsulosin], and Tetanus and diphther. tox (pf)    Medications: (Updated to reflect final medication list after visit)    Current Outpatient Medications:     lisinopriL (PRINIVIL, ZESTRIL) 10 mg tablet, Take 1 Tablet by mouth daily. , Disp: 90 Tablet, Rfl: 0    rosuvastatin (CRESTOR) 5 mg tablet, Take 1 Tablet by mouth nightly., Disp: 90 Tablet, Rfl: 2    lancets misc, Per insurance preference, test 1x/d dx diabetes E11.9, Disp: 100 Each, Rfl: 3    metFORMIN ER (GLUCOPHAGE XR) 500 mg tablet, Take 3 Tablets by mouth daily (with dinner). , Disp: 270 Tablet, Rfl: 1    glucose blood VI test strips (blood glucose test) strip, Per insurance preference, test 1x/d dx diabetes E11.9, Disp: 100 Strip, Rfl: 3    Blood-Glucose Meter monitoring kit, Use to check fasting and meal time blood sugar daily. Patient may use whichever product is covered by insurance., Disp: 1 Kit, Rfl: 0    ibuprofen (MOTRIN) 800 mg tablet, Take 1 Tablet by mouth every six (6) hours as needed for Pain. (Patient not taking: Reported on 6/2/2021), Disp: 30 Tablet, Rfl: 0    History  Patient Care Team:  Frandy Leos MD as PCP - General (Family Medicine)  Frandy Leos MD as PCP - [address] [place] [address] Provider  Laney Isaacs MD (Obstetrics & Gynecology)  Ketty Self MD (Colon and Rectal Surgery)  Kaitlin Bowman MD as Physician (Dermatology)  Carin Mccullough NP (General Surgery)    Past Medical History: she has a past medical history of Calculus of kidney, Diabetes ([address] and Headache. Past Surgical History: she has a past surgical history that includes pr breast surgery procedure unlisted (1999); hx gyn (2003); hx cyst removal (1980); hx lap cholecystectomy (05/20/2021); hx breast biopsy (Right); and hx cyst incision and drainage (Bilateral). Family Medical History: family history includes Cancer in her father; [place] Balderas in her mother; Migraines in her father; Parkinson's Disease in her father; Stroke in her father. Social History: she reports that she has never smoked. She has never used smokeless tobacco. She reports that she does not drink alcohol and does not use drugs. Health Maintenance Due   Topic Date Due    Foot Exam Q1  07/31/2021    COVID-19 Vaccine (3 - Booster for Pfizer series) 09/27/2021       Objective:   Visit Vitals  BP (!) 156/88   Pulse 72   Resp 16   Ht 5' 1\" (1.549 m)   Wt 198 lb 9.6 oz (90.1 kg)   SpO2 100%   BMI 37.53 kg/m²     Wt Readings from Last 3 Encounters:   12/01/21 198 lb 9.6 oz (90.1 kg)   08/25/21 195 lb (88.5 kg)   06/02/21 189 lb (85.7 kg)       Physical Exam  Vitals and nursing note reviewed. Constitutional:       General: She is not in acute distress. Appearance: Normal appearance. HENT:      Head: Normocephalic and atraumatic.       Nose: Nose normal. Mouth/Throat:      Mouth: Mucous membranes are moist.   Eyes:      Extraocular Movements: Extraocular movements intact. Conjunctiva/sclera: Conjunctivae normal.      Pupils: Pupils are equal, round, and reactive to light. Cardiovascular:      Rate and Rhythm: Normal rate and regular rhythm. Pulses: Normal pulses. Heart sounds: Normal heart sounds. No murmur heard. No friction rub. No gallop. Pulmonary:      Effort: Pulmonary effort is normal. No respiratory distress. Breath sounds: Normal breath sounds. No wheezing, rhonchi or rales. Musculoskeletal:         General: Normal range of motion. Cervical back: Normal range of motion and neck supple. No rigidity. No muscular tenderness. Lymphadenopathy:      Cervical: No cervical adenopathy. Skin:     General: Skin is warm. Coloration: Skin is not jaundiced. Neurological:      General: No focal deficit present. Mental Status: She is alert. Mental status is at baseline. Cranial Nerves: Cranial nerves are intact. Motor: Motor function is intact. Coordination: Coordination is intact. Psychiatric:         Mood and Affect: Mood normal.         Behavior: Behavior normal.         Thought Content: Thought content normal.         Judgment: Judgment normal.         Assessment & Plan:      ICD-10-CM ICD-9-CM    1. Primary hypertension  I10 401.9 lisinopriL (PRINIVIL, ZESTRIL) 10 mg tablet   2. Type 2 diabetes mellitus without complication, without long-term current use of insulin ([facility])  E11.9 250.00 HEMOGLOBIN A1C WITH EAG      METABOLIC PANEL, COMPREHENSIVE      CBC W/O DIFF      URINALYSIS W/ REFLEX CULTURE      URINALYSIS W/ REFLEX CULTURE      CBC W/O DIFF      METABOLIC PANEL, COMPREHENSIVE      HEMOGLOBIN A1C WITH EAG   3. Moderate nonproliferative diabetic retinopathy of both eyes with macular edema associated with type 2 diabetes mellitus ([facility] [address]  E11.3313 250.50      362.07      362.05    4.  Age-related nuclear cataract of both eyes  H25.13 366.16    5. Other hyperlipidemia  E78.49 272.4        · HTN: New problem, uncontrolled. discussed medication options and associated risks/benefits/side effects/precautions; patient would like to try Lisinopril. Will start and RTC 4 weeks to recheck.  All other conditions listed above: chronic and stable. Will continue current treatment regimen. Will check labs as reflected above. Discussed following up with specialist as scheduled. Discussed recommendations for diet and exercise. I have discussed the diagnosis with the patient and the intended plan as seen in the above orders. The patient has received an after-visit summary along with patient information handout. I have discussed medication side effects and warnings with the patient as well. Disposition  Follow-up and Dispositions    · Return in about 4 weeks (around 12/29/2021) for blood pressure.            Angela Burrows MD

## 2021-12-01 NOTE — PROGRESS NOTES
Chief Complaint   Patient presents with    Follow-up        1. \"Have you been to the ER, urgent care clinic since your last visit? Hospitalized since your last visit? \" No    2. \"Have you seen or consulted any other health care providers outside of the [address] [place] [address] since your last visit? \" No     3. For patients aged 39-70: Has the patient had a colonoscopy? Yes, HM satisfied with blue hyperlink     If the patient is female:    4. For patients aged 41-77: Has the patient had a mammogram within the past 2 years? Yes, HM satisfied with blue hyperlink    5. For patients aged 21-30: Has the patient had a pap smear?  No    3 most recent PHQ Screens 8/25/2021   Little interest or pleasure in doing things Not at all   Feeling down, depressed, irritable, or hopeless Not at all   Total Score PHQ 2 0

## 2021-12-02 LAB
ALBUMIN SERPL-MCNC: 3.9 G/DL (ref 3.5–5)
ALBUMIN/GLOB SERPL: 1.2 {RATIO} (ref 1.1–2.2)
ALP SERPL-CCNC: 91 U/L (ref 45–117)
ALT SERPL-CCNC: 11 U/L (ref 12–78)
ANION GAP SERPL CALC-SCNC: 6 MMOL/L (ref 5–15)
APPEARANCE UR: CLEAR
AST SERPL-CCNC: 10 U/L (ref 15–37)
BACTERIA URNS QL MICRO: NEGATIVE /HPF
BILIRUB SERPL-MCNC: 0.5 MG/DL (ref 0.2–1)
BILIRUB UR QL: NEGATIVE
BUN SERPL-MCNC: 13 MG/DL (ref 6–20)
BUN/CREAT SERPL: 21 (ref 12–20)
CALCIUM SERPL-MCNC: 9.9 MG/DL (ref 8.5–10.1)
CHLORIDE SERPL-SCNC: 107 MMOL/L (ref 97–108)
CO2 SERPL-SCNC: 28 MMOL/L (ref 21–32)
COLOR UR: NORMAL
CREAT SERPL-MCNC: 0.62 MG/DL (ref 0.55–1.02)
EPITH CASTS URNS QL MICRO: NORMAL /LPF
ERYTHROCYTE [DISTWIDTH] IN BLOOD BY AUTOMATED COUNT: 12.7 % (ref 11.5–14.5)
EST. AVERAGE GLUCOSE BLD GHB EST-MCNC: 140 MG/DL
GLOBULIN SER CALC-MCNC: 3.3 G/DL (ref 2–4)
GLUCOSE SERPL-MCNC: 127 MG/DL (ref 65–100)
GLUCOSE UR STRIP.AUTO-MCNC: NEGATIVE MG/DL
HBA1C MFR BLD: 6.5 % (ref 4–5.6)
HCT VFR BLD AUTO: 44.1 % (ref 35–47)
HGB BLD-MCNC: 13.6 G/DL (ref 11.5–16)
HGB UR QL STRIP: NEGATIVE
HYALINE CASTS URNS QL MICRO: NORMAL /LPF (ref 0–5)
KETONES UR QL STRIP.AUTO: NEGATIVE MG/DL
LEUKOCYTE ESTERASE UR QL STRIP.AUTO: NEGATIVE
MCH RBC QN AUTO: 29.7 PG (ref 26–34)
MCHC RBC AUTO-ENTMCNC: 30.8 G/DL (ref 30–36.5)
MCV RBC AUTO: 96.3 FL (ref 80–99)
NITRITE UR QL STRIP.AUTO: NEGATIVE
NRBC # BLD: 0 K/UL (ref 0–0.01)
NRBC BLD-RTO: 0 PER 100 WBC
PH UR STRIP: 5.5 [PH] (ref 5–8)
PLATELET # BLD AUTO: 222 K/UL (ref 150–400)
PMV BLD AUTO: 11 FL (ref 8.9–12.9)
POTASSIUM SERPL-SCNC: 4.6 MMOL/L (ref 3.5–5.1)
PROT SERPL-MCNC: 7.2 G/DL (ref 6.4–8.2)
PROT UR STRIP-MCNC: NEGATIVE MG/DL
RBC # BLD AUTO: 4.58 M/UL (ref 3.8–5.2)
RBC #/AREA URNS HPF: NORMAL /HPF (ref 0–5)
SODIUM SERPL-SCNC: 141 MMOL/L (ref 136–145)
SP GR UR REFRACTOMETRY: 1.02 (ref 1–1.03)
UA: UC IF INDICATED,UAUC: NORMAL
UROBILINOGEN UR QL STRIP.AUTO: 0.2 EU/DL (ref 0.2–1)
WBC # BLD AUTO: 6.5 K/UL (ref 3.6–11)
WBC URNS QL MICRO: NORMAL /HPF (ref 0–4)

## 2021-12-15 ENCOUNTER — TELEPHONE (OUTPATIENT)
Dept: FAMILY MEDICINE CLINIC | Age: 66
End: 2021-12-15

## 2021-12-15 NOTE — TELEPHONE ENCOUNTER
Chief Complaint   Patient presents with    Medication Evaluation     Lisinopril 10 mg tablet     Other     Excessive saliva     Diarrhea     Spoke with patient 2 identifiers confirmed. Patient informed me that since she has been taking the Lisinopril 10 mg tablet she has been experiencing diarrhea, nausea ( 10 days ). Patient did not take the medication today 12/15/2021 and states \" I feel so much better . \"  The diarrhea is usually in the morning. Obtained blood pressures today to see how it was at 12:23 pm:  147/70, 124/70, 124/64. Patient states she is open to taking half tablet or whatever you may recommend.   Kenny Fiorens, LPN

## 2021-12-15 NOTE — TELEPHONE ENCOUNTER
Called patient. She reports that the lisinopril caused her to have nausea and diarrhea. She reports that she did not take the lisinopril today, and her symptoms have improved.  Discussed red flag symptoms and reasons to call or go to NANDINI Mckee MD

## 2021-12-15 NOTE — TELEPHONE ENCOUNTER
----- Message from Leela Dumont sent at 12/15/2021  8:50 AM EST -----  Subject: Medication Problem    QUESTIONS  Name of Medication? lisinopriL (PRINIVIL, ZESTRIL) 10 mg tablet  Patient-reported dosage and instructions? 10 mg tablet one tablet daily  What question or problem do you have with the medication? Having diarrhea   every morning since taking the Lisinopril. Excessive salivia. Would like   to talk to someone. Preferred Pharmacy? [id number] [address] phone number (if available)? [phone]  Additional Information for Provider?   ---------------------------------------------------------------------------  --------------  CALL BACK INFO  What is the best way for the office to contact you? OK to leave message on   voicemail  Preferred Call Back Phone Number? [phone]  ---------------------------------------------------------------------------  --------------  SCRIPT ANSWERS  Relationship to Patient?  Self

## 2022-01-06 DIAGNOSIS — E11.9 TYPE 2 DIABETES MELLITUS WITHOUT COMPLICATION, WITHOUT LONG-TERM CURRENT USE OF INSULIN (HCC): ICD-10-CM

## 2022-01-07 RX ORDER — LANCETS
EACH MISCELLANEOUS
Qty: 100 EACH | Refills: 3 | Status: SHIPPED | OUTPATIENT
Start: 2022-01-07

## 2022-01-11 ENCOUNTER — OFFICE VISIT (OUTPATIENT)
Dept: FAMILY MEDICINE CLINIC | Age: 67
End: 2022-01-11
Payer: MEDICARE

## 2022-01-11 VITALS
OXYGEN SATURATION: 97 % | BODY MASS INDEX: 35.68 KG/M2 | RESPIRATION RATE: 18 BRPM | SYSTOLIC BLOOD PRESSURE: 138 MMHG | TEMPERATURE: 97.5 F | HEART RATE: 90 BPM | WEIGHT: 189 LBS | DIASTOLIC BLOOD PRESSURE: 78 MMHG | HEIGHT: 61 IN

## 2022-01-11 DIAGNOSIS — E66.01 SEVERE OBESITY (BMI 35.0-35.9 WITH COMORBIDITY) (HCC): ICD-10-CM

## 2022-01-11 DIAGNOSIS — I10 PRIMARY HYPERTENSION: Primary | ICD-10-CM

## 2022-01-11 DIAGNOSIS — M85.80 OSTEOPENIA, UNSPECIFIED LOCATION: ICD-10-CM

## 2022-01-11 DIAGNOSIS — H25.13 AGE-RELATED NUCLEAR CATARACT OF BOTH EYES: ICD-10-CM

## 2022-01-11 DIAGNOSIS — E78.49 OTHER HYPERLIPIDEMIA: ICD-10-CM

## 2022-01-11 DIAGNOSIS — K30 INDIGESTION: ICD-10-CM

## 2022-01-11 DIAGNOSIS — Z85.828 HISTORY OF BASAL CELL CARCINOMA (BCC): ICD-10-CM

## 2022-01-11 DIAGNOSIS — E11.3313 MODERATE NONPROLIFERATIVE DIABETIC RETINOPATHY OF BOTH EYES WITH MACULAR EDEMA ASSOCIATED WITH TYPE 2 DIABETES MELLITUS (HCC): ICD-10-CM

## 2022-01-11 DIAGNOSIS — H26.9 CATARACT, UNSPECIFIED CATARACT TYPE, UNSPECIFIED LATERALITY: ICD-10-CM

## 2022-01-11 DIAGNOSIS — E11.9 TYPE 2 DIABETES MELLITUS WITHOUT COMPLICATION, WITHOUT LONG-TERM CURRENT USE OF INSULIN (HCC): ICD-10-CM

## 2022-01-11 PROCEDURE — 3017F COLORECTAL CA SCREEN DOC REV: CPT | Performed by: FAMILY MEDICINE

## 2022-01-11 PROCEDURE — G9899 SCRN MAM PERF RSLTS DOC: HCPCS | Performed by: FAMILY MEDICINE

## 2022-01-11 PROCEDURE — G8399 PT W/DXA RESULTS DOCUMENT: HCPCS | Performed by: FAMILY MEDICINE

## 2022-01-11 PROCEDURE — 2022F DILAT RTA XM EVC RTNOPTHY: CPT | Performed by: FAMILY MEDICINE

## 2022-01-11 PROCEDURE — G0463 HOSPITAL OUTPT CLINIC VISIT: HCPCS | Performed by: FAMILY MEDICINE

## 2022-01-11 PROCEDURE — G8752 SYS BP LESS 140: HCPCS | Performed by: FAMILY MEDICINE

## 2022-01-11 PROCEDURE — G8427 DOCREV CUR MEDS BY ELIG CLIN: HCPCS | Performed by: FAMILY MEDICINE

## 2022-01-11 PROCEDURE — G8754 DIAS BP LESS 90: HCPCS | Performed by: FAMILY MEDICINE

## 2022-01-11 PROCEDURE — 3046F HEMOGLOBIN A1C LEVEL >9.0%: CPT | Performed by: FAMILY MEDICINE

## 2022-01-11 PROCEDURE — G8417 CALC BMI ABV UP PARAM F/U: HCPCS | Performed by: FAMILY MEDICINE

## 2022-01-11 PROCEDURE — 99214 OFFICE O/P EST MOD 30 MIN: CPT | Performed by: FAMILY MEDICINE

## 2022-01-11 PROCEDURE — G8536 NO DOC ELDER MAL SCRN: HCPCS | Performed by: FAMILY MEDICINE

## 2022-01-11 PROCEDURE — 1090F PRES/ABSN URINE INCON ASSESS: CPT | Performed by: FAMILY MEDICINE

## 2022-01-11 PROCEDURE — G8510 SCR DEP NEG, NO PLAN REQD: HCPCS | Performed by: FAMILY MEDICINE

## 2022-01-11 PROCEDURE — 1101F PT FALLS ASSESS-DOCD LE1/YR: CPT | Performed by: FAMILY MEDICINE

## 2022-01-11 RX ORDER — LOSARTAN POTASSIUM 25 MG/1
25 TABLET ORAL DAILY
Qty: 90 TABLET | Refills: 1 | Status: SHIPPED | OUTPATIENT
Start: 2022-01-11 | End: 2022-04-11 | Stop reason: SDUPTHER

## 2022-01-11 RX ORDER — FAMOTIDINE 20 MG/1
20 TABLET, FILM COATED ORAL
Qty: 90 TABLET | Refills: 1 | Status: SHIPPED | OUTPATIENT
Start: 2022-01-11

## 2022-01-11 RX ORDER — LANCETS 33 GAUGE
EACH MISCELLANEOUS
COMMUNITY
Start: 2022-01-06

## 2022-01-11 NOTE — PROGRESS NOTES
Davin Haile  77 y.o. female  1955  Keerthi Barlow  [id number]     [address] [facility]       Encounter Date: 1/11/2022           Established Patient Visit Note: Kin Calderon MD    Reason for Appointment:  Chief Complaint   Patient presents with    Hypertension         History of Present Illness:  History provided by patient    Davin Haile is a 77 y.o. female who presents to clinic today for:     · HTN: patient was started on Lisinopril at last visit, but it was stopped d/t diarrhea. She reports that her home BPs have been 113/62 to 141/69. · Indigestion: she reports that she has noticed some increased indigestion recently. She reports that it is improved with taking antacids. She denies having reflux, but she does feel that it is better with antacids. She reports that she has had this since having her cholcystectomy. · DM2: last A1c was 6.5 on 12/1/21. Tolerating metformin well. She continues to work with dietician  · HLD: tolerating Crestor with no side effects  · Osteopenia: she had DEXA on 9/23/21 with Frax 1.6% HF and 10.4% MOF. She is taking calcium and vitamin d  · Cataract and Retinal Abnormality: Ophthalmologist: [facility] (Dr. Nish Dudley) is eye doctor, and retinal specialist is Dr. Amber Toney  ·  Hx of [facility]: Followed by dermatology (Dr. Mariel Sauceda) with next visit in Feb.       Review of Systems  Review of Systems   Constitutional: Negative for chills and fever. Respiratory: Negative for cough, shortness of breath and wheezing. Cardiovascular: Negative for chest pain and palpitations. Allergies: Codeine, Flomax [tamsulosin], Lisinopril, and Tetanus and diphther. tox (pf)    Medications:     Current Outpatient Medications:     famotidine (PEPCID) 20 mg tablet, Take 1 Tablet by mouth daily as needed for Heartburn., Disp: 90 Tablet, Rfl: 1    losartan (COZAAR) 25 mg tablet, Take 1 Tablet by mouth daily. , Disp: 90 Tablet, Rfl: 1   lancets misc, Per insurance preference, test 1x/d dx diabetes E11.9, Disp: 100 Each, Rfl: 3    rosuvastatin (CRESTOR) 5 mg tablet, Take 1 Tablet by mouth nightly., Disp: 90 Tablet, Rfl: 2    metFORMIN ER (GLUCOPHAGE XR) 500 mg tablet, Take 3 Tablets by mouth daily (with dinner). , Disp: 270 Tablet, Rfl: 1    ibuprofen (MOTRIN) 800 mg tablet, Take 1 Tablet by mouth every six (6) hours as needed for Pain., Disp: 30 Tablet, Rfl: 0    glucose blood VI test strips (blood glucose test) strip, Per insurance preference, test 1x/d dx diabetes E11.9, Disp: 100 Strip, Rfl: 3    Blood-Glucose Meter monitoring kit, Use to check fasting and meal time blood sugar daily. Patient may use whichever product is covered by insurance., Disp: 1 Kit, Rfl: 0    OneTouch Delica Plus Lancet 33 gauge misc, , Disp: , Rfl:     History  Patient Care Team:  Sebas Huffman MD as PCP - General (Family Medicine)  Sebas Huffman MD as PCP - [facility] Provider  Keysha Benson MD (Obstetrics & Gynecology)  Bryon Hobson MD (Colon and Rectal Surgery)  Ross Bauer MD as Physician (Dermatology)  Saran Patterson NP (General Surgery)    Past Medical History: she has a past medical history of Calculus of kidney, Diabetes ([address] and Headache. Past Surgical History: she has a past surgical history that includes pr breast surgery procedure unlisted (1999); hx gyn (2003); hx cyst removal (1980); hx lap cholecystectomy (05/20/2021); hx breast biopsy (Right); and hx cyst incision and drainage (Bilateral). Family Medical History: family history includes Cancer in her father; [place] Pall in her mother; Migraines in her father; Parkinson's Disease in her father; Stroke in her father. Social History: she reports that she has never smoked. She has never used smokeless tobacco. She reports that she does not drink alcohol and does not use drugs.         Objective:   Visit Vitals  /78 (BP 1 Location: Right upper arm, BP Patient Position: Sitting, BP Cuff Size: Large adult)   Pulse 90   Temp 97.5 °F (36.4 °C) (Temporal)   Resp 18   Ht 5' 1\" (1.549 m)   Wt 189 lb (85.7 kg)   SpO2 97%   BMI 35.71 kg/m²     Wt Readings from Last 3 Encounters:   01/11/22 189 lb (85.7 kg)   12/01/21 198 lb 9.6 oz (90.1 kg)   08/25/21 195 lb (88.5 kg)       Physical Exam  Vitals and nursing note reviewed. Constitutional:       General: She is not in acute distress. Appearance: Normal appearance. HENT:      Head: Normocephalic and atraumatic. Nose: Nose normal.      Mouth/Throat:      Mouth: Mucous membranes are moist.   Eyes:      Extraocular Movements: Extraocular movements intact. Conjunctiva/sclera: Conjunctivae normal.      Pupils: Pupils are equal, round, and reactive to light. Cardiovascular:      Rate and Rhythm: Normal rate and regular rhythm. Pulses: Normal pulses. Heart sounds: Normal heart sounds. No murmur heard. No friction rub. No gallop. Pulmonary:      Effort: Pulmonary effort is normal. No respiratory distress. Breath sounds: Normal breath sounds. No wheezing, rhonchi or rales. Musculoskeletal:         General: Normal range of motion. Cervical back: Normal range of motion and neck supple. No rigidity. No muscular tenderness. Lymphadenopathy:      Cervical: No cervical adenopathy. Skin:     General: Skin is warm. Coloration: Skin is not jaundiced. Neurological:      General: No focal deficit present. Mental Status: She is alert. Mental status is at baseline. Cranial Nerves: Cranial nerves are intact. Motor: Motor function is intact. Coordination: Coordination is intact. Psychiatric:         Mood and Affect: Mood normal.         Behavior: Behavior normal.         Thought Content: Thought content normal.         Judgment: Judgment normal.         Assessment & Plan:      ICD-10-CM ICD-9-CM    1. Primary hypertension  I10 401.9 losartan (COZAAR) 25 mg tablet   2. Severe obesity (BMI 35.0-35.9 with comorbidity) ([facility])  E66.01 278.01     Z68.35 V85.35    3. Moderate nonproliferative diabetic retinopathy of both eyes with macular edema associated with type 2 diabetes mellitus ([facility] [address]  E11.3313 250.50      362.07      362.05    4. Type 2 diabetes mellitus without complication, without long-term current use of insulin ([facility])  E11.9 250.00    5. Age-related nuclear cataract of both eyes  H25.13 366.16    6. Other hyperlipidemia  E78.49 272.4    7. Indigestion  K30 536.8 famotidine (PEPCID) 20 mg tablet   8. Osteopenia, unspecified location  M85.80 733.90    9. Cataract, unspecified cataract type, unspecified laterality  H26.9 366.9    10. History of basal cell carcinoma (BCC)  Z85.828 V10.83      · Indigestion: New problem, uncontrolled. Start PRN Pepcid. Discussed dietary recommendations  · HTN: Chronic, with side effects from Lisinopril. discussed medication options and associated risks/benefits/side effects/precautions; patient would like to try Losartan.  All other conditions listed above: chronic and stable. Will continue current treatment regimen. Discussed following up with specialist as scheduled. Discussed recommendations for diet and exercise. I have discussed the diagnosis with the patient and the intended plan as seen in the above orders. The patient has received an after-visit summary along with patient information handout. I have discussed medication side effects and warnings with the patient as well. Disposition  Follow-up and Dispositions    · Return in about 3 months (around 4/11/2022).            Steffan Epley, MD

## 2022-01-11 NOTE — PROGRESS NOTES
Identified pt with two pt identifiers(name and ). Reviewed record in preparation for visit and have obtained necessary documentation. Chief Complaint   Patient presents with    Hypertension        Vitals:    22 0805   Weight: 189 lb (85.7 kg)   Height: 5' 1\" (1.549 m)   PainSc:   0 - No pain       Health Maintenance Due   Topic    Foot Exam Q1     COVID-19 Vaccine (3 - Booster for Hernandez Peter series)       Coordination of Care Questionnaire:  :   1) Have you been to an emergency room, urgent care, or hospitalized since your last visit? If yes, where when, and reason for visit? no       2. Have seen or consulted any other health care provider since your last visit? If yes, where when, and reason for visit? NO    3. For patients over 45: Has the patient had a colonoscopy? Yes, HM satisfied with blue hyperlink     If the patient is female:    4. For patients over 40: Has the patient had a mammogram? Yes, HM satisfied with blue hyperlink    5. For patients over 21: Has the patient had a pap smear? No    Patient is accompanied by self I have received verbal consent from Yaneli Haque to discuss any/all medical information while they are present in the room.

## 2022-01-27 DIAGNOSIS — E11.9 TYPE 2 DIABETES MELLITUS WITHOUT COMPLICATION, WITHOUT LONG-TERM CURRENT USE OF INSULIN (HCC): ICD-10-CM

## 2022-01-27 RX ORDER — IBUPROFEN 200 MG
CAPSULE ORAL
Qty: 100 STRIP | Refills: 3 | Status: SHIPPED | OUTPATIENT
Start: 2022-01-27

## 2022-01-27 NOTE — TELEPHONE ENCOUNTER
Last Visit: 1/11/22 MD Maldonado  Next Appointment: Not scheduled  Previous Refill Encounter(s): 1/7/21 100 + 3    Requested Prescriptions     Pending Prescriptions Disp Refills    glucose blood VI test strips (blood glucose test) strip 100 Strip 3     Sig: Per insurance preference, test 1x/d dx diabetes E11.9

## 2022-02-17 ENCOUNTER — TELEPHONE (OUTPATIENT)
Dept: FAMILY MEDICINE CLINIC | Age: 67
End: 2022-02-17

## 2022-02-17 DIAGNOSIS — E11.9 TYPE 2 DIABETES MELLITUS WITHOUT COMPLICATION, WITHOUT LONG-TERM CURRENT USE OF INSULIN (HCC): Primary | ICD-10-CM

## 2022-02-17 DIAGNOSIS — E55.9 VITAMIN D INSUFFICIENCY: ICD-10-CM

## 2022-02-17 NOTE — TELEPHONE ENCOUNTER
----- Message from Arlin Cruz sent at 2/17/2022  9:30 AM EST -----  Subject: Message to Provider    QUESTIONS  Information for Provider? Please let Pt know if she needs to fast for any   blood work that Dr. Vicky Zayas may need to have for PT next appt. 4/11/2022   Please advise PT  ---------------------------------------------------------------------------  --------------  CALL BACK INFO  What is the best way for the office to contact you? OK to leave message on   voicemail  Preferred Call Back Phone Number? [phone]  ---------------------------------------------------------------------------  --------------  SCRIPT ANSWERS  Relationship to Patient?  Self

## 2022-02-17 NOTE — TELEPHONE ENCOUNTER
Called, spoke to pt. Two pt identifiers confirmed. Patient is asking if she has to fast for her next appointment in April for labs. She stated she just wanted to be sure.

## 2022-03-16 DIAGNOSIS — E11.9 TYPE 2 DIABETES MELLITUS WITHOUT COMPLICATION, WITHOUT LONG-TERM CURRENT USE OF INSULIN (HCC): ICD-10-CM

## 2022-03-16 RX ORDER — METFORMIN HYDROCHLORIDE 500 MG/1
1500 TABLET, EXTENDED RELEASE ORAL
Qty: 270 TABLET | Refills: 0 | Status: SHIPPED | OUTPATIENT
Start: 2022-03-16 | End: 2022-06-13 | Stop reason: SDUPTHER

## 2022-03-16 NOTE — TELEPHONE ENCOUNTER
Last visit 01/11/2022 MD Laverne Padilla   Next appointment 04/11/2022 MD Laverne Padilla   Previous refill encounter(s)   09/20/2021 Glucophage-XR #270 with 1 refill     Requested Prescriptions     Pending Prescriptions Disp Refills    metFORMIN ER (GLUCOPHAGE XR) 500 mg tablet 270 Tablet 0     Sig: Take 3 Tablets by mouth daily (with dinner).

## 2022-03-18 PROBLEM — E55.9 VITAMIN D INSUFFICIENCY: Status: ACTIVE | Noted: 2018-08-03

## 2022-03-18 PROBLEM — E11.9 TYPE II DIABETES MELLITUS (HCC): Status: ACTIVE | Noted: 2018-05-11

## 2022-03-18 PROBLEM — E11.3313 MODERATE NONPROLIFERATIVE DIABETIC RETINOPATHY OF BOTH EYES WITH MACULAR EDEMA ASSOCIATED WITH TYPE 2 DIABETES MELLITUS (HCC): Status: ACTIVE | Noted: 2020-09-22

## 2022-03-18 PROBLEM — K57.90 DIVERTICULOSIS: Status: ACTIVE | Noted: 2018-06-12

## 2022-03-19 PROBLEM — H25.13 AGE-RELATED NUCLEAR CATARACT OF BOTH EYES: Status: ACTIVE | Noted: 2020-09-22

## 2022-03-19 PROBLEM — Z98.890 HISTORY OF COLONOSCOPY: Status: ACTIVE | Noted: 2018-06-12

## 2022-03-19 PROBLEM — I10 PRIMARY HYPERTENSION: Status: ACTIVE | Noted: 2022-01-11

## 2022-03-19 PROBLEM — Z90.710 H/O: HYSTERECTOMY: Status: ACTIVE | Noted: 2020-02-18

## 2022-03-19 PROBLEM — E66.01 SEVERE OBESITY (HCC): Status: ACTIVE | Noted: 2019-10-18

## 2022-03-19 PROBLEM — L57.0 ACTINIC KERATOSIS: Status: ACTIVE | Noted: 2018-06-14

## 2022-03-19 PROBLEM — Z85.828 HISTORY OF BASAL CELL CARCINOMA: Status: ACTIVE | Noted: 2018-06-08

## 2022-03-19 PROBLEM — E78.49 OTHER HYPERLIPIDEMIA: Status: ACTIVE | Noted: 2018-08-03

## 2022-03-21 NOTE — TELEPHONE ENCOUNTER
Called, spoke to pt. Two pt identifiers confirmed. Writer informed patient that labs will be fasting. Pt verbalized understanding of information discussed w/ no further questions at this time.    Azalia Whiteside LPN

## 2022-04-11 ENCOUNTER — OFFICE VISIT (OUTPATIENT)
Dept: FAMILY MEDICINE CLINIC | Age: 67
End: 2022-04-11
Payer: MEDICARE

## 2022-04-11 VITALS
TEMPERATURE: 97.3 F | OXYGEN SATURATION: 99 % | DIASTOLIC BLOOD PRESSURE: 70 MMHG | SYSTOLIC BLOOD PRESSURE: 138 MMHG | WEIGHT: 187.2 LBS | HEART RATE: 80 BPM | BODY MASS INDEX: 35.34 KG/M2 | HEIGHT: 61 IN | RESPIRATION RATE: 18 BRPM

## 2022-04-11 DIAGNOSIS — H25.13 AGE-RELATED NUCLEAR CATARACT OF BOTH EYES: ICD-10-CM

## 2022-04-11 DIAGNOSIS — I10 PRIMARY HYPERTENSION: ICD-10-CM

## 2022-04-11 DIAGNOSIS — M85.80 OSTEOPENIA, UNSPECIFIED LOCATION: ICD-10-CM

## 2022-04-11 DIAGNOSIS — E11.9 TYPE 2 DIABETES MELLITUS WITHOUT COMPLICATION, WITHOUT LONG-TERM CURRENT USE OF INSULIN (HCC): Primary | ICD-10-CM

## 2022-04-11 DIAGNOSIS — E66.01 SEVERE OBESITY (HCC): ICD-10-CM

## 2022-04-11 DIAGNOSIS — E55.9 VITAMIN D INSUFFICIENCY: ICD-10-CM

## 2022-04-11 DIAGNOSIS — K30 INDIGESTION: ICD-10-CM

## 2022-04-11 DIAGNOSIS — E11.3313 MODERATE NONPROLIFERATIVE DIABETIC RETINOPATHY OF BOTH EYES WITH MACULAR EDEMA ASSOCIATED WITH TYPE 2 DIABETES MELLITUS (HCC): ICD-10-CM

## 2022-04-11 DIAGNOSIS — Z85.828 HISTORY OF BASAL CELL CARCINOMA: ICD-10-CM

## 2022-04-11 PROCEDURE — G8752 SYS BP LESS 140: HCPCS | Performed by: FAMILY MEDICINE

## 2022-04-11 PROCEDURE — G8417 CALC BMI ABV UP PARAM F/U: HCPCS | Performed by: FAMILY MEDICINE

## 2022-04-11 PROCEDURE — 3044F HG A1C LEVEL LT 7.0%: CPT | Performed by: FAMILY MEDICINE

## 2022-04-11 PROCEDURE — 3017F COLORECTAL CA SCREEN DOC REV: CPT | Performed by: FAMILY MEDICINE

## 2022-04-11 PROCEDURE — G8399 PT W/DXA RESULTS DOCUMENT: HCPCS | Performed by: FAMILY MEDICINE

## 2022-04-11 PROCEDURE — G0463 HOSPITAL OUTPT CLINIC VISIT: HCPCS | Performed by: FAMILY MEDICINE

## 2022-04-11 PROCEDURE — G8427 DOCREV CUR MEDS BY ELIG CLIN: HCPCS | Performed by: FAMILY MEDICINE

## 2022-04-11 PROCEDURE — 1101F PT FALLS ASSESS-DOCD LE1/YR: CPT | Performed by: FAMILY MEDICINE

## 2022-04-11 PROCEDURE — 99213 OFFICE O/P EST LOW 20 MIN: CPT | Performed by: FAMILY MEDICINE

## 2022-04-11 PROCEDURE — 2022F DILAT RTA XM EVC RTNOPTHY: CPT | Performed by: FAMILY MEDICINE

## 2022-04-11 PROCEDURE — G8754 DIAS BP LESS 90: HCPCS | Performed by: FAMILY MEDICINE

## 2022-04-11 PROCEDURE — G8510 SCR DEP NEG, NO PLAN REQD: HCPCS | Performed by: FAMILY MEDICINE

## 2022-04-11 PROCEDURE — G8536 NO DOC ELDER MAL SCRN: HCPCS | Performed by: FAMILY MEDICINE

## 2022-04-11 PROCEDURE — G9899 SCRN MAM PERF RSLTS DOC: HCPCS | Performed by: FAMILY MEDICINE

## 2022-04-11 PROCEDURE — 1090F PRES/ABSN URINE INCON ASSESS: CPT | Performed by: FAMILY MEDICINE

## 2022-04-11 RX ORDER — LOSARTAN POTASSIUM 50 MG/1
50 TABLET ORAL DAILY
Qty: 90 TABLET | Refills: 1 | Status: SHIPPED | OUTPATIENT
Start: 2022-04-11 | End: 2022-09-30 | Stop reason: SDUPTHER

## 2022-04-11 NOTE — PROGRESS NOTES
Chief Complaint   Patient presents with    Diabetes    Hypertension     1. \"Have you been to the ER, urgent care clinic since your last visit? Hospitalized since your last visit? \" No    2. \"Have you seen or consulted any other health care providers outside of the [address] [place] [address] since your last visit? \" No     3. For patients aged 39-70: Has the patient had a colonoscopy / FIT/ Cologuard? Yes - no Care Gap present      If the patient is female:    4. For patients aged 41-77: Has the patient had a mammogram within the past 2 years? Yes - no Care Gap present      5. For patients aged 21-65: Has the patient had a pap smear?  Yes - no Care Gap present

## 2022-04-11 NOTE — PROGRESS NOTES
Brandi Vernon  79 y.o. female  1955  Keerthi Barlow  [id number]     [address] [facility]       Encounter Date: 4/11/2022           Established Patient Visit Note: Diogenes Velasquez MD    Reason for Appointment:  Chief Complaint   Patient presents with    Diabetes    Hypertension         History of Present Illness:  History provided by patient    Brandi Vernon is a 79 y.o. female who presents to clinic today for:     · Indigestion: PRN pepcid started at last visit, and she reports that this has been helpful. · HTN, Chronic cough. Patient switched from lisinopril to Losartan at last visit, and she reports that this has resolved her cough. · Obesity: She continues to work on diet, and she is walking two times per day with her dog. · DM2: last A1c was 6.5 on 12/1/21. She reports that her FBG has been a little higher recently. She reports having BG from 130 to 150. Tolerating metformin well. · HLD: tolerating Crestor with no side effects  · Osteopenia: she had DEXA on 9/23/21 with Frax 1.6% HF and 10.4% MOF. She is taking calcium and vitamin d  · Cataract and Retinal Abnormality: Ophthalmologist: [facility] (Dr. Angela Cotto) is eye doctor, and retinal specialist is Dr. Isaias Rodas  ·   of [facility]: Followed by dermatology (Dr. Elli Pereira) with last visit in Feb, 2022 with stable findings. Review of Systems  All other ROS were reviewed and are negative except as discussed in HPI        Allergies: Codeine, Flomax [tamsulosin], Lisinopril, and Tetanus and diphther. tox (pf)    Medications:     Current Outpatient Medications:     losartan (COZAAR) 50 mg tablet, Take 1 Tablet by mouth daily. , Disp: 90 Tablet, Rfl: 1    metFORMIN ER (GLUCOPHAGE XR) 500 mg tablet, Take 3 Tablets by mouth daily (with dinner). , Disp: 270 Tablet, Rfl: 0    glucose blood VI test strips (blood glucose test) strip, Per insurance preference, test 1x/d dx diabetes E11.9, Disp: 100 Strip, Rfl: 3    OneTouch Delica Plus Lancet 33 gauge misc, , Disp: , Rfl:     famotidine (PEPCID) 20 mg tablet, Take 1 Tablet by mouth daily as needed for Heartburn., Disp: 90 Tablet, Rfl: 1    lancets misc, Per insurance preference, test 1x/d dx diabetes E11.9, Disp: 100 Each, Rfl: 3    rosuvastatin (CRESTOR) 5 mg tablet, Take 1 Tablet by mouth nightly., Disp: 90 Tablet, Rfl: 2    ibuprofen (MOTRIN) 800 mg tablet, Take 1 Tablet by mouth every six (6) hours as needed for Pain., Disp: 30 Tablet, Rfl: 0    Blood-Glucose Meter monitoring kit, Use to check fasting and meal time blood sugar daily. Patient may use whichever product is covered by insurance., Disp: 1 Kit, Rfl: 0    History  Patient Care Team:  Brando Millan MD as PCP - General (Family Medicine)  Brando Millan MD as PCP - [facility]  Sudheer Nieto MD (Obstetrics & Gynecology)  Heath Agudelo MD (Colon and Rectal Surgery)  Fernando Lucas MD as Physician (Dermatology)  Lilliana Ignacio NP (General Surgery)    Past Medical History: she has a past medical history of Calculus of kidney, Diabetes ([address] and Headache. Past Surgical History: she has a past surgical history that includes pr breast surgery procedure unlisted (1999); hx gyn (2003); hx cyst removal (1980); hx lap cholecystectomy (05/20/2021); hx breast biopsy (Right); and hx cyst incision and drainage (Bilateral). Family Medical History: family history includes Cancer in her father; Jerl Corrente in her mother; Migraines in her father; Parkinson's Disease in her father; Stroke in her father. Social History: she reports that she has never smoked. She has never used smokeless tobacco. She reports that she does not drink alcohol and does not use drugs.         Objective:   Visit Vitals  /70 (BP 1 Location: Left upper arm, BP Patient Position: Sitting, BP Cuff Size: Large adult)   Pulse 80   Temp 97.3 °F (36.3 °C) (Temporal)   Resp 18   Ht 5' 1\" (1.549 m) Wt 187 lb 3.2 oz (84.9 kg)   SpO2 99%   BMI 35.37 kg/m²     Wt Readings from Last 3 Encounters:   04/11/22 187 lb 3.2 oz (84.9 kg)   01/11/22 189 lb (85.7 kg)   12/01/21 198 lb 9.6 oz (90.1 kg)       Physical Exam  Vitals and nursing note reviewed. Constitutional:       General: She is not in acute distress. Appearance: Normal appearance. HENT:      Head: Normocephalic and atraumatic. Nose: Nose normal.      Mouth/Throat:      Mouth: Mucous membranes are moist.   Eyes:      Extraocular Movements: Extraocular movements intact. Conjunctiva/sclera: Conjunctivae normal.      Pupils: Pupils are equal, round, and reactive to light. Cardiovascular:      Rate and Rhythm: Normal rate and regular rhythm. Pulses: Normal pulses. Heart sounds: Normal heart sounds. No murmur heard. No friction rub. No gallop. Pulmonary:      Effort: Pulmonary effort is normal. No respiratory distress. Breath sounds: Normal breath sounds. No wheezing, rhonchi or rales. Musculoskeletal:         General: Normal range of motion. Cervical back: Normal range of motion and neck supple. No rigidity. No muscular tenderness. Lymphadenopathy:      Cervical: No cervical adenopathy. Skin:     General: Skin is warm. Coloration: Skin is not jaundiced. Neurological:      General: No focal deficit present. Mental Status: She is alert. Mental status is at baseline. Cranial Nerves: Cranial nerves are intact. Motor: Motor function is intact. Coordination: Coordination is intact. Psychiatric:         Mood and Affect: Mood normal.         Behavior: Behavior normal.         Thought Content: Thought content normal.         Judgment: Judgment normal.         Assessment & Plan:      ICD-10-CM ICD-9-CM    1.  Type 2 diabetes mellitus without complication, without long-term current use of insulin ([facility])  E11.9 250.00 MICROALBUMIN, UR, RAND W/ MICROALB/CREAT RATIO      MICROALBUMIN, UR, RAND W/ MICROALB/CREAT RATIO      TSH 3RD GENERATION      HEMOGLOBIN A1C WITH EAG      METABOLIC PANEL, COMPREHENSIVE      LIPID PANEL      CBC W/O DIFF   2. Primary hypertension  I10 401.9 losartan (COZAAR) 50 mg tablet   3. Moderate nonproliferative diabetic retinopathy of both eyes with macular edema associated with type 2 diabetes mellitus ([facility]  E11.3313 250.50      362.07      362.05    4. Severe obesity ([facility]  E66.01 278.01    5. Vitamin D insufficiency  E55.9 268.9 CANCELED: VITAMIN D, 25 HYDROXY   6. Indigestion  K30 536.8    7. Age-related nuclear cataract of both eyes  H25.13 366.16    8. Osteopenia, unspecified location  M85.80 733.90    9. History of basal cell carcinoma  Z85.828 V10.83        · HTN, Chronic, BP not yet at goal. Increase losartan from 25mg to 50mg. Discussed monitoring BP and calling if too low or persistently high. · Obesity: I have reviewed/discussed the above normal BMI with the patient. I have recommended the following interventions: dietary management education, guidance, and counseling, encourage exercise, monitor weight and prescribed dietary intake. · DM2:Chronic, stable. Check labs and continue current therapy. All other conditions listed above: Chronic, stable and/or managed by specialist. Will continue current treatment regimen. Will check labs as reflected above. Discussed following up with specialist as scheduled. Discussed recommendations for diet and exercise. I have discussed the diagnosis with the patient and the intended plan as seen in the above orders. The patient has received an after-visit summary along with patient information handout. I have discussed medication side effects and warnings with the patient as well. Disposition  Follow-up and Dispositions    · Return in about 3 months (around 7/11/2022).            Bert Cisse MD

## 2022-04-12 LAB
ALBUMIN SERPL-MCNC: 3.2 G/DL (ref 3.5–5)
ALBUMIN/GLOB SERPL: 1.2 {RATIO} (ref 1.1–2.2)
ALP SERPL-CCNC: 70 U/L (ref 45–117)
ALT SERPL-CCNC: 10 U/L (ref 12–78)
ANION GAP SERPL CALC-SCNC: 4 MMOL/L (ref 5–15)
AST SERPL-CCNC: 12 U/L (ref 15–37)
BILIRUB SERPL-MCNC: 0.3 MG/DL (ref 0.2–1)
BUN SERPL-MCNC: 12 MG/DL (ref 6–20)
BUN/CREAT SERPL: 16 (ref 12–20)
CALCIUM SERPL-MCNC: 9.4 MG/DL (ref 8.5–10.1)
CHLORIDE SERPL-SCNC: 108 MMOL/L (ref 97–108)
CHOLEST SERPL-MCNC: 128 MG/DL
CO2 SERPL-SCNC: 28 MMOL/L (ref 21–32)
CREAT SERPL-MCNC: 0.73 MG/DL (ref 0.55–1.02)
CREAT UR-MCNC: 194 MG/DL
ERYTHROCYTE [DISTWIDTH] IN BLOOD BY AUTOMATED COUNT: 12.9 % (ref 11.5–14.5)
EST. AVERAGE GLUCOSE BLD GHB EST-MCNC: 151 MG/DL
GLOBULIN SER CALC-MCNC: 2.6 G/DL (ref 2–4)
GLUCOSE SERPL-MCNC: 173 MG/DL (ref 65–100)
HBA1C MFR BLD: 6.9 % (ref 4–5.6)
HCT VFR BLD AUTO: 40.1 % (ref 35–47)
HDLC SERPL-MCNC: 53 MG/DL
HDLC SERPL: 2.4 {RATIO} (ref 0–5)
HGB BLD-MCNC: 12.4 G/DL (ref 11.5–16)
LDLC SERPL CALC-MCNC: 49.8 MG/DL (ref 0–100)
MCH RBC QN AUTO: 30.3 PG (ref 26–34)
MCHC RBC AUTO-ENTMCNC: 30.9 G/DL (ref 30–36.5)
MCV RBC AUTO: 98 FL (ref 80–99)
MICROALBUMIN UR-MCNC: 1.16 MG/DL
MICROALBUMIN/CREAT UR-RTO: 6 MG/G (ref 0–30)
NRBC # BLD: 0 K/UL (ref 0–0.01)
NRBC BLD-RTO: 0 PER 100 WBC
PLATELET # BLD AUTO: 247 K/UL (ref 150–400)
PMV BLD AUTO: 11.3 FL (ref 8.9–12.9)
POTASSIUM SERPL-SCNC: 4.9 MMOL/L (ref 3.5–5.1)
PROT SERPL-MCNC: 5.8 G/DL (ref 6.4–8.2)
RBC # BLD AUTO: 4.09 M/UL (ref 3.8–5.2)
SODIUM SERPL-SCNC: 140 MMOL/L (ref 136–145)
TRIGL SERPL-MCNC: 126 MG/DL (ref ?–150)
TSH SERPL DL<=0.05 MIU/L-ACNC: 1.16 UIU/ML (ref 0.36–3.74)
VLDLC SERPL CALC-MCNC: 25.2 MG/DL
WBC # BLD AUTO: 9.5 K/UL (ref 3.6–11)

## 2022-06-13 ENCOUNTER — PATIENT MESSAGE (OUTPATIENT)
Dept: FAMILY MEDICINE CLINIC | Age: 67
End: 2022-06-13

## 2022-06-13 DIAGNOSIS — E11.9 TYPE 2 DIABETES MELLITUS WITHOUT COMPLICATION, WITHOUT LONG-TERM CURRENT USE OF INSULIN (HCC): ICD-10-CM

## 2022-06-13 RX ORDER — METFORMIN HYDROCHLORIDE 500 MG/1
1500 TABLET, EXTENDED RELEASE ORAL
Qty: 270 TABLET | Refills: 0 | Status: SHIPPED | OUTPATIENT
Start: 2022-06-13 | End: 2022-09-09 | Stop reason: SDUPTHER

## 2022-06-13 NOTE — TELEPHONE ENCOUNTER
PCP: Brando Millan MD    Last appt: 4/11/2022  Future Appointments   Date Time Provider Lisa Cabrera   8/3/2022  8:30 AM Brando Millan MD PAFP BS AMB       Requested Prescriptions     Pending Prescriptions Disp Refills    metFORMIN ER (GLUCOPHAGE XR) 500 mg tablet 270 Tablet 0     Sig: Take 3 Tablets by mouth daily (with dinner).          Prior labs and Blood pressures:  BP Readings from Last 3 Encounters:   04/11/22 138/70   01/11/22 138/78   12/01/21 (!) 156/88     Lab Results   Component Value Date/Time    Sodium 140 04/11/2022 08:58 AM    Potassium 4.9 04/11/2022 08:58 AM    Chloride 108 04/11/2022 08:58 AM    CO2 28 04/11/2022 08:58 AM    Anion gap 4 (L) 04/11/2022 08:58 AM    Glucose 173 (H) 04/11/2022 08:58 AM    BUN 12 04/11/2022 08:58 AM    Creatinine 0.73 04/11/2022 08:58 AM    BUN/Creatinine ratio 16 04/11/2022 08:58 AM    GFR est AA >60 04/11/2022 08:58 AM    GFR est non-AA >60 04/11/2022 08:58 AM    Calcium 9.4 04/11/2022 08:58 AM     Lab Results   Component Value Date/Time    Hemoglobin A1c 6.9 (H) 04/11/2022 08:58 AM    Hemoglobin A1c (POC) 6.7 02/18/2020 09:19 AM     Lab Results   Component Value Date/Time    Cholesterol, total 128 04/11/2022 08:58 AM    HDL Cholesterol 53 04/11/2022 08:58 AM    LDL, calculated 49.8 04/11/2022 08:58 AM    VLDL, calculated 25.2 04/11/2022 08:58 AM    Triglyceride 126 04/11/2022 08:58 AM    CHOL/HDL Ratio 2.4 04/11/2022 08:58 AM     Lab Results   Component Value Date/Time    Vitamin D 25-Hydroxy 33.6 08/09/2021 09:08 AM       Lab Results   Component Value Date/Time    TSH 1.16 04/11/2022 08:58 AM

## 2022-06-20 DIAGNOSIS — E11.9 TYPE 2 DIABETES MELLITUS WITHOUT COMPLICATION, WITHOUT LONG-TERM CURRENT USE OF INSULIN (HCC): ICD-10-CM

## 2022-06-20 RX ORDER — ROSUVASTATIN CALCIUM 5 MG/1
5 TABLET, COATED ORAL
Qty: 90 TABLET | Refills: 2 | Status: SHIPPED | OUTPATIENT
Start: 2022-06-20

## 2022-06-20 NOTE — TELEPHONE ENCOUNTER
Last Visit: 4/11/22 with MD Angelica Fleming  Next Appointment: 8/3/22 with MD Angelica Fleming  Previous Refill Encounter(s): 9/28/21 #90 with 2 refills    Requested Prescriptions     Pending Prescriptions Disp Refills    rosuvastatin (CRESTOR) 5 mg tablet 90 Tablet 2     Sig: Take 1 Tablet by mouth nightly.          For Cleve Bedolla in place:    Recommendation Provided To:    Intervention Detail: New Rx: 1, reason: Patient Preference   Gap Closed?:    Intervention Accepted By:   Kalyn Cruz Time Spent (min): 5

## 2022-08-03 ENCOUNTER — OFFICE VISIT (OUTPATIENT)
Dept: FAMILY MEDICINE CLINIC | Age: 67
End: 2022-08-03
Payer: MEDICARE

## 2022-08-03 VITALS
OXYGEN SATURATION: 100 % | BODY MASS INDEX: 34.02 KG/M2 | WEIGHT: 180.2 LBS | HEART RATE: 76 BPM | TEMPERATURE: 98.2 F | RESPIRATION RATE: 16 BRPM | DIASTOLIC BLOOD PRESSURE: 78 MMHG | HEIGHT: 61 IN | SYSTOLIC BLOOD PRESSURE: 154 MMHG

## 2022-08-03 DIAGNOSIS — I10 PRIMARY HYPERTENSION: ICD-10-CM

## 2022-08-03 DIAGNOSIS — E78.49 OTHER HYPERLIPIDEMIA: ICD-10-CM

## 2022-08-03 DIAGNOSIS — Z85.828 HISTORY OF BASAL CELL CARCINOMA: ICD-10-CM

## 2022-08-03 DIAGNOSIS — M85.80 OSTEOPENIA, UNSPECIFIED LOCATION: ICD-10-CM

## 2022-08-03 DIAGNOSIS — E11.9 TYPE 2 DIABETES MELLITUS WITHOUT COMPLICATION, WITHOUT LONG-TERM CURRENT USE OF INSULIN (HCC): Primary | ICD-10-CM

## 2022-08-03 DIAGNOSIS — I10 ESSENTIAL HYPERTENSION: ICD-10-CM

## 2022-08-03 DIAGNOSIS — E66.01 SEVERE OBESITY (HCC): ICD-10-CM

## 2022-08-03 DIAGNOSIS — K30 INDIGESTION: ICD-10-CM

## 2022-08-03 DIAGNOSIS — H25.13 AGE-RELATED NUCLEAR CATARACT OF BOTH EYES: ICD-10-CM

## 2022-08-03 LAB
ALBUMIN SERPL-MCNC: 3.2 G/DL (ref 3.5–5)
ALBUMIN/GLOB SERPL: 1.1 {RATIO} (ref 1.1–2.2)
ALP SERPL-CCNC: 76 U/L (ref 45–117)
ALT SERPL-CCNC: 11 U/L (ref 12–78)
ANION GAP SERPL CALC-SCNC: 6 MMOL/L (ref 5–15)
AST SERPL-CCNC: 9 U/L (ref 15–37)
BILIRUB SERPL-MCNC: 0.3 MG/DL (ref 0.2–1)
BUN SERPL-MCNC: 10 MG/DL (ref 6–20)
BUN/CREAT SERPL: 14 (ref 12–20)
CALCIUM SERPL-MCNC: 9.4 MG/DL (ref 8.5–10.1)
CHLORIDE SERPL-SCNC: 107 MMOL/L (ref 97–108)
CO2 SERPL-SCNC: 28 MMOL/L (ref 21–32)
CREAT SERPL-MCNC: 0.7 MG/DL (ref 0.55–1.02)
ERYTHROCYTE [DISTWIDTH] IN BLOOD BY AUTOMATED COUNT: 12.4 % (ref 11.5–14.5)
EST. AVERAGE GLUCOSE BLD GHB EST-MCNC: 151 MG/DL
GLOBULIN SER CALC-MCNC: 3 G/DL (ref 2–4)
GLUCOSE SERPL-MCNC: 151 MG/DL (ref 65–100)
HBA1C MFR BLD: 6.9 % (ref 4–5.6)
HCT VFR BLD AUTO: 38.2 % (ref 35–47)
HGB BLD-MCNC: 12.4 G/DL (ref 11.5–16)
MCH RBC QN AUTO: 30.9 PG (ref 26–34)
MCHC RBC AUTO-ENTMCNC: 32.5 G/DL (ref 30–36.5)
MCV RBC AUTO: 95.3 FL (ref 80–99)
NRBC # BLD: 0 K/UL (ref 0–0.01)
NRBC BLD-RTO: 0 PER 100 WBC
PLATELET # BLD AUTO: 247 K/UL (ref 150–400)
PMV BLD AUTO: 10.9 FL (ref 8.9–12.9)
POTASSIUM SERPL-SCNC: 4.6 MMOL/L (ref 3.5–5.1)
PROT SERPL-MCNC: 6.2 G/DL (ref 6.4–8.2)
RBC # BLD AUTO: 4.01 M/UL (ref 3.8–5.2)
SODIUM SERPL-SCNC: 141 MMOL/L (ref 136–145)
TSH SERPL DL<=0.05 MIU/L-ACNC: 1.03 UIU/ML (ref 0.36–3.74)
WBC # BLD AUTO: 8 K/UL (ref 3.6–11)

## 2022-08-03 PROCEDURE — 99213 OFFICE O/P EST LOW 20 MIN: CPT | Performed by: FAMILY MEDICINE

## 2022-08-03 PROCEDURE — G8754 DIAS BP LESS 90: HCPCS | Performed by: FAMILY MEDICINE

## 2022-08-03 PROCEDURE — 1123F ACP DISCUSS/DSCN MKR DOCD: CPT | Performed by: FAMILY MEDICINE

## 2022-08-03 PROCEDURE — G8417 CALC BMI ABV UP PARAM F/U: HCPCS | Performed by: FAMILY MEDICINE

## 2022-08-03 PROCEDURE — 2022F DILAT RTA XM EVC RTNOPTHY: CPT | Performed by: FAMILY MEDICINE

## 2022-08-03 PROCEDURE — G8510 SCR DEP NEG, NO PLAN REQD: HCPCS | Performed by: FAMILY MEDICINE

## 2022-08-03 PROCEDURE — 3017F COLORECTAL CA SCREEN DOC REV: CPT | Performed by: FAMILY MEDICINE

## 2022-08-03 PROCEDURE — G8399 PT W/DXA RESULTS DOCUMENT: HCPCS | Performed by: FAMILY MEDICINE

## 2022-08-03 PROCEDURE — G9899 SCRN MAM PERF RSLTS DOC: HCPCS | Performed by: FAMILY MEDICINE

## 2022-08-03 PROCEDURE — 1090F PRES/ABSN URINE INCON ASSESS: CPT | Performed by: FAMILY MEDICINE

## 2022-08-03 PROCEDURE — 3044F HG A1C LEVEL LT 7.0%: CPT | Performed by: FAMILY MEDICINE

## 2022-08-03 PROCEDURE — G8536 NO DOC ELDER MAL SCRN: HCPCS | Performed by: FAMILY MEDICINE

## 2022-08-03 PROCEDURE — G0463 HOSPITAL OUTPT CLINIC VISIT: HCPCS | Performed by: FAMILY MEDICINE

## 2022-08-03 PROCEDURE — G8753 SYS BP > OR = 140: HCPCS | Performed by: FAMILY MEDICINE

## 2022-08-03 PROCEDURE — G8427 DOCREV CUR MEDS BY ELIG CLIN: HCPCS | Performed by: FAMILY MEDICINE

## 2022-08-03 PROCEDURE — 1101F PT FALLS ASSESS-DOCD LE1/YR: CPT | Performed by: FAMILY MEDICINE

## 2022-08-03 NOTE — PROGRESS NOTES
Mariusz Sood  79 y.o. female  1955  [address]  [id number]     [address] [place] [address] PRACTICE       Encounter Date: 8/3/2022           Established Patient Visit Note: Juan Daniel Card MD    Reason for Appointment:  Chief Complaint   Patient presents with    Hypertension    Diabetes    Cholesterol Problem         History of Present Illness:  History provided by patient    Mariusz Sood is a 79 y.o. female who presents to clinic today for: Indigestion: PRN pepcid started at last visit, and [email or web address] reports that this has been helpful. HTN: patient reports BP at home as 981 systolic. She endorses good compliance with her medication, but she did not take it today. Obesity: She continues to work on diet  DM2: last A1c was 6.9 on 4/11/22  HLD: tolerating Crestor with no side effects  Osteopenia: she had DEXA on 9/23/21 with Frax 1.6% HF and 10.4% MOF. She is taking calcium and vitamin d  Cataract and Retinal Abnormality: Ophthalmologist: [facility] (Dr. Cecil Gandhi) is eye doctor, and retinal specialist is Dr. Yomaira Barry of [facility]: Followed by dermatology (Dr. Cynthia Frasre) with last visit in Feb, 2022 with stable findings. Review of Systems  All other ROS were reviewed and are negative except as discussed in HPI           Allergies: Codeine, Flomax [tamsulosin], Lisinopril, and Tetanus and diphther. tox (pf)    Medications:     Current Outpatient Medications:     rosuvastatin (CRESTOR) 5 mg tablet, Take 1 Tablet by mouth nightly., Disp: 90 Tablet, Rfl: 2    metFORMIN ER (GLUCOPHAGE XR) 500 mg tablet, Take 3 Tablets by mouth daily (with dinner). , Disp: 270 Tablet, Rfl: 0    losartan (COZAAR) 50 mg tablet, Take 1 Tablet by mouth daily. , Disp: 90 Tablet, Rfl: 1    glucose blood VI test strips (blood glucose test) strip, Per insurance preference, test 1x/d dx diabetes E11.9, Disp: 100 Strip, Rfl: 3    OneTouch Delica Plus Lancet 33 gauge misc, , Disp: , Rfl: famotidine (PEPCID) 20 mg tablet, Take 1 Tablet by mouth daily as needed for Heartburn., Disp: 90 Tablet, Rfl: 1    lancets misc, Per insurance preference, test 1x/d dx diabetes E11.9, Disp: 100 Each, Rfl: 3    ibuprofen (MOTRIN) 800 mg tablet, Take 1 Tablet by mouth every six (6) hours as needed for Pain., Disp: 30 Tablet, Rfl: 0    Blood-Glucose Meter monitoring kit, Use to check fasting and meal time blood sugar daily. Patient may use whichever product is covered by insurance., Disp: 1 Kit, Rfl: 0    History  Patient Care Team:  Bebo Zapata MD as PCP - General (Family Medicine)  Bebo Zapata MD as PCP - [address] [place] [address] Provider  Jason Platt MD (Obstetrics & Gynecology)  Ashley Stevens MD (Colon and Rectal Surgery)  Alexis Mao MD as Physician (Dermatology Physician)  Shay Ibrahim NP (General Surgery)    Past Medical History: she has a past medical history of Calculus of kidney, Diabetes ([address] and Headache. Past Surgical History: she has a past surgical history that includes pr breast surgery procedure unlisted (1999); hx gyn (2003); hx cyst removal (1980); hx lap cholecystectomy (05/20/2021); hx breast biopsy (Right); and hx cyst incision and drainage (Bilateral). Family Medical History: family history includes Cancer in her father; Crescent Bodo in her mother; Migraines in her father; Parkinson's Disease in her father; Stroke in her father. Social History: she reports that she has never smoked. She has never used smokeless tobacco. She reports that she does not drink alcohol and does not use drugs.         Objective:   Visit Vitals  BP (!) 154/78 (BP 1 Location: Right arm, BP Patient Position: Sitting, BP Cuff Size: Adult)   Pulse 76   Temp 98.2 °F (36.8 °C) (Temporal)   Resp 16   Ht 5' 1\" (1.549 m)   Wt 180 lb 3.2 oz (81.7 kg)   SpO2 100%   BMI 34.05 kg/m²     Wt Readings from Last 3 Encounters:   08/03/22 180 lb 3.2 oz (81.7 kg)   04/11/22 187 lb 3.2 oz (84.9 kg) 01/11/22 189 lb (85.7 kg)       Physical Exam  Vitals and nursing note reviewed. Constitutional:       General: She is not in acute distress. Appearance: Normal appearance. HENT:      Head: Normocephalic and atraumatic. Nose: Nose normal.      Mouth/Throat:      Mouth: Mucous membranes are moist.   Eyes:      Extraocular Movements: Extraocular movements intact. Conjunctiva/sclera: Conjunctivae normal.      Pupils: Pupils are equal, round, and reactive to light. Cardiovascular:      Rate and Rhythm: Normal rate and regular rhythm. Pulses: Normal pulses. Heart sounds: Normal heart sounds. No murmur heard. No friction rub. No gallop. Pulmonary:      Effort: Pulmonary effort is normal. No respiratory distress. Breath sounds: Normal breath sounds. No wheezing, rhonchi or rales. Musculoskeletal:         General: Normal range of motion. Cervical back: Normal range of motion and neck supple. No rigidity. No muscular tenderness. Lymphadenopathy:      Cervical: No cervical adenopathy. Skin:     General: Skin is warm. Coloration: Skin is not jaundiced. Neurological:      General: No focal deficit present. Mental Status: She is alert. Mental status is at baseline. Cranial Nerves: Cranial nerves are intact. Motor: Motor function is intact. Coordination: Coordination is intact. Psychiatric:         Mood and Affect: Mood normal.         Behavior: Behavior normal.         Thought Content: Thought content normal.         Judgment: Judgment normal.       Assessment & Plan:      ICD-10-CM ICD-9-CM    1. Type 2 diabetes mellitus without complication, without long-term current use of insulin (HCC)  E11.9 250.00 HEMOGLOBIN A1C WITH EAG      TSH 3RD GENERATION      TSH 3RD GENERATION      HEMOGLOBIN A1C WITH EAG      2.  Primary hypertension  I10 401.9 CBC W/O DIFF      METABOLIC PANEL, COMPREHENSIVE      METABOLIC PANEL, COMPREHENSIVE      CBC W/O DIFF 3. Indigestion  K30 536.8       4. Essential hypertension  I10 401.9       5. Severe obesity ([address]  E66.01 278.01       6. Other hyperlipidemia  E78.49 272.4       7. Osteopenia, unspecified location  M85.80 733.90       8. Age-related nuclear cataract of both eyes  H25.13 366.16       9. History of basal cell carcinoma  Z85.828 V10.83         HTN: BP elevated today, but sudheer reports that she did not take her BP medicine today. She reports that she is hesitant to go up on BP medicine given risk of dizziness. She does agree to monitor her BP at home. RTC 2 weeks for repeat BP check. Obesity: I have reviewed/discussed the above normal BMI with the patient. I have recommended the following interventions: dietary management education, guidance, and counseling, encourage exercise, monitor weight and prescribed dietary intake. All other conditions listed above: Chronic, stable and/or managed by specialist. Will continue current treatment regimen. Will check labs as reflected above. Discussed following up with specialist as scheduled. Discussed recommendations for diet and exercise. I have discussed the diagnosis with the patient and the intended plan as seen in the above orders. The patient has received an after-visit summary along with patient information handout. I have discussed medication side effects and warnings with the patient as well. Disposition  Follow-up and Dispositions    Return for 2 weeks for nurse blood pressure check and 3 months for medicare wellnesss visit.            Chelsea Antonio MD

## 2022-08-03 NOTE — PROGRESS NOTES
Chief Complaint   Patient presents with    Hypertension    Diabetes    Cholesterol Problem     1. Have you been to the ER, urgent care clinic since your last visit? Hospitalized since your last visit? No    2. Have you seen or consulted any other health care providers outside of the [address] [place] [address] since your last visit? Include any pap smears or colon screening.  No

## 2022-08-25 ENCOUNTER — CLINICAL SUPPORT (OUTPATIENT)
Dept: FAMILY MEDICINE CLINIC | Age: 67
End: 2022-08-25
Payer: MEDICARE

## 2022-08-25 VITALS — SYSTOLIC BLOOD PRESSURE: 132 MMHG | DIASTOLIC BLOOD PRESSURE: 68 MMHG

## 2022-08-25 DIAGNOSIS — I10 ESSENTIAL HYPERTENSION: Primary | ICD-10-CM

## 2022-08-25 PROCEDURE — 95937 NEUROMUSCULAR JUNCTION TEST: CPT | Performed by: FAMILY MEDICINE

## 2022-08-25 NOTE — PROGRESS NOTES
Chief Complaint   Patient presents with    Blood Pressure Check     Patient arrived for BP check only.

## 2022-09-09 DIAGNOSIS — E11.9 TYPE 2 DIABETES MELLITUS WITHOUT COMPLICATION, WITHOUT LONG-TERM CURRENT USE OF INSULIN (HCC): ICD-10-CM

## 2022-09-09 RX ORDER — METFORMIN HYDROCHLORIDE 500 MG/1
1500 TABLET, EXTENDED RELEASE ORAL
Qty: 270 TABLET | Refills: 1 | Status: SHIPPED | OUTPATIENT
Start: 2022-09-09

## 2022-09-09 NOTE — TELEPHONE ENCOUNTER
Last Visit: 8/3/22 MD Obdulia Taylor  Next Appointment: 11/8/22 MD Obdulia Taylor  Previous Refill Encounter(s): 6/13/22 270    Requested Prescriptions     Pending Prescriptions Disp Refills    metFORMIN ER (GLUCOPHAGE XR) 500 mg tablet 270 Tablet 1     Sig: Take 3 Tablets by mouth daily (with dinner). For [facility] in place:   Recommendation Provided To:    Intervention Detail: New Rx: 1, reason: Patient Preference  Gap Closed?:   Intervention Accepted By:   Time Spent (min): 5

## 2022-09-12 DIAGNOSIS — M21.619 BUNION: Primary | ICD-10-CM

## 2022-09-30 DIAGNOSIS — I10 PRIMARY HYPERTENSION: ICD-10-CM

## 2022-09-30 RX ORDER — LOSARTAN POTASSIUM 50 MG/1
50 TABLET ORAL DAILY
Qty: 90 TABLET | Refills: 1 | Status: SHIPPED | OUTPATIENT
Start: 2022-09-30

## 2022-09-30 NOTE — TELEPHONE ENCOUNTER
Last Visit: 8/3/22 MD Beatrice Daniel  Next Appointment: 11/8/22 Beatrice Daniel  Previous Refill Encounter(s): 4/11/22 90 + 1    Requested Prescriptions     Pending Prescriptions Disp Refills    losartan (COZAAR) 50 mg tablet 90 Tablet 1     Sig: Take 1 Tablet by mouth daily. For [address] [facility] in place:   Recommendation Provided To:    Intervention Detail: New Rx: 1, reason: Patient Preference  Gap Closed?:   Intervention Accepted By:   Time Spent (min): 5

## 2022-10-10 ENCOUNTER — TRANSCRIBE ORDER (OUTPATIENT)
Dept: SCHEDULING | Age: 67
End: 2022-10-10

## 2022-10-10 DIAGNOSIS — Z12.31 SCREENING MAMMOGRAM FOR HIGH-RISK PATIENT: Primary | ICD-10-CM

## 2022-11-11 NOTE — PROGRESS NOTES
Chief Complaint   Patient presents with    Medication Evaluation     Reviewed record in preparation for visit and have obtained necessary documentation. Identified pt with two pt identifiers(name and ). Health Maintenance Due   Topic    HEMOGLOBIN A1C Q6M     FOOT EXAM Q1     COLONOSCOPY          Chief Complaint   Patient presents with    Medication Evaluation        Wt Readings from Last 3 Encounters:   19 182 lb 9.6 oz (82.8 kg)   19 179 lb (81.2 kg)   19 179 lb (81.2 kg)     Temp Readings from Last 3 Encounters:   19 98.6 °F (37 °C) (Oral)   19 99.1 °F (37.3 °C) (Oral)   19 98.4 °F (36.9 °C) (Oral)     BP Readings from Last 3 Encounters:   19 140/76   19 130/80   19 130/66     Pulse Readings from Last 3 Encounters:   19 74   19 82   19 84           Learning Assessment:  :     Learning Assessment 2019   PRIMARY LEARNER Patient Spouse   HIGHEST LEVEL OF EDUCATION - PRIMARY LEARNER  SOME COLLEGE SOME COLLEGE   PRIMARY LANGUAGE ENGLISH ENGLISH   LEARNER PREFERENCE PRIMARY READING VIDEOS   ANSWERED BY patient spouse   RELATIONSHIP SELF SPOUSE       Depression Screening:  :     3 most recent PHQ Screens 2019   Little interest or pleasure in doing things Not at all   Feeling down, depressed, irritable, or hopeless Not at all   Total Score PHQ 2 0       Fall Risk Assessment:  :     No flowsheet data found. Abuse Screening:  :     Abuse Screening Questionnaire 2019   Do you ever feel afraid of your partner? N N   Are you in a relationship with someone who physically or mentally threatens you? N N   Is it safe for you to go home?  Y Y       Coordination of Care Questionnaire:  :     1) Have you been to an emergency room, urgent care clinic since your last visit? no   Hospitalized since your last visit? no             2) Have you seen or consulted any other health care providers outside of [facility] Pt called back. She was given the names and numbers for Jalen Goss and [facility].    Health System since your last visit? yes  (Include any pap smears or colon screenings in this section.)    3) Do you have an Advance Directive on file? no    4) Are you interested in receiving information on Advance Directives? NO      Patient is accompanied by self I have received verbal consent from Armaan Barreto to discuss any/all medical information while they are present in the room. Reviewed record  In preparation for visit and have obtained necessary documentation.

## 2022-11-15 ENCOUNTER — TELEPHONE (OUTPATIENT)
Dept: FAMILY MEDICINE CLINIC | Age: 67
End: 2022-11-15

## 2022-11-15 NOTE — TELEPHONE ENCOUNTER
----- Message from Mel Crisostomo sent at 11/15/2022 10:47 AM EST -----  Subject: Message to Provider    QUESTIONS  Information for Provider? Patient has a pre op scheduled for 11/21/22 for   her foot surgery with Dr Phil Thomas at [facility] on 12/16/22. She   will need an EKG, blood work and an order for a chest x-ray for her pre op   visit. She will also need copies of her medical history/physical forms. Her surgeon also wants to add vitamin D to her medication list.   ---------------------------------------------------------------------------  --------------  Krishna LARA  [phone]; OK to leave message on voicemail  ---------------------------------------------------------------------------  --------------  SCRIPT ANSWERS  Relationship to Patient?  Self

## 2022-11-21 ENCOUNTER — OFFICE VISIT (OUTPATIENT)
Dept: FAMILY MEDICINE CLINIC | Age: 67
End: 2022-11-21
Payer: MEDICARE

## 2022-11-21 VITALS
HEART RATE: 72 BPM | RESPIRATION RATE: 14 BRPM | BODY MASS INDEX: 33.64 KG/M2 | HEIGHT: 61 IN | WEIGHT: 178.2 LBS | SYSTOLIC BLOOD PRESSURE: 144 MMHG | TEMPERATURE: 97.7 F | DIASTOLIC BLOOD PRESSURE: 80 MMHG | OXYGEN SATURATION: 100 %

## 2022-11-21 DIAGNOSIS — K30 INDIGESTION: ICD-10-CM

## 2022-11-21 DIAGNOSIS — H26.9 CATARACT, UNSPECIFIED CATARACT TYPE, UNSPECIFIED LATERALITY: ICD-10-CM

## 2022-11-21 DIAGNOSIS — E11.3313 MODERATE NONPROLIFERATIVE DIABETIC RETINOPATHY OF BOTH EYES WITH MACULAR EDEMA ASSOCIATED WITH TYPE 2 DIABETES MELLITUS (HCC): ICD-10-CM

## 2022-11-21 DIAGNOSIS — Z01.818 PREOPERATIVE EXAMINATION: ICD-10-CM

## 2022-11-21 DIAGNOSIS — I10 PRIMARY HYPERTENSION: ICD-10-CM

## 2022-11-21 DIAGNOSIS — Z00.00 MEDICARE ANNUAL WELLNESS VISIT, SUBSEQUENT: Primary | ICD-10-CM

## 2022-11-21 DIAGNOSIS — I10 ESSENTIAL HYPERTENSION: ICD-10-CM

## 2022-11-21 DIAGNOSIS — E11.9 TYPE 2 DIABETES MELLITUS WITHOUT COMPLICATION, WITHOUT LONG-TERM CURRENT USE OF INSULIN (HCC): ICD-10-CM

## 2022-11-21 DIAGNOSIS — E55.9 VITAMIN D DEFICIENCY: ICD-10-CM

## 2022-11-21 DIAGNOSIS — E78.49 OTHER HYPERLIPIDEMIA: ICD-10-CM

## 2022-11-21 PROCEDURE — 3017F COLORECTAL CA SCREEN DOC REV: CPT | Performed by: FAMILY MEDICINE

## 2022-11-21 PROCEDURE — 99214 OFFICE O/P EST MOD 30 MIN: CPT | Performed by: FAMILY MEDICINE

## 2022-11-21 PROCEDURE — G8510 SCR DEP NEG, NO PLAN REQD: HCPCS | Performed by: FAMILY MEDICINE

## 2022-11-21 PROCEDURE — G8753 SYS BP > OR = 140: HCPCS | Performed by: FAMILY MEDICINE

## 2022-11-21 PROCEDURE — 3044F HG A1C LEVEL LT 7.0%: CPT | Performed by: FAMILY MEDICINE

## 2022-11-21 PROCEDURE — 93010 ELECTROCARDIOGRAM REPORT: CPT | Performed by: FAMILY MEDICINE

## 2022-11-21 PROCEDURE — G8399 PT W/DXA RESULTS DOCUMENT: HCPCS | Performed by: FAMILY MEDICINE

## 2022-11-21 PROCEDURE — 2022F DILAT RTA XM EVC RTNOPTHY: CPT | Performed by: FAMILY MEDICINE

## 2022-11-21 PROCEDURE — G8536 NO DOC ELDER MAL SCRN: HCPCS | Performed by: FAMILY MEDICINE

## 2022-11-21 PROCEDURE — G8427 DOCREV CUR MEDS BY ELIG CLIN: HCPCS | Performed by: FAMILY MEDICINE

## 2022-11-21 PROCEDURE — 1123F ACP DISCUSS/DSCN MKR DOCD: CPT | Performed by: FAMILY MEDICINE

## 2022-11-21 PROCEDURE — G9899 SCRN MAM PERF RSLTS DOC: HCPCS | Performed by: FAMILY MEDICINE

## 2022-11-21 PROCEDURE — 3078F DIAST BP <80 MM HG: CPT | Performed by: FAMILY MEDICINE

## 2022-11-21 PROCEDURE — G8417 CALC BMI ABV UP PARAM F/U: HCPCS | Performed by: FAMILY MEDICINE

## 2022-11-21 PROCEDURE — 1101F PT FALLS ASSESS-DOCD LE1/YR: CPT | Performed by: FAMILY MEDICINE

## 2022-11-21 PROCEDURE — 1090F PRES/ABSN URINE INCON ASSESS: CPT | Performed by: FAMILY MEDICINE

## 2022-11-21 PROCEDURE — 3074F SYST BP LT 130 MM HG: CPT | Performed by: FAMILY MEDICINE

## 2022-11-21 PROCEDURE — G0439 PPPS, SUBSEQ VISIT: HCPCS | Performed by: FAMILY MEDICINE

## 2022-11-21 PROCEDURE — 93005 ELECTROCARDIOGRAM TRACING: CPT | Performed by: FAMILY MEDICINE

## 2022-11-21 PROCEDURE — G8754 DIAS BP LESS 90: HCPCS | Performed by: FAMILY MEDICINE

## 2022-11-21 PROCEDURE — G0463 HOSPITAL OUTPT CLINIC VISIT: HCPCS | Performed by: FAMILY MEDICINE

## 2022-11-21 RX ORDER — LOSARTAN POTASSIUM 50 MG/1
75 TABLET ORAL DAILY
Qty: 135 TABLET | Refills: 1 | Status: SHIPPED | OUTPATIENT
Start: 2022-11-21 | End: 2022-12-01 | Stop reason: SDUPTHER

## 2022-11-21 RX ORDER — CHOLECALCIFEROL (VITAMIN D3) 125 MCG
CAPSULE ORAL
COMMUNITY

## 2022-11-21 RX ORDER — LANCETS 33 GAUGE
EACH MISCELLANEOUS
Qty: 100 EACH | Refills: 1 | Status: SHIPPED | OUTPATIENT
Start: 2022-11-21 | End: 2022-11-22 | Stop reason: SDUPTHER

## 2022-11-21 NOTE — PROGRESS NOTES
Chief Complaint   Patient presents with    Pre-op Exam     Foot surgery     1. Have you been to the ER, urgent care clinic since your last visit? Hospitalized since your last visit? No    2. Have you seen or consulted any other health care providers outside of the [address] [place] [address] since your last visit? Include any pap smears or colon screening.  No

## 2022-11-21 NOTE — PATIENT INSTRUCTIONS
Medicare Wellness Visit, Female     The best way to live healthy is to have a lifestyle where you eat a well-balanced diet, exercise regularly, limit alcohol use, and quit all forms of tobacco/nicotine, if applicable. Regular preventive services are another way to keep healthy. Preventive services (vaccines, screening tests, monitoring & exams) can help personalize your care plan, which helps you manage your own care. Screening tests can find health problems at the earliest stages, when they are easiest to treat. [facility] follows the current, evidence-based guidelines published by the [facility] Sukhdev Calderon ([facility]) when recommending preventive services for our patients. Because we follow these guidelines, sometimes recommendations change over time as research supports it. (For example, mammograms used to be recommended annually. Even though Medicare will still pay for an annual mammogram, the newer guidelines recommend a mammogram every two years for women of average risk). Of course, you and your doctor may decide to screen more often for some diseases, based on your risk and your co-morbidities (chronic disease you are already diagnosed with). Preventive services for you include:  - Medicare offers their members a free annual wellness visit, which is time for you and your primary care provider to discuss and plan for your preventive service needs. Take advantage of this benefit every year!  -All adults over the age of 72 should receive the recommended pneumonia vaccines. Current USPSTF guidelines recommend a series of two vaccines for the best pneumonia protection.   -All adults should have a flu vaccine yearly and a tetanus vaccine every 10 years.   -All adults age 48 and older should receive the shingles vaccines (series of two vaccines).       -All adults age 38-68 who are overweight should have a diabetes screening test once every three years.   -All adults born between [birth year] and 1965 should be screened once for Hepatitis C.  -Other screening tests and preventive services for persons with diabetes include: an eye exam to screen for diabetic retinopathy, a kidney function test, a foot exam, and stricter control over your cholesterol.   -Cardiovascular screening for adults with routine risk involves an electrocardiogram (ECG) at intervals determined by your doctor.   -Colorectal cancer screenings should be done for adults age 54-65 with no increased risk factors for colorectal cancer. There are a number of acceptable methods of screening for this type of cancer. Each test has its own benefits and drawbacks. Discuss with your doctor what is most appropriate for you during your annual wellness visit. The different tests include: colonoscopy (considered the best screening method), a fecal occult blood test, a fecal DNA test, and sigmoidoscopy.    -A bone mass density test is recommended when a woman turns 65 to screen for osteoporosis. This test is only recommended one time, as a screening. Some providers will use this same test as a disease monitoring tool if you already have osteoporosis. -Breast cancer screenings are recommended every other year for women of normal risk, age 54-69.  -Cervical cancer screenings for women over age 72 are only recommended with certain risk factors.      Here is a list of your current Health Maintenance items (your personalized list of preventive services) with a due date:  Health Maintenance Due   Topic Date Due    Diabetic Foot Care  07/31/2021    COVID-19 Vaccine (5 - Booster for [employer] series) 11/17/2022

## 2022-11-21 NOTE — PROGRESS NOTES
This is the Subsequent Medicare Annual Wellness Exam, performed 12 months or more after the Initial AWV or the last Subsequent AWV    I have reviewed the patient's medical history in detail and updated the computerized patient record. Assessment/Plan   Education and counseling provided:  Are appropriate based on today's review and evaluation    Medicare Wellness Exam: Reviewed and addressed patient's medical history and concerns as discussed in note. Reviewed recommended screenings and immunizations. Discussed recommendations for diet, exercise, and lifestyle. Depression Risk Factor Screening     3 most recent PHQ Screens 11/21/2022   Little interest or pleasure in doing things Not at all   Feeling down, depressed, irritable, or hopeless Not at all   Total Score PHQ 2 0       Alcohol & Drug Abuse Risk Screen    Do you average more than 1 drink per night or more than 7 drinks a week:  No    On any one occasion in the past three months have you have had more than 3 drinks containing alcohol:  No          Functional Ability and Level of Safety    Hearing: Hearing is good. Activities of Daily Living: The home contains: handrails  Patient does total self care      Ambulation: with no difficulty     Fall Risk:  Fall Risk Assessment, last 12 mths 11/21/2022   Able to walk? Yes   Fall in past 12 months? 0   Do you feel unsteady? 0   Are you worried about falling 0   Is TUG test greater than 12 seconds? -   Is the gait abnormal? -   Number of falls in past 12 months -   Fall with injury?  -      Abuse Screen:  Patient is not abused       Cognitive Screening    Has your family/caregiver stated any concerns about your memory: no     Cognitive Screening: Normal - Clock Drawing Test    Health Maintenance Due     Health Maintenance Due   Topic Date Due    Foot Exam Q1  07/31/2021    COVID-19 Vaccine (5 - Booster for [employer]* series) 11/17/2022       Patient Care Team   Patient Care Team:  Drew Tran MD as PCP - General (Family Medicine)  Sharen Rubinstein, MD as PCP - [address] [place] [address] [facility]  Sharon Dean MD (Obstetrics & Gynecology)  Arnold Doran MD (Colon and Rectal Surgery)  Jeferson Valentino MD as Physician (Dermatology Physician)  Domenic Kehr, NP (General Surgery)    History     Patient Active Problem List   Diagnosis Code    Type II diabetes mellitus ([facility] E11.9    History of basal cell carcinoma Z85.828    History of colonoscopy Z98.890    Diverticulosis K57.90    Actinic keratosis L57.0    Vitamin D insufficiency E55.9    Other hyperlipidemia E78.49    Severe obesity ([address] E66.01    H/O: hysterectomy Z90.710    Moderate nonproliferative diabetic retinopathy of both eyes with macular edema associated with type 2 diabetes mellitus ([address] H27.2689    Age-related nuclear cataract of both eyes H25.13    Primary hypertension I10     Past Medical History:   Diagnosis Date    Calculus of kidney     Diabetes ([facility]     Headache       Past Surgical History:   Procedure Laterality Date    HX BREAST BIOPSY Right     neg    HX CYST INCISION AND DRAINAGE Bilateral     HX CYST REMOVAL  1980    HX GYN  2003    hysterectomy    HX LAP CHOLECYSTECTOMY  05/20/2021    by Dr. Ngozi Escobar at [facility]     Current Outpatient Medications   Medication Sig Dispense Refill    cholecalciferol, vitamin D3, (Vitamin D3) 50 mcg (2,000 unit) tab Take  by mouth.      losartan (COZAAR) 50 mg tablet Take 1.5 Tablets by mouth daily. 135 Tablet 1    metFORMIN ER (GLUCOPHAGE XR) 500 mg tablet Take 3 Tablets by mouth daily (with dinner). 270 Tablet 1    rosuvastatin (CRESTOR) 5 mg tablet Take 1 Tablet by mouth nightly. 90 Tablet 2    glucose blood VI test strips (blood glucose test) strip Per insurance preference, test 1x/d dx diabetes E11.9 100 Strip 3    OneTouch Delica Plus Lancet 33 gauge misc       famotidine (PEPCID) 20 mg tablet Take 1 Tablet by mouth daily as needed for Heartburn.  90 Tablet 1 lancets misc Per insurance preference, test 1x/d dx diabetes E11.9 100 Each 3    ibuprofen (MOTRIN) 800 mg tablet Take 1 Tablet by mouth every six (6) hours as needed for Pain. 30 Tablet 0    Blood-Glucose Meter monitoring kit Use to check fasting and meal time blood sugar daily. Patient may use whichever product is covered by insurance. 1 Kit 0     Allergies   Allergen Reactions    Codeine Other (comments)     Passed out    Flomax [Tamsulosin] Other (comments)     presyncope    Lisinopril Diarrhea    Tetanus And Diphther.  Tox (Pf) Swelling       Family History   Problem Relation Age of Onset    Gall Bladder Disease Mother     Cancer Father         colon, liver    Stroke Father         TIAs    Parkinson's Disease Father     Migraines Father      Social History     Tobacco Use    Smoking status: Never    Smokeless tobacco: Never   Substance Use Topics    Alcohol use: No         Nasir Ely MD

## 2022-11-21 NOTE — PROGRESS NOTES
Trinity Best  79 y.o. female  1955  Keerthi Barlow  [id number]     [address] [facility]       Encounter Date: 11/21/2022           Established Patient Visit Note: Imani Blue MD    Reason for Appointment:  Chief Complaint   Patient presents with    Pre-op Exam     Foot surgery         History of Present Illness:  History provided by patient    Trinity Best is a 79 y.o. female who presents to clinic today for:     Preoperative Evaluation: see associated media, Arthrodesis of right 1st MTP joint surgery scheduled for 12/16/22 with Dr. Verdin Deer: continue PRN pepcid  HTN: she reports good adherence to medication. She is trying to limit salt intake  Obesity: She continues to work on diet  DM2: last A1c was 6.9 on 8/3/22; continues metformin  HLD: tolerating Crestor with no side effects  Osteopenia: she had DEXA on 9/23/21 with Frax 1.6% HF and 10.4% MOF. She is taking calcium and vitamin d  Cataract and Retinal Abnormality: Ophthalmologist: [facility] (Dr. Renetta Jones) is eye doctor, and retinal specialist is Dr. Berna España   Hx of BCC: Followed by dermatology (Dr. Prachi Rendon)           Review of Systems  All other ROS were reviewed and are negative except as discussed in HPI            Allergies: Codeine, Flomax [tamsulosin], Lisinopril, and Tetanus and diphther. tox (pf)    Medications:     Current Outpatient Medications:     cholecalciferol, vitamin D3, (Vitamin D3) 50 mcg (2,000 unit) tab, Take  by mouth., Disp: , Rfl:     losartan (COZAAR) 50 mg tablet, Take 1.5 Tablets by mouth daily. , Disp: 135 Tablet, Rfl: 1    metFORMIN ER (GLUCOPHAGE XR) 500 mg tablet, Take 3 Tablets by mouth daily (with dinner). , Disp: 270 Tablet, Rfl: 1    rosuvastatin (CRESTOR) 5 mg tablet, Take 1 Tablet by mouth nightly., Disp: 90 Tablet, Rfl: 2    glucose blood VI test strips (blood glucose test) strip, Per insurance preference, test 1x/d dx diabetes E11.9, Disp: 100 Strip, Rfl: 3    famotidine (PEPCID) 20 mg tablet, Take 1 Tablet by mouth daily as needed for Heartburn., Disp: 90 Tablet, Rfl: 1    lancets misc, Per insurance preference, test 1x/d dx diabetes E11.9, Disp: 100 Each, Rfl: 3    ibuprofen (MOTRIN) 800 mg tablet, Take 1 Tablet by mouth every six (6) hours as needed for Pain., Disp: 30 Tablet, Rfl: 0    Blood-Glucose Meter monitoring kit, Use to check fasting and meal time blood sugar daily. Patient may use whichever product is covered by insurance., Disp: 1 Kit, Rfl: 0    OneTouch Delica Plus Lancet 33 gauge misc, Use to check blood sugars two times daily  Dx E11.9, Disp: 100 Each, Rfl: 1    History  Patient Care Team:  Augie Barrientos MD as PCP - General (Family Medicine)  Augie Barrientos MD as PCP - [facility]  Annika Nowak MD (Obstetrics & Gynecology)  Cecily Brewster MD (Colon and Rectal Surgery)  Mita Berg MD as Physician (Dermatology Physician)  Jerrod Chávez NP (General Surgery)    Past Medical History: she has a past medical history of Calculus of kidney, Diabetes ([address] and Headache. Past Surgical History: she has a past surgical history that includes pr breast surgery procedure unlisted (1999); hx gyn (2003); hx cyst removal (1980); hx lap cholecystectomy (05/20/2021); hx breast biopsy (Right); and hx cyst incision and drainage (Bilateral). Family Medical History: family history includes Cancer in her father; Ruma Willett in her mother; Migraines in her father; Parkinson's Disease in her father; Stroke in her father. Social History: she reports that she has never smoked. She has never used smokeless tobacco. She reports that she does not drink alcohol and does not use drugs.         Objective:   Visit Vitals  BP (!) 144/80 (BP 1 Location: Left arm, BP Patient Position: Sitting, BP Cuff Size: Adult)   Pulse 72   Temp 97.7 °F (36.5 °C) (Temporal)   Resp 14   Ht 5' 1\" (1.549 m)   Wt 178 lb 3.2 oz (80.8 kg) SpO2 100%   BMI 33.67 kg/m²     Wt Readings from Last 3 Encounters:   11/21/22 178 lb 3.2 oz (80.8 kg)   08/03/22 180 lb 3.2 oz (81.7 kg)   04/11/22 187 lb 3.2 oz (84.9 kg)       Physical Exam  Vitals reviewed. Constitutional:       General: She is not in acute distress. Appearance: Normal appearance. She is normal weight. She is not ill-appearing or toxic-appearing. HENT:      Head: Normocephalic and atraumatic. Right Ear: Hearing, tympanic membrane, ear canal and external ear normal. No drainage. No middle ear effusion. There is no impacted cerumen. Tympanic membrane is not injected or erythematous. Left Ear: Hearing, tympanic membrane, ear canal and external ear normal. No drainage. No middle ear effusion. There is no impacted cerumen. Tympanic membrane is not injected or erythematous. Nose: Nose normal. No nasal deformity or septal deviation. Mouth/Throat:      Lips: Pink. No lesions. Mouth: Mucous membranes are moist.      Palate: No mass. Pharynx: Oropharynx is clear. Uvula midline. No pharyngeal swelling, oropharyngeal exudate or posterior oropharyngeal erythema. Tonsils: No tonsillar exudate. Eyes:      General: Lids are normal. Vision grossly intact. Gaze aligned appropriately. Right eye: No discharge. Left eye: No discharge. Extraocular Movements: Extraocular movements intact. Conjunctiva/sclera: Conjunctivae normal.      Right eye: Right conjunctiva is not injected. Left eye: Left conjunctiva is not injected. Pupils: Pupils are equal, round, and reactive to light. Neck:      Vascular: No carotid bruit. Cardiovascular:      Rate and Rhythm: Normal rate and regular rhythm. Pulses: Normal pulses. Heart sounds: Normal heart sounds. No murmur heard. No friction rub. No gallop. Pulmonary:      Effort: Pulmonary effort is normal. No respiratory distress. Breath sounds: Normal breath sounds. No stridor.  No wheezing, rhonchi or rales. Abdominal:      General: Bowel sounds are normal. There is no distension or abdominal bruit. Tenderness: There is no abdominal tenderness. There is no guarding. Musculoskeletal:         General: Normal range of motion. Cervical back: Normal range of motion and neck supple. No rigidity. No muscular tenderness. Lymphadenopathy:      Cervical: No cervical adenopathy. Skin:     General: Skin is warm. Coloration: Skin is not jaundiced. Neurological:      General: No focal deficit present. Mental Status: She is alert. Motor: Motor function is intact. Coordination: Coordination is intact. Gait: Gait is intact. Deep Tendon Reflexes:      Reflex Scores:       Patellar reflexes are 2+ on the right side and 2+ on the left side. Psychiatric:         Attention and Perception: Attention and perception normal.         Mood and Affect: Mood and affect normal.         Speech: Speech normal.         Cognition and Memory: Cognition and memory normal.       Assessment & Plan:      ICD-10-CM ICD-9-CM    1. Preoperative examination  Z01.818 V72.84 AMB POC EKG ROUTINE W/ 12 LEADS, INTER & REP      XR CHEST PA LAT      2. Primary hypertension  I10 401.9 CBC W/O DIFF      METABOLIC PANEL, COMPREHENSIVE      TSH 3RD GENERATION      losartan (COZAAR) 50 mg tablet      TSH 3RD GENERATION      METABOLIC PANEL, COMPREHENSIVE      CBC W/O DIFF      3. Type 2 diabetes mellitus without complication, without long-term current use of insulin (HCC)  E11.9 250.00 HEMOGLOBIN A1C WITH EAG      HEMOGLOBIN A1C WITH EAG      4. Other hyperlipidemia  E78.49 272.4 LIPID PANEL      LIPID PANEL      5. Vitamin D deficiency  E55.9 268.9 VITAMIN D, 25 HYDROXY      VITAMIN D, 25 HYDROXY      6. Medicare annual wellness visit, subsequent  Z00.00 V70.0       7. Indigestion  K30 536.8       8. Essential hypertension  I10 401.9       9.  Moderate nonproliferative diabetic retinopathy of both eyes with macular edema associated with type 2 diabetes mellitus ([facility]  E11.3313 250.50      362.07      362.05       10. Cataract, unspecified cataract type, unspecified laterality  H26.9 366.9         Preoperative Examination: Preoperative Examination: Patient average risk for age/sex/comorbidity for foot Surgery under general anesthesia. See plan for HTN below  HTN: Chronic, uncontrolled: Increase losartan from 50mg to 75mg daily and recheck BP in one week. All other conditions listed above: Chronic, stable and/or managed by specialist. Will continue current treatment regimen. Will check labs as reflected above. Discussed following up with specialist as scheduled. Discussed recommendations for diet and exercise. I have discussed the diagnosis with the patient and the intended plan as seen in the above orders. The patient has received an after-visit summary along with patient information handout. I have discussed medication side effects and warnings with the patient as well. Disposition  Follow-up and Dispositions    Return for one week for nurse BP check and Jan, 2023 for office visit.            Elana Carvajal MD

## 2022-11-21 NOTE — TELEPHONE ENCOUNTER
When pt was checking out she informed me that she is completely out of her One Touch Delica Plus Lancet's. Pt tried to place order  on My Chart would not allow her to place order. Pt would like if  could approve it today being that she is out?

## 2022-11-22 DIAGNOSIS — E11.9 TYPE 2 DIABETES MELLITUS WITHOUT COMPLICATION, WITHOUT LONG-TERM CURRENT USE OF INSULIN (HCC): Primary | ICD-10-CM

## 2022-11-22 LAB
25(OH)D3 SERPL-MCNC: 45.4 NG/ML (ref 30–100)
ALBUMIN SERPL-MCNC: 3.8 G/DL (ref 3.5–5)
ALBUMIN/GLOB SERPL: 1.2 {RATIO} (ref 1.1–2.2)
ALP SERPL-CCNC: 79 U/L (ref 45–117)
ALT SERPL-CCNC: 12 U/L (ref 12–78)
ANION GAP SERPL CALC-SCNC: 5 MMOL/L (ref 5–15)
AST SERPL-CCNC: 10 U/L (ref 15–37)
BILIRUB SERPL-MCNC: 0.5 MG/DL (ref 0.2–1)
BUN SERPL-MCNC: 12 MG/DL (ref 6–20)
BUN/CREAT SERPL: 17 (ref 12–20)
CALCIUM SERPL-MCNC: 9.4 MG/DL (ref 8.5–10.1)
CHLORIDE SERPL-SCNC: 108 MMOL/L (ref 97–108)
CHOLEST SERPL-MCNC: 158 MG/DL
CO2 SERPL-SCNC: 28 MMOL/L (ref 21–32)
CREAT SERPL-MCNC: 0.69 MG/DL (ref 0.55–1.02)
ERYTHROCYTE [DISTWIDTH] IN BLOOD BY AUTOMATED COUNT: 12.4 % (ref 11.5–14.5)
EST. AVERAGE GLUCOSE BLD GHB EST-MCNC: 137 MG/DL
GLOBULIN SER CALC-MCNC: 3.2 G/DL (ref 2–4)
GLUCOSE SERPL-MCNC: 116 MG/DL (ref 65–100)
HBA1C MFR BLD: 6.4 % (ref 4–5.6)
HCT VFR BLD AUTO: 41.4 % (ref 35–47)
HDLC SERPL-MCNC: 77 MG/DL
HDLC SERPL: 2.1 {RATIO} (ref 0–5)
HGB BLD-MCNC: 12.8 G/DL (ref 11.5–16)
LDLC SERPL CALC-MCNC: 65.2 MG/DL (ref 0–100)
MCH RBC QN AUTO: 29.6 PG (ref 26–34)
MCHC RBC AUTO-ENTMCNC: 30.9 G/DL (ref 30–36.5)
MCV RBC AUTO: 95.6 FL (ref 80–99)
NRBC # BLD: 0 K/UL (ref 0–0.01)
NRBC BLD-RTO: 0 PER 100 WBC
PLATELET # BLD AUTO: 234 K/UL (ref 150–400)
PMV BLD AUTO: 11.5 FL (ref 8.9–12.9)
POTASSIUM SERPL-SCNC: 4.5 MMOL/L (ref 3.5–5.1)
PROT SERPL-MCNC: 7 G/DL (ref 6.4–8.2)
RBC # BLD AUTO: 4.33 M/UL (ref 3.8–5.2)
SODIUM SERPL-SCNC: 141 MMOL/L (ref 136–145)
TRIGL SERPL-MCNC: 79 MG/DL (ref ?–150)
TSH SERPL DL<=0.05 MIU/L-ACNC: 0.72 UIU/ML (ref 0.36–3.74)
VLDLC SERPL CALC-MCNC: 15.8 MG/DL
WBC # BLD AUTO: 7.1 K/UL (ref 3.6–11)

## 2022-11-22 RX ORDER — LANCETS 33 GAUGE
EACH MISCELLANEOUS
Qty: 100 EACH | Refills: 1 | Status: SHIPPED | OUTPATIENT
Start: 2022-11-22

## 2022-11-22 NOTE — TELEPHONE ENCOUNTER
Per pharmacy, a diagnosis code is required for Medicare billing purposes. Rx has been pended with this indicated. Requested Prescriptions     Pending Prescriptions Disp Refills    OneTouch Delica Plus Lancet 33 gauge misc 100 Each 1     Sig: Use to check blood sugars two times daily  Dx E11.9         For Pharmacy [facility] in place:   Recommendation Provided To:    Intervention Detail: New Rx: 1, reason: Needs Additional Therapy  Gap Closed?:   Intervention Accepted By:   Time Spent (min): 5

## 2022-11-23 NOTE — PROGRESS NOTES
Dear Andrés Díaz,    Your A1c is improved from last check. Great work. The remainder of  your labs are stable. If you have any questions, please feel free to call our office at [phone].      Leonarda De La Torre MD

## 2022-11-28 ENCOUNTER — HOSPITAL ENCOUNTER (OUTPATIENT)
Dept: MAMMOGRAPHY | Age: 67
Discharge: HOME | End: 2022-11-28
Attending: FAMILY MEDICINE
Payer: MEDICARE

## 2022-11-28 DIAGNOSIS — Z12.31 SCREENING MAMMOGRAM FOR HIGH-RISK PATIENT: ICD-10-CM

## 2022-11-28 PROCEDURE — 77063 BREAST TOMOSYNTHESIS BI: CPT

## 2022-12-01 ENCOUNTER — CLINICAL SUPPORT (OUTPATIENT)
Dept: FAMILY MEDICINE CLINIC | Age: 67
End: 2022-12-01

## 2022-12-01 VITALS — SYSTOLIC BLOOD PRESSURE: 142 MMHG | DIASTOLIC BLOOD PRESSURE: 78 MMHG

## 2022-12-01 DIAGNOSIS — I10 PRIMARY HYPERTENSION: ICD-10-CM

## 2022-12-01 RX ORDER — LOSARTAN POTASSIUM 100 MG/1
100 TABLET ORAL DAILY
Qty: 90 TABLET | Refills: 1 | Status: SHIPPED | OUTPATIENT
Start: 2022-12-01

## 2022-12-01 NOTE — PROGRESS NOTES
Chief Complaint   Patient presents with    Blood Pressure Check     Patient arrived for BP check only. Notified dr. Farhad Christianson.

## 2022-12-08 ENCOUNTER — CLINICAL SUPPORT (OUTPATIENT)
Dept: FAMILY MEDICINE CLINIC | Age: 67
End: 2022-12-08

## 2022-12-08 VITALS — DIASTOLIC BLOOD PRESSURE: 72 MMHG | SYSTOLIC BLOOD PRESSURE: 146 MMHG

## 2022-12-08 NOTE — PROGRESS NOTES
Chief Complaint   Patient presents with    Blood Pressure Check     Patient arrived for BP check only. Notified dr. Maye Montero.

## 2022-12-12 ENCOUNTER — CLINICAL SUPPORT (OUTPATIENT)
Dept: FAMILY MEDICINE CLINIC | Age: 67
End: 2022-12-12

## 2022-12-12 ENCOUNTER — TELEPHONE (OUTPATIENT)
Dept: FAMILY MEDICINE CLINIC | Age: 67
End: 2022-12-12

## 2022-12-12 VITALS — SYSTOLIC BLOOD PRESSURE: 138 MMHG | DIASTOLIC BLOOD PRESSURE: 82 MMHG

## 2022-12-12 DIAGNOSIS — I10 PRIMARY HYPERTENSION: Primary | ICD-10-CM

## 2022-12-12 NOTE — PROGRESS NOTES
Chief Complaint   Patient presents with    Blood Pressure Check     Patient arrived only for BP check. Dr. Crow [place] notified.

## 2022-12-14 NOTE — PERIOP NOTES
[address] [employer] INSTRUCTIONS    Surgery Date:   12/16/22    Your surgeon's office or [facility] staff will call you between 4 PM- 8 PM the day before surgery with your arrival time. If your surgery is on a Monday, you will receive a call the preceding Friday. Please report to [facility] Patient Access/Admitting on the 1st floor. Bring your insurance card, photo identification, and any copayment ( if applicable). If you are going home the same day of your surgery, you must have a responsible adult to drive you home. You need to have a responsible adult to stay with you the first 24 hours after surgery and you should not drive a car for 24 hours following your surgery. Nothing to eat or drink after midnight the night before surgery. This includes no water, gum, mints, coffee, juice, etc.  Please note special instructions, if applicable, below for medications. Do NOT drink alcohol or smoke 24 hours before surgery. STOP smoking for 14 days prior as it helps with breathing and healing after surgery. If you are being admitted to the hospital, please leave personal belongings/luggage in your car until you have an assigned hospital room number. Please wear comfortable clothes. Wear your glasses instead of contacts. We ask that all money, jewelry and valuables be left at home. Wear no make up, particularly mascara, the day of surgery. All body piercings, rings, and jewelry need to be removed and left at home. Please remove any nail polish or artificial nails from your fingernails. Please wear your hair loose or down. Please no pony-tails, buns, or any metal hair accessories. If you shower the morning of surgery, please do not apply any lotions or powders afterwards. You may wear deodorant, unless having breast surgery. Do not shave any body area within 24 hours of your surgery. Please follow all instructions to avoid any potential surgical cancellation.   Should your physical condition change, (i.e. fever, cold, flu, etc.) please notify your surgeon as soon as possible. It is important to be on time. If a situation occurs where you may be delayed, please call:  [phone] / [phone] on the day of surgery. The Preadmission Testing staff can be reached at [phone]. Special instructions: NONE      No current facility-administered medications for this encounter. Current Outpatient Medications   Medication Sig    losartan (COZAAR) 100 mg tablet Take 1 Tablet by mouth daily. OneTouch Delica Plus Lancet 33 gauge misc Use to check blood sugars two times daily  Dx E11.9    cholecalciferol, vitamin D3, 50 mcg (2,000 unit) tab Take  by mouth.    metFORMIN ER (GLUCOPHAGE XR) 500 mg tablet Take 3 Tablets by mouth daily (with dinner). rosuvastatin (CRESTOR) 5 mg tablet Take 1 Tablet by mouth nightly. glucose blood VI test strips (blood glucose test) strip Per insurance preference, test 1x/d dx diabetes E11.9    famotidine (PEPCID) 20 mg tablet Take 1 Tablet by mouth daily as needed for Heartburn. lancets misc Per insurance preference, test 1x/d dx diabetes E11.9    Blood-Glucose Meter monitoring kit Use to check fasting and meal time blood sugar daily. Patient may use whichever product is covered by insurance. YOU MUST ONLY TAKE THESE MEDICATIONS THE MORNING OF SURGERY WITH A SIP OF WATER: NONE  MEDICATIONS TO TAKE THE MORNING OF SURGERY ONLY IF NEEDED: FAMOTIDINE  HOLD these prescription medications BEFORE Surgery: DO NOT TAKE LOSARTAN THE DAY OF SURGERY,  HOLD METFORMIN 24 HRS PRIOR TO SURGERY (NONE ON 12/15/22)  Ask your surgeon/prescribing physician about when/if to STOP taking these medications: NONE  Stop all vitamins, herbal medicines and Aspirin containing products 7 days prior to surgery. Stop any non-steroidal anti-inflammatory drugs (i.e. Ibuprofen, Naproxen, Advil, Aleve) 3 days before surgery. You may take Tylenol.     If you are currently taking Plavix, Coumadin, or any other blood-thinning/anticoagulant medication contact your prescribing physician for instructions. Preventing Infections Before and After - Your Surgery    IMPORTANT INSTRUCTIONS      You play an important role in your health and preparation for surgery. To reduce the germs on your skin you will need to shower with CHG soap (Chorhexidine gluconate 4%) two times before surgery. CHG soap (Hibiclens, Hex-A-Clens or store brand)  CHG soap will be provided at your Preadmission Testing (PAT) appointment. If you do not have a PAT appointment before surgery, you may arrange to  CHG soap from our office or purchase CHG soap at a pharmacy, grocery or department store. You need to purchase TWO 4 ounce bottles to use for your 2 showers. Steps to follow:  Juline Aus your hair with your normal shampoo and your body with regular soap and rinse well to remove shampoo and soap from your skin. Wet a clean washcloth and turn off the shower. Put CHG soap on washcloth and apply to your entire body from the neck down. Do not use on your head, face or private parts(genitals). Do not use CHG soap on open sores, wounds or areas of skin irritation. Wash you body gently for 5 minutes. Do not wash your skin too hard. This soap does not create lather. Pay special attention to your underarms and from your belly button to your feet. Turn the shower back on and rinse well to get CHG soap off your body. Pat your skin dry with a clean, dry towel. Do not apply lotions or moisturizer. Put on clean clothes and sleep on fresh bed sheets and do not allow pets to sleep with you. Shower with CHG soap 2 times before your surgery  The evening before your surgery  The morning of your surgery      Tips to help prevent infections after your surgery:  Protect your surgical wound from germs:  Hand washing is the most important thing you and your caregivers can do to prevent infections. Keep your bandage clean and dry!   Do not touch your surgical wound. Use clean, freshly washed towels and washcloths every time you shower; do not share bath linens with others. Until your surgical wound is healed, wear clothing and sleep on bed linens each day that are clean and freshly washed. Do not allow pets to sleep in your bed with you or touch your surgical wound. Do not smoke - smoking delays wound healing. This may be a good time to stop smoking. If you have diabetes, it is important for you to manage your blood sugar levels properly before your surgery as well as after your surgery. Poorly managed blood sugar levels slow down wound healing and prevent you from healing completely. Patient Information Regarding COVID Restrictions    Day of Procedure    Please park in the parking deck or any designated visitor parking lot. Enter the facility through the Main Entrance of the hospital.  On the day of surgery, please provide the cell phone number of the person who will be waiting for you to the Patient Access representative at the time of registration. Masks are highly recommended in the hospital, but not required. Once your procedure and the immediate recovery period is completed, a nurse in the recovery area will contact your designated visitor to inform them of your room number or to otherwise review other pertinent information regarding your care. Social distancing practices are strongly encouraged in waiting areas and the cafeteria. The patient was contacted  via phone. She verbalized understanding of all instructions does not  need reinforcement.

## 2022-12-16 ENCOUNTER — APPOINTMENT (OUTPATIENT)
Dept: GENERAL RADIOLOGY | Age: 67
End: 2022-12-16
Attending: PODIATRIST
Payer: MEDICARE

## 2022-12-16 ENCOUNTER — ANESTHESIA EVENT (OUTPATIENT)
Dept: SURGERY | Age: 67
End: 2022-12-16
Payer: MEDICARE

## 2022-12-16 ENCOUNTER — ANESTHESIA (OUTPATIENT)
Dept: SURGERY | Age: 67
End: 2022-12-16
Payer: MEDICARE

## 2022-12-16 ENCOUNTER — HOSPITAL ENCOUNTER (OUTPATIENT)
Age: 67
Setting detail: OUTPATIENT SURGERY
Discharge: HOME | End: 2022-12-16
Attending: PODIATRIST | Admitting: PODIATRIST
Payer: MEDICARE

## 2022-12-16 VITALS
HEART RATE: 75 BPM | BODY MASS INDEX: 32.1 KG/M2 | DIASTOLIC BLOOD PRESSURE: 70 MMHG | HEIGHT: 61 IN | SYSTOLIC BLOOD PRESSURE: 138 MMHG | RESPIRATION RATE: 17 BRPM | TEMPERATURE: 97.3 F | OXYGEN SATURATION: 95 % | WEIGHT: 170 LBS

## 2022-12-16 LAB
GLUCOSE BLD STRIP.AUTO-MCNC: 122 MG/DL (ref 65–117)
GLUCOSE BLD STRIP.AUTO-MCNC: 95 MG/DL (ref 65–117)
SERVICE CMNT-IMP: ABNORMAL
SERVICE CMNT-IMP: NORMAL

## 2022-12-16 PROCEDURE — 77030039825 HC MSK NSL PAP SUPERNO2VA VYRM -B: Performed by: STUDENT IN AN ORGANIZED HEALTH CARE EDUCATION/TRAINING PROGRAM

## 2022-12-16 PROCEDURE — 97161 PT EVAL LOW COMPLEX 20 MIN: CPT

## 2022-12-16 PROCEDURE — 74011250636 HC RX REV CODE- 250/636: Performed by: PODIATRIST

## 2022-12-16 PROCEDURE — 82962 GLUCOSE BLOOD TEST: CPT

## 2022-12-16 PROCEDURE — C1713 ANCHOR/SCREW BN/BN,TIS/BN: HCPCS | Performed by: PODIATRIST

## 2022-12-16 PROCEDURE — 73620 X-RAY EXAM OF FOOT: CPT

## 2022-12-16 PROCEDURE — 74011000250 HC RX REV CODE- 250: Performed by: PODIATRIST

## 2022-12-16 PROCEDURE — 77030031139 HC SUT VCRL2 J&J -A: Performed by: PODIATRIST

## 2022-12-16 PROCEDURE — 2709999900 HC NON-CHARGEABLE SUPPLY: Performed by: PODIATRIST

## 2022-12-16 PROCEDURE — 73630 X-RAY EXAM OF FOOT: CPT

## 2022-12-16 PROCEDURE — 76060000034 HC ANESTHESIA 1.5 TO 2 HR: Performed by: PODIATRIST

## 2022-12-16 PROCEDURE — 74011250636 HC RX REV CODE- 250/636: Performed by: NURSE ANESTHETIST, CERTIFIED REGISTERED

## 2022-12-16 PROCEDURE — 76210000016 HC OR PH I REC 1 TO 1.5 HR: Performed by: PODIATRIST

## 2022-12-16 PROCEDURE — C1769 GUIDE WIRE: HCPCS | Performed by: PODIATRIST

## 2022-12-16 PROCEDURE — 77030020274 HC MISC IMPL ORTHOPEDIC: Performed by: PODIATRIST

## 2022-12-16 PROCEDURE — 77030003747: Performed by: PODIATRIST

## 2022-12-16 PROCEDURE — 77030002933 HC SUT MCRYL J&J -A: Performed by: PODIATRIST

## 2022-12-16 PROCEDURE — 76210000021 HC REC RM PH II 0.5 TO 1 HR: Performed by: PODIATRIST

## 2022-12-16 PROCEDURE — 77030020268 HC MISC GENERAL SUPPLY: Performed by: PODIATRIST

## 2022-12-16 PROCEDURE — 77030020754 HC CUF TRNQT 2BLA STRY -B: Performed by: PODIATRIST

## 2022-12-16 PROCEDURE — 76010000153 HC OR TIME 1.5 TO 2 HR: Performed by: PODIATRIST

## 2022-12-16 PROCEDURE — 74011250637 HC RX REV CODE- 250/637: Performed by: STUDENT IN AN ORGANIZED HEALTH CARE EDUCATION/TRAINING PROGRAM

## 2022-12-16 PROCEDURE — 97116 GAIT TRAINING THERAPY: CPT

## 2022-12-16 DEVICE — IMPLANTABLE DEVICE: Type: IMPLANTABLE DEVICE | Site: FOOT | Status: FUNCTIONAL

## 2022-12-16 DEVICE — MAXFORCE MTP PLATE PETITE 0 DEGREE -0 DEGREE RIGHT: Type: IMPLANTABLE DEVICE | Site: FOOT | Status: FUNCTIONAL

## 2022-12-16 RX ORDER — ACETAMINOPHEN 325 MG/1
650 TABLET ORAL
Status: COMPLETED | OUTPATIENT
Start: 2022-12-16 | End: 2022-12-16

## 2022-12-16 RX ORDER — PHENYLEPHRINE HCL IN 0.9% NACL 0.4MG/10ML
SYRINGE (ML) INTRAVENOUS AS NEEDED
Status: DISCONTINUED | OUTPATIENT
Start: 2022-12-16 | End: 2022-12-16 | Stop reason: HOSPADM

## 2022-12-16 RX ORDER — PROPOFOL 10 MG/ML
INJECTION, EMULSION INTRAVENOUS
Status: DISCONTINUED | OUTPATIENT
Start: 2022-12-16 | End: 2022-12-16 | Stop reason: HOSPADM

## 2022-12-16 RX ORDER — MIDAZOLAM HYDROCHLORIDE 1 MG/ML
INJECTION, SOLUTION INTRAMUSCULAR; INTRAVENOUS AS NEEDED
Status: DISCONTINUED | OUTPATIENT
Start: 2022-12-16 | End: 2022-12-16 | Stop reason: HOSPADM

## 2022-12-16 RX ORDER — SODIUM CHLORIDE, SODIUM LACTATE, POTASSIUM CHLORIDE, CALCIUM CHLORIDE 600; 310; 30; 20 MG/100ML; MG/100ML; MG/100ML; MG/100ML
50 INJECTION, SOLUTION INTRAVENOUS CONTINUOUS
Status: DISCONTINUED | OUTPATIENT
Start: 2022-12-16 | End: 2022-12-16 | Stop reason: HOSPADM

## 2022-12-16 RX ORDER — FENTANYL CITRATE 50 UG/ML
INJECTION, SOLUTION INTRAMUSCULAR; INTRAVENOUS AS NEEDED
Status: DISCONTINUED | OUTPATIENT
Start: 2022-12-16 | End: 2022-12-16 | Stop reason: HOSPADM

## 2022-12-16 RX ORDER — ONDANSETRON 2 MG/ML
INJECTION INTRAMUSCULAR; INTRAVENOUS AS NEEDED
Status: DISCONTINUED | OUTPATIENT
Start: 2022-12-16 | End: 2022-12-16 | Stop reason: HOSPADM

## 2022-12-16 RX ORDER — PROPOFOL 10 MG/ML
INJECTION, EMULSION INTRAVENOUS AS NEEDED
Status: DISCONTINUED | OUTPATIENT
Start: 2022-12-16 | End: 2022-12-16 | Stop reason: HOSPADM

## 2022-12-16 RX ADMIN — FENTANYL CITRATE 50 MCG: 50 INJECTION INTRAMUSCULAR; INTRAVENOUS at 08:14

## 2022-12-16 RX ADMIN — SODIUM CHLORIDE, POTASSIUM CHLORIDE, SODIUM LACTATE AND CALCIUM CHLORIDE 50 ML/HR: 600; 310; 30; 20 INJECTION, SOLUTION INTRAVENOUS at 07:55

## 2022-12-16 RX ADMIN — PROPOFOL 50 MG: 10 INJECTION, EMULSION INTRAVENOUS at 08:34

## 2022-12-16 RX ADMIN — Medication 80 MCG: at 09:05

## 2022-12-16 RX ADMIN — MIDAZOLAM HYDROCHLORIDE 2 MG: 1 INJECTION, SOLUTION INTRAMUSCULAR; INTRAVENOUS at 08:14

## 2022-12-16 RX ADMIN — PROPOFOL 25 MG: 10 INJECTION, EMULSION INTRAVENOUS at 08:20

## 2022-12-16 RX ADMIN — WATER 2 G: 1 INJECTION INTRAMUSCULAR; INTRAVENOUS; SUBCUTANEOUS at 08:20

## 2022-12-16 RX ADMIN — PROPOFOL 25 MG: 10 INJECTION, EMULSION INTRAVENOUS at 08:18

## 2022-12-16 RX ADMIN — ONDANSETRON HYDROCHLORIDE 4 MG: 2 INJECTION, SOLUTION INTRAMUSCULAR; INTRAVENOUS at 08:24

## 2022-12-16 RX ADMIN — PROPOFOL 25 MCG/KG/MIN: 10 INJECTION, EMULSION INTRAVENOUS at 08:20

## 2022-12-16 RX ADMIN — ACETAMINOPHEN 650 MG: 325 TABLET ORAL at 11:27

## 2022-12-16 RX ADMIN — MIDAZOLAM HYDROCHLORIDE 3 MG: 1 INJECTION, SOLUTION INTRAMUSCULAR; INTRAVENOUS at 08:00

## 2022-12-16 RX ADMIN — SODIUM CHLORIDE, POTASSIUM CHLORIDE, SODIUM LACTATE AND CALCIUM CHLORIDE: 600; 310; 30; 20 INJECTION, SOLUTION INTRAVENOUS at 07:45

## 2022-12-16 NOTE — BRIEF OP NOTE
Brief Postoperative Note    Patient: Mariah Paredes  YOB: 1955  MRN: [id number]    Date of Procedure: 12/16/2022     Pre-Op Diagnosis:   HALLUX LIMITUS RIGHT FOOT  BONE SPUR RIGHT FIRST METATARSAL PHALANGEAL JOINT    Post-Op Diagnosis:   HALLUX LIMITUS RIGHT FOOT  BONE SPUR RIGHT FIRST METATARSAL PHALANGEAL JOINT    Procedure(s):  ARTHRODESIS RIGHT FIRST METATARSAL PHALANGEAL JOINT     Surgeon(s):  Suly Mei DPM    Surgical Assistant: None    Anesthesia: MAC     Estimated Blood Loss (mL):   < 1mL    Complications: None    Specimens: * No specimens in log *     Implants:   Implant Name Type Inv. Item Serial No.  Lot No. LRB No. Used Action   MAXFORCE MTP PLATE PETITE 0 DEGREE -0 DEGREE RIGHT - SNA  MAXFORCE MTP PLATE PETITE 0 DEGREE -0 DEGREE RIGHT NA [employer] NA Right 1 Implanted   LP SCREW 3.0X16MM CORTICAL HYBRID MTP TI - SNA  LP SCREW 3.0X16MM CORTICAL HYBRID MTP TI NA [employer] NA Right 3 Implanted   LP SCREW 3.0X18MM CORTICAL HYBRID MTP TI - SNA  LP SCREW 3.0X18MM CORTICAL HYBRID MTP TI NA [employer] NA Right 3 Implanted   SCREW BNE L34MM DIA3MM TI LINDA PARTIALLY THRD LO PROF - SNA  SCREW BNE L34MM DIA3MM TI LINDA PARTIALLY THRD LO PROF NA [employer] NA Right 1 Implanted       Drains: * No LDAs found *    Findings: Severe spurring with loose body dorsal aspect of the right 1st MPJ joint. Good bone stock. Cartilage is denuded of 50% of metatarsal head and 25% proximal phalanx base.  Positive collection of synovitis     Electronically Signed by Margarita Estevez DPM on 12/16/2022 at 9:51 AM

## 2022-12-16 NOTE — PROGRESS NOTES
PHYSICAL THERAPY EVALUATION & DISCHARGE  (AMBULATORY SURGERY, EMERGENCY ROOM & RECOVERY ROOM PATIENTS)  Patient: Tj Levy (24 y.o. female)  Date: 12/16/2022  Primary Diagnosis: HALLUX LIMITUS RIGHT FOOT, BONE SPUR RIGHT FIRST METATARSAL PHALANGEAL JOINT  Procedure(s) (LRB):  ARTHRODESIS RIGHT FIRST METATARSAL PHALANGEAL JOINT WITH FUSION (Right) Day of Surgery   Ordered Weight Bearing Status:  right non-weight bearing  Ordered Equipment: crutches    ASSESSMENT :   Based on the objective data described below, patient presents with Contact guard assistance and Minimum assistance level overall for transfers. Gait training completed at Minimum assistance, 5 feet x 2 and using crutches. Patient has knee scooter at home which she plans to use for mobility primarily, only using crutches for ambulating short distances surface to surface. Patient educated on use of crutches utilizing 3 point gait pattern as patient NWB R LE. Patient required min A x 1 for ambulation 2/2 increased trunk sway, which  is able to provide at home. Patient has 4STE, and initially an attempt was made for utilizing bilateral rails for hopping, however patient not able to hop high enough to clear step. For safety and to reduce risk for falls or accidental weightbearing on R, decision made for patient to complete steps scooting on bottom, which she completed with SBA. Patient required min A for standing up from sitting position on steps. Patient is cleared for home discharge with up to min A x 1 from  for transfers and short distance ambulation with crutches. Discharge recommendations: home with family assistance, use knee scooter for mobility primarily 2/2 increased fall risk with crutches      PLAN :  Skilled intervention and education completed. Discharging further skilled acute physical therapy at this time. SUBJECTIVE:   Patient stated I feel pretty good.     OBJECTIVE DATA SUMMARY:   HISTORY:    Past Medical History:   Diagnosis Date    Calculus of kidney     Diabetes ([facility] [address]     Headache     Hypertension     Nausea & vomiting      Past Surgical History:   Procedure Laterality Date    HX BREAST BIOPSY Right     neg    HX COLONOSCOPY      HX CYST INCISION AND DRAINAGE Bilateral     HX CYST REMOVAL  1980    HX GYN  2003    hysterectomy    HX GYN      C SECTION    HX LAP CHOLECYSTECTOMY  05/20/2021    by Dr. Rosalinda Liu at [facility]     [address]     Prior Level of Function/Home Situation: independent   Personal factors and/or comorbidities impacting plan of care: NWB R LE     Home Situation  Home Environment: Private residence  # Steps to Enter: 4  Rails to Enter: Yes  Hand Rails : Bilateral  One/Two Story Residence: Two story, live on 1st floor  Living Alone: No  Support Systems: Spouse/Significant Other  Patient Expects to be Discharged to[de-identified] Home with family assistance  Current DME Used/Available at Home: Crutches, Walker, rollator, Other (comment) (knee scooter)  Tub or Shower Type: Tub/Shower combination    EXAMINATION/PRESENTATION/DECISION MAKING:   Critical Behavior:  Neurologic State: Alert  Orientation Level: Oriented X4  Cognition: Appropriate decision making  Safety/Judgement: Fall prevention, Home safety  Hearing:       Strength and Range Of Motion limitations:  Strength generally WNL, R ankle not tested 2/2 NWB RLE     Sensation:   intact    Transfers:  Overall level of assistance required following instruction: minimal assistance/contact guard assist given verbal using axillary crutches.     Ambulation/gait training:  Weight bearing status during ambulation: Non-weight bearing     Distance (ft): 5 Feet (ft) (5 feet x 2)  Assistive Device: Gait belt, Crutches  Ambulation - Level of Assistance: Minimal assistance, Assist x1       Stair Management:  Number of Stairs Trained: 4  Stairs - Level of Assistance: Stand-by assistance  Rail Use: Left  (scooted up/down steps on bottom)    Functional Measure:  Tinetti test:    Sitting Balance: 1  Arises: 1  Attempts to Rise: 1  Immediate Standing Balance: 0  Standing Balance: 1  Nudged: 1  Eyes Closed: 1  Turn 360 Degrees - Continuous/Discontinuous: 1  Turn 360 Degrees - Steady/Unsteady: 0  Sitting Down: 1  Balance Score: 8 Balance total score  Indication of Gait: 1  R Step Length/Height: 0  L Step Length/Height: 1  R Foot Clearance: 1  L Foot Clearance: 1  Step Symmetry: 0  Step Continuity: 0  Path: 0  Trunk: 0  Walking Time: 0  Gait Score: 4 Gait total score  Total Score: 12/28 Overall total score         Tinetti Tool Score Risk of Falls  <19 = High Fall Risk  19-24 = Moderate Fall Risk  25-28 = Low Fall Risk  Tinetti ME. Performance-Oriented Assessment of Mobility Problems in Elderly Patients. [place] [address]; [id number]. (Scoring Description: PT Bulletin Feb. 10, 1993)    Older adults: Charles Faustin et al, 2009; n = 1000 [facility] elderly evaluated with ABC, JOSEPH, ADL, and IADL)  · Mean JOSEPH score for males aged 69-68 years = 26.21(3.40)  · Mean JOSEPH score for females age 69-68 years = 25.16(4.30)  · Mean JOSEPH score for males over 80 years = 23.29(6.02)  · Mean JOSEPH score for females over 80 years = 17.20(8.32)            Physical Therapy Evaluation Charge Determination   History Examination Presentation Decision-Making   MEDIUM  Complexity : 1-2 comorbidities / personal factors will impact the outcome/ POC  LOW Complexity : 1-2 Standardized tests and measures addressing body structure, function, activity limitation and / or participation in recreation  LOW Complexity : Stable, uncomplicated  Other outcome measures tinetti  HIGH       Based on the above components, the patient evaluation is determined to be of the following complexity level: LOW     Pain:  Pre treatment: 0 /10   During treatment: 1/10  Post treatment:  1/10   Location: R foot    Description:throbbing   Aggravating factors:      Activity Tolerance:   Good, SpO2 stable on RA, and requires rest breaks      After treatment patient left:   Sitting in wheelchair, RN and  at bedside      COMMUNICATION/EDUCATION:   Role of physical therapy explained to the patient. The patients plan of care was discussed with: Registered Nurse.      Topics addressed: Comments:   [x]                                    Device use and technique Crutches adjusted proper height    [x]                                    Transfer technique Kicking R LE out to prevent weightbearing and utilizing crutches for support    [x]                                    Gait training 3 point gait pattern with bilateral crutches, cues for widened crutch placement and shorter hops to improve stability    [x]                                    Stair training Scooting on bottom to reduce risk for falls or accidental R weightbearing due to observed increased fall risk with use of crutches/ inability to hop high enough to clear steps      Thank you for this referral.    Boris Barnett, PT   Time Calculation: 18 mins

## 2022-12-16 NOTE — PROGRESS NOTES
12/16/22 0843   Family Communication   Family Update Message Procedure started   Delivery Origin Nurse    Relationship to Patient Spouse    Phone Number Ramyachely  [phone]   Family/Significant Other Update Called;Updated

## 2022-12-16 NOTE — OP NOTES
[address]   OPERATIVE REPORT    Name:  Herminio An  MR#:  [id number]  :  1955  ACCOUNT #:  [de-identified]  DATE OF SERVICE:  2022    PREOPERATIVE DIAGNOSES:  1. Hallux limitus of the right first metatarsophalangeal joint . 2.  Bone spur of the right first metatarsophalangeal joint. POSTOPERATIVE DIAGNOSES:  1. Hallux limitus of the right first metatarsophalangeal joint . 2.  Bone spur of the right first metatarsophalangeal joint. PROCEDURE PERFORMED:  Arthrodesis of the right first metatarsophalangeal joint. SURGEON:  Shelley Bronson DPM    ASSISTANT:  None. ANESTHESIA:  MAC with local.  The local that was used were 20 mL mixture of 1% lidocaine plain and 0.5% Marcaine plain. COMPLICATIONS:  None. SPECIMENS REMOVED:  Pathology is none. MATERIALS/IMPLANTS:   1.  2-0 and 3-0 Vicryl. 2.  4-0 Monocryl. 3.  One MaxForce 0-degree right plate into which were placed six 3.0 screws 16 mm x3 distally and 18 mm x3 proximally  4. One 3.0 x 34 mm cannulated headed Arthrex. ESTIMATED BLOOD LOSS:  Less than 1 mL. HEMOSTASIS:  A well-padded pneumatic ankle tourniquet elevated to 250 mmHg for a total time of 60 minutes. INJECTABLES:  None. PROCEDURE:  The patient was brought back to the main OR at [facility], correctly identified, right side also correctly identified. She was placed on operating room table in supine position. She underwent MAC anesthesia with zero complications and vital signs stable, after which a well-padded pneumatic ankle tourniquet was placed around the right ankle but not elevated at this time. The above-mentioned local anesthetic was used to block the right foot in a Guerrero block formation. The right foot was then prepped and draped in a normal sterile manner. It was then elevated, exsanguinated and the pneumatic ankle tourniquet was elevated at 250 mmHg. Attention was drawn to the right first MPJ.   An incision approximately 6-7 cm in length was carried down over the first MP joint. This was dorsomedially and medial to the extensor hallucis longus tendon. This incision was carried down very carefully through anatomical layers. Hemostasis maintained with Bovie tip and anatomic dissection. There was a large amount of thickened capsule dorsally that was trimmed as necessary, but leaving enough capsule intact to repair at the end of the case. Also, a significant collection of synovitis was noted dorsomedially which was removed and also one loose body of bone was also removed at that time. Loose body was to the dorso lateral aspect of the joint. Once completed, there was a large bony spurring at the dorsal aspect of the first metatarsal with very little spurring at the base of the proximal phalanx. On evaluation of cartilage, about 50% of the cartilage especially dorsally and medially were eroded. Along the base of the proximal phalanx about 75% was intact of the cartilage and the dorsal 25% was eroded as well. At this time, the cup and cone reamers were not available due to a sterilization issues. Therefore, the joint was prepped with curettage methods. It was fenestrated as well with a K-wire and fish scaling was also performed with osteotome. The area was then flushed with copious amounts of saline. There was good bone-to-bone apposition. I reduced the joint in normal technique and then placed one guidewire for a 3.0 cannulated headed screw being certain there was good bone-to-bone apposition and that the plate was appropriate in alignment via C-arm. Pleased with the alignment, the plate was then applied in normal technique, three screws distally 3.0 in nature, each of them 16 mm in length. Then, the pin was pulled back. The compression was performed of the plate. The plate secured then in appropriate technique.   The guide pin was then placed after compression performed and the screws were then placed in proximal in normal technique with each being 18 mm in length. None of these were locking. Good bone-to-bone apposition of the joint, checked via C-arm. Pleased with the alignment, the 3.0 cannulated screw was then placed from distal medial to proximal lateral.  There was good bone apposition as well during this and good compression. The area was then flushed with copious amounts of saline. I checked once again with an x-ray. X-ray seems to be appropriate. Therefore, the area was closed with 2-0 Vicryl deep, 3-0 Vicryl subcutaneous and 4-0 Monocryl in running interlocking stitch. The pneumatic ankle tourniquet was deflated. Total time of 60 minutes and vascular restored to all toes on the right foot. The area was then dressed with Adaptic, 4x4s, Kerlix and a below-knee fiberglass splint. The heel and bony prominences were well off-loaded. The patient was then awoken from [facility] anesthesia with zero complications. Vital signs stable. She was escorted to the PACU by myself along with anesthesiologist, at which time vitals remained stable and vascular status fully intact to all toes on the right foot. The patient will be nonweightbearing on the right foot, Vicodin for pain, ice and elevate her right foot. Keep the bandage clean, dry and intact. Follow up in the office in 1 week and call at any time with problems, questions or concerns.       SERINA Hunter/[id number]  D:  12/16/2022 10:01  T:  12/16/2022 11:46  JOB #:  [id number]  CC:  Eliseo Shukla DPM

## 2022-12-16 NOTE — ANESTHESIA PREPROCEDURE EVALUATION
Relevant Problems   ENDOCRINE   (+) Severe obesity (HCC)   (+) Type II diabetes mellitus ([facility] [address]       Anesthetic History     PONV          Review of Systems / Medical History  Patient summary reviewed, nursing notes reviewed and pertinent labs reviewed    Pulmonary  Within defined limits                 Neuro/Psych   Within defined limits           Cardiovascular    Hypertension              Exercise tolerance: >4 METS     GI/Hepatic/Renal  Within defined limits             Comments: nita Endo/Other    Diabetes: type 2    Obesity     Other Findings            Physical Exam    Airway  Mallampati: II  TM Distance: > 6 cm  Neck ROM: normal range of motion   Mouth opening: Normal     Cardiovascular  Regular rate and rhythm,  S1 and S2 normal,  no murmur, click, rub, or gallop             Dental  No notable dental hx       Pulmonary  Breath sounds clear to auscultation               Abdominal  GI exam deferred       Other Findings            Anesthetic Plan    ASA: 3  Anesthesia type: MAC and general - backup          Induction: Intravenous  Anesthetic plan and risks discussed with: Patient

## 2022-12-16 NOTE — PERIOP NOTES
I have reviewed discharge instructions in person with the patient and . The patient and  verbalized understanding. Medications, signs, symptoms, and side effects reviewed. Opportunity for questions and clarification allowed. Patient discharging home via private vehicle. Hard-copy of discharge instructions given to patient. Copy of discharge instructions signed and placed on chart. Crutches provided at discharge.

## 2022-12-16 NOTE — ANESTHESIA POSTPROCEDURE EVALUATION
Post-Anesthesia Evaluation and Assessment    Patient: Andrés Díaz MRN: [id number]  SSN: [id number]    YOB: 1955  Age: 79 y.o. Sex: female      I have evaluated the patient and they are stable and ready for discharge from the PACU. Cardiovascular Function/Vital Signs  Visit Vitals  /62   Pulse 63   Temp 36.1 °C (97 °F)   Resp 15   Ht 5' 1\" (1.549 m)   Wt 77.1 kg (170 lb)   SpO2 96%   BMI 32.12 kg/m²       Patient is status post MAC anesthesia for Procedure(s):  ARTHRODESIS RIGHT FIRST METATARSAL PHALANGEAL JOINT WITH FUSION. Nausea/Vomiting: None    Postoperative hydration reviewed and adequate. Pain:  Pain Scale 1: FLACC (12/16/22 0952)  Pain Intensity 1: 0 (12/16/22 0952)   Managed    Neurological Status:   Neuro (WDL): Exceptions to WDL (12/16/22 0952)  Neuro  Neurologic State: Drowsy;Sleeping (12/16/22 0952)  LUE Motor Response: Purposeful (12/16/22 0952)  LLE Motor Response: Purposeful (12/16/22 0952)  RUE Motor Response: Purposeful (12/16/22 0952)  RLE Motor Response: Purposeful;Numbness (12/16/22 0952)   At baseline    Mental Status, Level of Consciousness: Alert and  oriented to person, place, and time    Pulmonary Status:   O2 Device: None (Room air) (12/16/22 1025)   Adequate oxygenation and airway patent    Complications related to anesthesia: None    Post-anesthesia assessment completed. No concerns      Signed By: Josselin Diego DO     December 16, 2022                Procedure(s):  ARTHRODESIS RIGHT FIRST METATARSAL PHALANGEAL JOINT WITH FUSION. MAC, general - backup    <BSHSIANPOST>    INITIAL Post-op Vital signs:   Vitals Value Taken Time   /63 12/16/22 1030   Temp 36.1 °C (97 °F) 12/16/22 0952   Pulse 67 12/16/22 1038   Resp 15 12/16/22 1038   SpO2 86 % 12/16/22 1038   Vitals shown include unvalidated device data.

## 2022-12-21 ENCOUNTER — OFFICE VISIT (OUTPATIENT)
Dept: FAMILY MEDICINE CLINIC | Age: 67
End: 2022-12-21
Payer: MEDICARE

## 2022-12-21 VITALS
OXYGEN SATURATION: 98 % | SYSTOLIC BLOOD PRESSURE: 124 MMHG | RESPIRATION RATE: 16 BRPM | TEMPERATURE: 98 F | BODY MASS INDEX: 32.12 KG/M2 | HEIGHT: 61 IN | DIASTOLIC BLOOD PRESSURE: 67 MMHG | HEART RATE: 82 BPM

## 2022-12-21 DIAGNOSIS — E55.9 VITAMIN D DEFICIENCY: ICD-10-CM

## 2022-12-21 DIAGNOSIS — I10 PRIMARY HYPERTENSION: Primary | ICD-10-CM

## 2022-12-21 DIAGNOSIS — M85.80 OSTEOPENIA, UNSPECIFIED LOCATION: ICD-10-CM

## 2022-12-21 DIAGNOSIS — E11.3313 MODERATE NONPROLIFERATIVE DIABETIC RETINOPATHY OF BOTH EYES WITH MACULAR EDEMA ASSOCIATED WITH TYPE 2 DIABETES MELLITUS (HCC): ICD-10-CM

## 2022-12-21 DIAGNOSIS — E78.49 OTHER HYPERLIPIDEMIA: ICD-10-CM

## 2022-12-21 DIAGNOSIS — E11.9 TYPE 2 DIABETES MELLITUS WITHOUT COMPLICATION, WITHOUT LONG-TERM CURRENT USE OF INSULIN (HCC): ICD-10-CM

## 2022-12-21 DIAGNOSIS — I10 ESSENTIAL HYPERTENSION: ICD-10-CM

## 2022-12-21 DIAGNOSIS — H26.9 CATARACT, UNSPECIFIED CATARACT TYPE, UNSPECIFIED LATERALITY: ICD-10-CM

## 2022-12-21 PROCEDURE — G0463 HOSPITAL OUTPT CLINIC VISIT: HCPCS | Performed by: FAMILY MEDICINE

## 2022-12-21 NOTE — PROGRESS NOTES
Chief Complaint   Patient presents with    Diabetes     Follow up    Hypertension     Follow up         1. \"Have you been to the ER, urgent care clinic since your last visit? Hospitalized since your last visit? \" Yes When: 12/16/22 Where: [facility] Reason for visit: ARTHRODESIS RIGHT FIRST METATARSAL PHALANGEAL JOINT WITH FUSION    2. \"Have you seen or consulted any other health care providers outside of the [address] [place] [address] since your last visit? \" No     3. For patients aged 39-70: Has the patient had a colonoscopy / FIT/ Cologuard? Yes - no Care Gap present      If the patient is female:    4. For patients aged 41-77: Has the patient had a mammogram within the past 2 years? Yes - no Care Gap present      5. For patients aged 21-65: Has the patient had a pap smear?  NA - based on age or sex         3 most recent PHQ Screens 12/21/2022   Little interest or pleasure in doing things Not at all   Feeling down, depressed, irritable, or hopeless Not at all   Total Score PHQ 2 0   Trouble falling or staying asleep, or sleeping too much Not at all   Feeling tired or having little energy Not at all   Poor appetite, weight loss, or overeating Not at all   Feeling bad about yourself - or that you are a failure or have let yourself or your family down Not at all   Trouble concentrating on things such as school, work, reading, or watching TV Not at all   Moving or speaking so slowly that other people could have noticed; or the opposite being so fidgety that others notice Not at all   Thoughts of being better off dead, or hurting yourself in some way Not at all   PHQ 9 Score 0   How difficult have these problems made it for you to do your work, take care of your home and get along with others Not difficult at all       Health Maintenance Due   Topic Date Due    Foot Exam Q1  07/31/2021    COVID-19 Vaccine (5 - Booster for Hernandez Peter series) 11/17/2022

## 2022-12-21 NOTE — PROGRESS NOTES
Neeraj Melendez  79 y.o. female  1955  Keerthi Barlow  [id number]     [facility]       Encounter Date: 12/21/2022           Established Patient Visit Note: Alessia Rea MD    Reason for Appointment:  Chief Complaint   Patient presents with    Diabetes     Follow up    Hypertension     Follow up         History of Present Illness:  History provided by patient    Neeraj Melendez is a 79 y.o. female who presents to clinic today for:     Hallux limitus of the right first MTP: s/p  Arthrodesis of right 1st MTP on 12/16/22 with Dr. Chau Hodges. She had follow up visit yesterday, and she reports that she is doing well and pain is well controlled. Indigestion: continues PRN pepcid  HTN: she reports good adherence to medication. She is trying to limit salt intake  Obesity: She continues to work on diet  DM2: last A1c was 6.4 on 11/21/22; continues metformin  HLD: tolerating Crestor with no side effects  Osteopenia: she had DEXA on 9/23/21 with Frax 1.6% HF and 10.4% MOF. She is taking calcium and vitamin d  Cataract and Retinal Abnormality: Ophthalmologist: [facility] (Dr. Eli Reyes) is eye doctor, and retinal specialist is Dr. Geovanna Singh   Hx of BCC: Followed by dermatology (Dr. Thomas Tran)               Review of Systems  All other ROS were reviewed and are negative except as discussed in HPI        Allergies: Codeine, Flomax [tamsulosin], Lisinopril, and Tetanus and diphther. tox (pf)    Medications:     Current Outpatient Medications:     acetaminophen (TYLENOL PO), Take  by mouth., Disp: , Rfl:     losartan (COZAAR) 100 mg tablet, Take 1 Tablet by mouth daily. , Disp: 90 Tablet, Rfl: 1    OneTouch Delica Plus Lancet 33 gauge misc, Use to check blood sugars two times daily  Dx E11.9, Disp: 100 Each, Rfl: 1    cholecalciferol, vitamin D3, 50 mcg (2,000 unit) tab, Take  by mouth., Disp: , Rfl:     metFORMIN ER (GLUCOPHAGE XR) 500 mg tablet, Take 3 Tablets by mouth daily (with dinner). , Disp: 270 Tablet, Rfl: 1    rosuvastatin (CRESTOR) 5 mg tablet, Take 1 Tablet by mouth nightly., Disp: 90 Tablet, Rfl: 2    glucose blood VI test strips (blood glucose test) strip, Per insurance preference, test 1x/d dx diabetes E11.9, Disp: 100 Strip, Rfl: 3    famotidine (PEPCID) 20 mg tablet, Take 1 Tablet by mouth daily as needed for Heartburn., Disp: 90 Tablet, Rfl: 1    Blood-Glucose Meter monitoring kit, Use to check fasting and meal time blood sugar daily. Patient may use whichever product is covered by insurance., Disp: 1 Kit, Rfl: 0    lancets misc, Per insurance preference, test 1x/d dx diabetes E11.9, Disp: 100 Each, Rfl: 3    History  Patient Care Team:  Andie Oliveros MD as PCP - General (Family Medicine)  Andie Oliveros MD as PCP - [facility] Provider  Otis Epps MD (Obstetrics & Gynecology)  Aga Ward MD (Colon and Rectal Surgery)  Tory Drake MD as Physician (Dermatology Physician)  Crispin Self NP (General Surgery)    Past Medical History: she has a past medical history of Calculus of kidney, Diabetes ([address] Headache, Hypertension, and Nausea & vomiting. Past Surgical History: she has a past surgical history that includes pr unlisted procedure breast (1999); hx gyn (2003); hx cyst removal (1980); hx lap cholecystectomy (05/20/2021); hx breast biopsy (Right); hx cyst incision and drainage (Bilateral); hx colonoscopy; hx gyn; and hx wisdom teeth extraction. Family Medical History: family history includes Cancer in her father; Hildred Last in her mother; Hypertension in her mother; Migraines in her father; No Known Problems in her sister; Parkinson's Disease in her father; Stroke in her father. Social History: she reports that she has never smoked. She has never used smokeless tobacco. She reports that she does not drink alcohol and does not use drugs.         Objective:   Visit Vitals  /67 (BP Patient Position: Sitting) Pulse 82   Temp 98 °F (36.7 °C) (Temporal)   Resp 16   Ht 5' 1\" (1.549 m)   SpO2 98%   BMI 32.12 kg/m²     Wt Readings from Last 3 Encounters:   12/16/22 170 lb (77.1 kg)   11/21/22 178 lb 3.2 oz (80.8 kg)   08/03/22 180 lb 3.2 oz (81.7 kg)       Physical Exam  Vitals and nursing note reviewed. Constitutional:       General: She is not in acute distress. Appearance: Normal appearance. HENT:      Head: Normocephalic and atraumatic. Nose: Nose normal.      Mouth/Throat:      Mouth: Mucous membranes are moist.   Eyes:      Extraocular Movements: Extraocular movements intact. Conjunctiva/sclera: Conjunctivae normal.      Pupils: Pupils are equal, round, and reactive to light. Cardiovascular:      Rate and Rhythm: Normal rate and regular rhythm. Pulses: Normal pulses. Heart sounds: Normal heart sounds. No murmur heard. No friction rub. No gallop. Pulmonary:      Effort: Pulmonary effort is normal. No respiratory distress. Breath sounds: Normal breath sounds. No wheezing, rhonchi or rales. Musculoskeletal:         General: Normal range of motion. Cervical back: Normal range of motion and neck supple. No rigidity. No muscular tenderness. Lymphadenopathy:      Cervical: No cervical adenopathy. Skin:     General: Skin is warm. Coloration: Skin is not jaundiced. Neurological:      General: No focal deficit present. Mental Status: She is alert. Mental status is at baseline. Motor: Motor function is intact. Coordination: Coordination is intact. Psychiatric:         Mood and Affect: Mood normal.         Behavior: Behavior normal.         Thought Content: Thought content normal.         Judgment: Judgment normal.       Assessment & Plan:      ICD-10-CM ICD-9-CM    1. Primary hypertension  I10 401.9       2. Type 2 diabetes mellitus without complication, without long-term current use of insulin (HCC)  E11.9 250.00       3.  Other hyperlipidemia  E78.49 272.4       4. Vitamin D deficiency  E55.9 268.9       5. Essential hypertension  I10 401.9       6. Moderate nonproliferative diabetic retinopathy of both eyes with macular edema associated with type 2 diabetes mellitus ([facility]  E11.3313 250.50      362.07      362.05       7. Cataract, unspecified cataract type, unspecified laterality  H26.9 366.9       8. Osteopenia, unspecified location  M85.80 733.90         Weight Loss: patient reports that this I intnetional. She has been  trying to eat healther. She denies any night sweats, fevers, or other concerning symptoms. All other conditions listed above: Chronic, stable and/or managed by specialist. Will continue current treatment regimen. Discussed following up with specialist as scheduled. Discussed recommendations for diet and exercise. I have discussed the diagnosis with the patient and the intended plan as seen in the above orders. The patient has received an after-visit summary along with patient information handout. I have discussed medication side effects and warnings with the patient as well. Disposition  Follow-up and Dispositions    Return in about 6 weeks (around 2/1/2023).            Aruna Callaway MD

## 2023-02-17 ENCOUNTER — OFFICE VISIT (OUTPATIENT)
Dept: FAMILY MEDICINE CLINIC | Age: 68
End: 2023-02-17
Payer: MEDICARE

## 2023-02-17 VITALS
SYSTOLIC BLOOD PRESSURE: 147 MMHG | OXYGEN SATURATION: 99 % | BODY MASS INDEX: 33.99 KG/M2 | DIASTOLIC BLOOD PRESSURE: 91 MMHG | WEIGHT: 180 LBS | RESPIRATION RATE: 17 BRPM | HEIGHT: 61 IN | TEMPERATURE: 98.1 F | HEART RATE: 80 BPM

## 2023-02-17 DIAGNOSIS — E78.49 OTHER HYPERLIPIDEMIA: ICD-10-CM

## 2023-02-17 DIAGNOSIS — E55.9 VITAMIN D DEFICIENCY: ICD-10-CM

## 2023-02-17 DIAGNOSIS — I10 ESSENTIAL HYPERTENSION: ICD-10-CM

## 2023-02-17 DIAGNOSIS — E11.9 TYPE 2 DIABETES MELLITUS WITHOUT COMPLICATION, WITHOUT LONG-TERM CURRENT USE OF INSULIN (HCC): ICD-10-CM

## 2023-02-17 DIAGNOSIS — I10 PRIMARY HYPERTENSION: Primary | ICD-10-CM

## 2023-02-17 DIAGNOSIS — E11.3313 MODERATE NONPROLIFERATIVE DIABETIC RETINOPATHY OF BOTH EYES WITH MACULAR EDEMA ASSOCIATED WITH TYPE 2 DIABETES MELLITUS (HCC): ICD-10-CM

## 2023-02-17 PROCEDURE — 3017F COLORECTAL CA SCREEN DOC REV: CPT | Performed by: FAMILY MEDICINE

## 2023-02-17 PROCEDURE — 99214 OFFICE O/P EST MOD 30 MIN: CPT | Performed by: FAMILY MEDICINE

## 2023-02-17 PROCEDURE — 3044F HG A1C LEVEL LT 7.0%: CPT | Performed by: FAMILY MEDICINE

## 2023-02-17 PROCEDURE — G8417 CALC BMI ABV UP PARAM F/U: HCPCS | Performed by: FAMILY MEDICINE

## 2023-02-17 PROCEDURE — G8536 NO DOC ELDER MAL SCRN: HCPCS | Performed by: FAMILY MEDICINE

## 2023-02-17 PROCEDURE — G0463 HOSPITAL OUTPT CLINIC VISIT: HCPCS | Performed by: FAMILY MEDICINE

## 2023-02-17 PROCEDURE — G8399 PT W/DXA RESULTS DOCUMENT: HCPCS | Performed by: FAMILY MEDICINE

## 2023-02-17 PROCEDURE — 1101F PT FALLS ASSESS-DOCD LE1/YR: CPT | Performed by: FAMILY MEDICINE

## 2023-02-17 PROCEDURE — G8432 DEP SCR NOT DOC, RNG: HCPCS | Performed by: FAMILY MEDICINE

## 2023-02-17 PROCEDURE — G9899 SCRN MAM PERF RSLTS DOC: HCPCS | Performed by: FAMILY MEDICINE

## 2023-02-17 PROCEDURE — 3074F SYST BP LT 130 MM HG: CPT | Performed by: FAMILY MEDICINE

## 2023-02-17 PROCEDURE — 1090F PRES/ABSN URINE INCON ASSESS: CPT | Performed by: FAMILY MEDICINE

## 2023-02-17 PROCEDURE — 2022F DILAT RTA XM EVC RTNOPTHY: CPT | Performed by: FAMILY MEDICINE

## 2023-02-17 PROCEDURE — 3078F DIAST BP <80 MM HG: CPT | Performed by: FAMILY MEDICINE

## 2023-02-17 PROCEDURE — G8427 DOCREV CUR MEDS BY ELIG CLIN: HCPCS | Performed by: FAMILY MEDICINE

## 2023-02-17 PROCEDURE — 1123F ACP DISCUSS/DSCN MKR DOCD: CPT | Performed by: FAMILY MEDICINE

## 2023-02-17 RX ORDER — HYDROCHLOROTHIAZIDE 12.5 MG/1
12.5 TABLET ORAL DAILY
Qty: 90 TABLET | Refills: 1 | Status: SHIPPED | OUTPATIENT
Start: 2023-02-17

## 2023-02-17 NOTE — PROGRESS NOTES
Chief Complaint   Patient presents with    Diabetes         1. \"Have you been to the ER, urgent care clinic since your last visit? Hospitalized since your last visit? \" No    2. \"Have you seen or consulted any other health care providers outside of the [address] [place] [address] since your last visit? \" No     3. For patients aged 39-70: Has the patient had a colonoscopy / FIT/ Cologuard? Yes - no Care Gap present      If the patient is female:    4. For patients aged 41-77: Has the patient had a mammogram within the past 2 years? Yes - no Care Gap present      5. For patients aged 21-65: Has the patient had a pap smear?  NA - based on age or sex         3 most recent PHQ Screens 12/21/2022   Little interest or pleasure in doing things Not at all   Feeling down, depressed, irritable, or hopeless Not at all   Total Score PHQ 2 0   Trouble falling or staying asleep, or sleeping too much Not at all   Feeling tired or having little energy Not at all   Poor appetite, weight loss, or overeating Not at all   Feeling bad about yourself - or that you are a failure or have let yourself or your family down Not at all   Trouble concentrating on things such as school, work, reading, or watching TV Not at all   Moving or speaking so slowly that other people could have noticed; or the opposite being so fidgety that others notice Not at all   Thoughts of being better off dead, or hurting yourself in some way Not at all   PHQ 9 Score 0   How difficult have these problems made it for you to do your work, take care of your home and get along with others Not difficult at all       Health Maintenance Due   Topic Date Due    Foot Exam Q1  07/31/2021    COVID-19 Vaccine (5 - Booster for Hernandez Peter series) 11/17/2022

## 2023-02-17 NOTE — PROGRESS NOTES
Elvis Stevens  79 y.o. female  1955  Keerthi Barlow  [id number]     [facility]       Encounter Date: 2/17/2023           Established Patient Visit Note: Guilherme Valladares MD    Reason for Appointment:  Chief Complaint   Patient presents with    Diabetes    Other     Patient states that she is feeling a lot better from foot surgery and moving around more with successfully physical therapy. History of Present Illness:  History provided by {Relatives - adult:[id number]}    lEvis Stevens is a 79 y.o. female who presents to clinic today for:     Hallux limitus of the right first MTP: s/p  Arthrodesis of right 1st MTP on 12/16/22 with Dr. Viktoria Leone. She reports that she is doing well and pain is well controlled. She has follow up visit with podiatry next week. Indigestion: continues PRN pepcid  HTN: she reports good adherence to medication. She is trying to limit salt intake. Obesity: She continues to work on diet and now exercise given that her foot pain has improved  DM2: last A1c was 6.4 on 11/21/22; continues metformin  HLD: tolerating Crestor with no side effects  Osteopenia: she had DEXA on 9/23/21 with Frax 1.6% HF and 10.4% MOF. She is taking calcium and vitamin d  Cataract and Retinal Abnormality: Ophthalmologist: [facility] (Dr. Amadeo Johns) is eye doctor, and retinal specialist is Dr. Stefany Pierson   Hx of BCC: Followed by dermatology (Dr. Jayro Brush)                Review of Systems  All other ROS were reviewed and are negative except as discussed in HPI         Allergies: Codeine, Flomax [tamsulosin], Lisinopril, and Tetanus and diphther. tox (pf)    Medications:     Current Outpatient Medications:     acetaminophen (TYLENOL PO), Take  by mouth., Disp: , Rfl:     losartan (COZAAR) 100 mg tablet, Take 1 Tablet by mouth daily. , Disp: 90 Tablet, Rfl: 1    OneTouch Delica Plus Lancet 33 gauge misc, Use to check blood sugars two times daily  Dx E11.9, Disp: 100 Each, Rfl: 1    cholecalciferol, vitamin D3, 50 mcg (2,000 unit) tab, Take  by mouth., Disp: , Rfl:     metFORMIN ER (GLUCOPHAGE XR) 500 mg tablet, Take 3 Tablets by mouth daily (with dinner). , Disp: 270 Tablet, Rfl: 1    rosuvastatin (CRESTOR) 5 mg tablet, Take 1 Tablet by mouth nightly., Disp: 90 Tablet, Rfl: 2    glucose blood VI test strips (blood glucose test) strip, Per insurance preference, test 1x/d dx diabetes E11.9, Disp: 100 Strip, Rfl: 3    famotidine (PEPCID) 20 mg tablet, Take 1 Tablet by mouth daily as needed for Heartburn., Disp: 90 Tablet, Rfl: 1    Blood-Glucose Meter monitoring kit, Use to check fasting and meal time blood sugar daily. Patient may use whichever product is covered by insurance., Disp: 1 Kit, Rfl: 0    History  Patient Care Team:  Brittney Waite MD as PCP - General (Family Medicine)  Brittney Waite MD as PCP - [facility]  Anusha Rodriguez MD (Obstetrics & Gynecology)  Christin Hagen MD (Colon and Rectal Surgery)  Brina Machuca MD as Physician (Dermatology Physician)  Kelly Meek NP (General Surgery)    Past Medical History: she has a past medical history of Calculus of kidney, Diabetes ([address] Headache, Hypertension, and Nausea & vomiting. Past Surgical History: she has a past surgical history that includes pr unlisted procedure breast (1999); hx gyn (2003); hx cyst removal (1980); hx lap cholecystectomy (05/20/2021); hx breast biopsy (Right); hx cyst incision and drainage (Bilateral); hx colonoscopy; hx gyn; and hx wisdom teeth extraction. Family Medical History: family history includes Cancer in her father; Js Mimes in her mother; Hypertension in her mother; Migraines in her father; No Known Problems in her sister; Parkinson's Disease in her father; Stroke in her father. Social History: she reports that she has never smoked.  She has never used smokeless tobacco. She reports that she does not drink alcohol and does not use drugs. Objective:   Visit Vitals  BP (!) 147/91 (BP 1 Location: Right upper arm, BP Patient Position: Sitting, BP Cuff Size: Adult)   Pulse 80   Temp 98.1 °F (36.7 °C) (Temporal)   Resp 17   Ht 5' 1\" (1.549 m)   Wt 180 lb (81.6 kg)   SpO2 99%   BMI 34.01 kg/m²     Wt Readings from Last 3 Encounters:   02/17/23 180 lb (81.6 kg)   12/16/22 170 lb (77.1 kg)   11/21/22 178 lb 3.2 oz (80.8 kg)       Physical Exam    Assessment & Plan:    {No Diagnosis Found}    Start HCTZ, RTC 2 weeks for nurse BP check and 3 months for clinic follow up visit. I have discussed the diagnosis with the patient and the intended plan as seen in the above orders. The patient has received an after-visit summary along with patient information handout. I have discussed medication side effects and warnings with the patient as well.     Disposition        Caleb Rivera MD normal.       Assessment & Plan:      ICD-10-CM ICD-9-CM    1. Primary hypertension  I10 401.9 hydroCHLOROthiazide (HYDRODIURIL) 12.5 mg tablet      2. Moderate nonproliferative diabetic retinopathy of both eyes with macular edema associated with type 2 diabetes mellitus ([facility]  E11.3313 250.50      362.07      362.05       3. Type 2 diabetes mellitus without complication, without long-term current use of insulin (HCC)  E11.9 250.00 MICROALBUMIN, UR, RAND W/ MICROALB/CREAT RATIO      HEMOGLOBIN A1C WITH EAG      HEMOGLOBIN A1C WITH EAG      MICROALBUMIN, UR, RAND W/ MICROALB/CREAT RATIO      4. Other hyperlipidemia  E78.49 272.4 LIPID PANEL      METABOLIC PANEL, COMPREHENSIVE      METABOLIC PANEL, COMPREHENSIVE      LIPID PANEL      5. Vitamin D deficiency  E55.9 268.9 VITAMIN D, 25 HYDROXY      VITAMIN D, 25 HYDROXY      6. Essential hypertension  I10 401.9 CBC W/O DIFF      URINALYSIS W/ RFLX MICROSCOPIC      TSH 3RD GENERATION      TSH 3RD GENERATION      URINALYSIS W/ RFLX MICROSCOPIC      CBC W/O DIFF        HTN: Chronic, uncontrolled. Start HCTZ, RTC 2 weeks for nurse BP check and 3 months for clinic follow up visit. Obesity: I have reviewed/discussed the above normal BMI with the patient. I have recommended the following interventions: dietary management education, guidance, and counseling, encourage exercise, monitor weight and prescribed dietary intake. All other conditions listed above: Chronic, stable and/or managed by specialist. Will continue current treatment regimen. Will check labs as reflected above. Discussed following up with specialist as scheduled. Discussed recommendations for diet and exercise. I have discussed the diagnosis with the patient and the intended plan as seen in the above orders. The patient has received an after-visit summary along with patient information handout.   I have discussed medication side effects and warnings with the patient as well.    Disposition  Follow-up and Dispositions    Return in about 3 months (around 5/17/2023).            Amber Burrell MD

## 2023-02-18 LAB
25(OH)D3 SERPL-MCNC: 41.4 NG/ML (ref 30–100)
ALBUMIN SERPL-MCNC: 3.7 G/DL (ref 3.5–5)
ALBUMIN/GLOB SERPL: 1.1 (ref 1.1–2.2)
ALP SERPL-CCNC: 91 U/L (ref 45–117)
ALT SERPL-CCNC: 11 U/L (ref 12–78)
ANION GAP SERPL CALC-SCNC: 5 MMOL/L (ref 5–15)
APPEARANCE UR: CLEAR
AST SERPL-CCNC: 9 U/L (ref 15–37)
BACTERIA URNS QL MICRO: NEGATIVE /HPF
BILIRUB SERPL-MCNC: 0.4 MG/DL (ref 0.2–1)
BILIRUB UR QL: NEGATIVE
BUN SERPL-MCNC: 15 MG/DL (ref 6–20)
BUN/CREAT SERPL: 20 (ref 12–20)
CALCIUM SERPL-MCNC: 9.2 MG/DL (ref 8.5–10.1)
CHLORIDE SERPL-SCNC: 107 MMOL/L (ref 97–108)
CHOLEST SERPL-MCNC: 150 MG/DL
CO2 SERPL-SCNC: 31 MMOL/L (ref 21–32)
COLOR UR: ABNORMAL
CREAT SERPL-MCNC: 0.74 MG/DL (ref 0.55–1.02)
CREAT UR-MCNC: 139 MG/DL
EPITH CASTS URNS QL MICRO: ABNORMAL /LPF
ERYTHROCYTE [DISTWIDTH] IN BLOOD BY AUTOMATED COUNT: 13.3 % (ref 11.5–14.5)
EST. AVERAGE GLUCOSE BLD GHB EST-MCNC: 134 MG/DL
GLOBULIN SER CALC-MCNC: 3.3 G/DL (ref 2–4)
GLUCOSE SERPL-MCNC: 131 MG/DL (ref 65–100)
GLUCOSE UR STRIP.AUTO-MCNC: NEGATIVE MG/DL
HBA1C MFR BLD: 6.3 % (ref 4–5.6)
HCT VFR BLD AUTO: 41.2 % (ref 35–47)
HDLC SERPL-MCNC: 80 MG/DL
HDLC SERPL: 1.9 (ref 0–5)
HGB BLD-MCNC: 12.2 G/DL (ref 11.5–16)
HGB UR QL STRIP: NEGATIVE
HYALINE CASTS URNS QL MICRO: ABNORMAL /LPF (ref 0–5)
KETONES UR QL STRIP.AUTO: NEGATIVE MG/DL
LDLC SERPL CALC-MCNC: 59.2 MG/DL (ref 0–100)
LEUKOCYTE ESTERASE UR QL STRIP.AUTO: ABNORMAL
MCH RBC QN AUTO: 29.3 PG (ref 26–34)
MCHC RBC AUTO-ENTMCNC: 29.6 G/DL (ref 30–36.5)
MCV RBC AUTO: 99 FL (ref 80–99)
MICROALBUMIN UR-MCNC: 2.79 MG/DL
MICROALBUMIN/CREAT UR-RTO: 20 MG/G (ref 0–30)
NITRITE UR QL STRIP.AUTO: NEGATIVE
NRBC # BLD: 0 K/UL (ref 0–0.01)
NRBC BLD-RTO: 0 PER 100 WBC
PH UR STRIP: 5 (ref 5–8)
PLATELET # BLD AUTO: 228 K/UL (ref 150–400)
PMV BLD AUTO: 11 FL (ref 8.9–12.9)
POTASSIUM SERPL-SCNC: 4.3 MMOL/L (ref 3.5–5.1)
PROT SERPL-MCNC: 7 G/DL (ref 6.4–8.2)
PROT UR STRIP-MCNC: NEGATIVE MG/DL
RBC # BLD AUTO: 4.16 M/UL (ref 3.8–5.2)
RBC #/AREA URNS HPF: ABNORMAL /HPF (ref 0–5)
SODIUM SERPL-SCNC: 143 MMOL/L (ref 136–145)
SP GR UR REFRACTOMETRY: 1.02 (ref 1–1.03)
TRIGL SERPL-MCNC: 54 MG/DL (ref ?–150)
TSH SERPL DL<=0.05 MIU/L-ACNC: 1.46 UIU/ML (ref 0.36–3.74)
UROBILINOGEN UR QL STRIP.AUTO: 0.2 EU/DL (ref 0.2–1)
VLDLC SERPL CALC-MCNC: 10.8 MG/DL
WBC # BLD AUTO: 6.9 K/UL (ref 3.6–11)
WBC URNS QL MICRO: ABNORMAL /HPF (ref 0–4)

## 2023-03-02 DIAGNOSIS — E11.9 TYPE 2 DIABETES MELLITUS WITHOUT COMPLICATION, WITHOUT LONG-TERM CURRENT USE OF INSULIN (HCC): ICD-10-CM

## 2023-03-02 RX ORDER — METFORMIN HYDROCHLORIDE 500 MG/1
1500 TABLET, EXTENDED RELEASE ORAL
Qty: 270 TABLET | Refills: 1 | Status: SHIPPED | OUTPATIENT
Start: 2023-03-02

## 2023-03-02 NOTE — TELEPHONE ENCOUNTER
Last Visit: 2/17/23 MD Sharolyn Paget  Next Appointment: 5/18/23 MD Alina Sanchez  Previous Refill Encounter(s): 9/9/22 270 + 1    Requested Prescriptions     Pending Prescriptions Disp Refills    metFORMIN ER (GLUCOPHAGE XR) 500 mg tablet 270 Tablet 1     Sig: Take 3 Tablets by mouth daily (with dinner). For Pharmacy Admin Tracking Only    Program: Medication Refill  CPA in place:   Recommendation Provided To:    Intervention Detail: New Rx: 1, reason: Patient Preference  Intervention Accepted By:   Dwight Estevez Closed?:   Time Spent (min): 5

## 2023-03-09 DIAGNOSIS — E11.9 TYPE 2 DIABETES MELLITUS WITHOUT COMPLICATION, WITHOUT LONG-TERM CURRENT USE OF INSULIN (HCC): ICD-10-CM

## 2023-03-09 RX ORDER — ROSUVASTATIN CALCIUM 5 MG/1
5 TABLET, COATED ORAL
Qty: 90 TABLET | Refills: 3 | Status: SHIPPED | OUTPATIENT
Start: 2023-03-09

## 2023-03-09 NOTE — TELEPHONE ENCOUNTER
Last visit 02/17/2023 MD Sybil Matos   Next appointment 05/18/2023 MD Rocio Villafana   Previous refill encounter(s)   06/20/2022 Crestor #90 with 2 refills. For Pharmacy Admin Tracking Only    Program: Medication Refill  Intervention Detail: New Rx: 1, reason: Patient Preference  Time Spent (min): 5      Requested Prescriptions     Pending Prescriptions Disp Refills    rosuvastatin (CRESTOR) 5 mg tablet 90 Tablet 0     Sig: Take 1 Tablet by mouth nightly.

## 2023-03-20 DIAGNOSIS — E11.9 TYPE 2 DIABETES MELLITUS WITHOUT COMPLICATION, WITHOUT LONG-TERM CURRENT USE OF INSULIN (HCC): ICD-10-CM

## 2023-03-21 RX ORDER — IBUPROFEN 200 MG
CAPSULE ORAL
Qty: 100 STRIP | Refills: 3 | Status: SHIPPED | OUTPATIENT
Start: 2023-03-21

## 2023-03-21 NOTE — TELEPHONE ENCOUNTER
Tan request for refill test strips. Thanks, Morales Dunn    Last Visit: 2/17/23 Zora Louie  Next Appointment: 5/18/23 Divya Bernal  Previous Refill Encounter(s): 1/27/22 100 + 3    Requested Prescriptions     Pending Prescriptions Disp Refills    glucose blood VI test strips (blood glucose test) strip 100 Strip 3     Sig: Per insurance preference, test 1x/d dx diabetes E11.9     For Pharmacy Admin Tracking Only    Program: Medication Refill  CPA in place:   Recommendation Provided To:    Intervention Detail: New Rx: 1, reason: Patient Preference  Intervention Accepted By:   Chata Pal Closed?:   Time Spent (min): 5

## 2023-05-18 ENCOUNTER — OFFICE VISIT (OUTPATIENT)
Age: 68
End: 2023-05-18
Payer: MEDICARE

## 2023-05-18 VITALS
OXYGEN SATURATION: 98 % | TEMPERATURE: 98.3 F | BODY MASS INDEX: 35.12 KG/M2 | RESPIRATION RATE: 18 BRPM | SYSTOLIC BLOOD PRESSURE: 138 MMHG | WEIGHT: 186 LBS | HEART RATE: 84 BPM | DIASTOLIC BLOOD PRESSURE: 78 MMHG | HEIGHT: 61 IN

## 2023-05-18 DIAGNOSIS — M85.80 OSTEOPENIA, UNSPECIFIED LOCATION: ICD-10-CM

## 2023-05-18 DIAGNOSIS — E78.49 OTHER HYPERLIPIDEMIA: ICD-10-CM

## 2023-05-18 DIAGNOSIS — I10 PRIMARY HYPERTENSION: Primary | ICD-10-CM

## 2023-05-18 DIAGNOSIS — Z85.828 HISTORY OF BASAL CELL CARCINOMA: ICD-10-CM

## 2023-05-18 DIAGNOSIS — K21.9 GASTROESOPHAGEAL REFLUX DISEASE, UNSPECIFIED WHETHER ESOPHAGITIS PRESENT: ICD-10-CM

## 2023-05-18 DIAGNOSIS — E66.01 SEVERE OBESITY (HCC): ICD-10-CM

## 2023-05-18 DIAGNOSIS — E11.3313 MODERATE NONPROLIFERATIVE DIABETIC RETINOPATHY OF BOTH EYES WITH MACULAR EDEMA ASSOCIATED WITH TYPE 2 DIABETES MELLITUS (HCC): ICD-10-CM

## 2023-05-18 PROCEDURE — G8427 DOCREV CUR MEDS BY ELIG CLIN: HCPCS | Performed by: STUDENT IN AN ORGANIZED HEALTH CARE EDUCATION/TRAINING PROGRAM

## 2023-05-18 PROCEDURE — 2022F DILAT RTA XM EVC RTNOPTHY: CPT | Performed by: STUDENT IN AN ORGANIZED HEALTH CARE EDUCATION/TRAINING PROGRAM

## 2023-05-18 PROCEDURE — 3017F COLORECTAL CA SCREEN DOC REV: CPT | Performed by: STUDENT IN AN ORGANIZED HEALTH CARE EDUCATION/TRAINING PROGRAM

## 2023-05-18 PROCEDURE — 1123F ACP DISCUSS/DSCN MKR DOCD: CPT | Performed by: STUDENT IN AN ORGANIZED HEALTH CARE EDUCATION/TRAINING PROGRAM

## 2023-05-18 PROCEDURE — G8417 CALC BMI ABV UP PARAM F/U: HCPCS | Performed by: STUDENT IN AN ORGANIZED HEALTH CARE EDUCATION/TRAINING PROGRAM

## 2023-05-18 PROCEDURE — 3075F SYST BP GE 130 - 139MM HG: CPT | Performed by: STUDENT IN AN ORGANIZED HEALTH CARE EDUCATION/TRAINING PROGRAM

## 2023-05-18 PROCEDURE — 99215 OFFICE O/P EST HI 40 MIN: CPT | Performed by: STUDENT IN AN ORGANIZED HEALTH CARE EDUCATION/TRAINING PROGRAM

## 2023-05-18 PROCEDURE — 1090F PRES/ABSN URINE INCON ASSESS: CPT | Performed by: STUDENT IN AN ORGANIZED HEALTH CARE EDUCATION/TRAINING PROGRAM

## 2023-05-18 PROCEDURE — G8399 PT W/DXA RESULTS DOCUMENT: HCPCS | Performed by: STUDENT IN AN ORGANIZED HEALTH CARE EDUCATION/TRAINING PROGRAM

## 2023-05-18 PROCEDURE — 1036F TOBACCO NON-USER: CPT | Performed by: STUDENT IN AN ORGANIZED HEALTH CARE EDUCATION/TRAINING PROGRAM

## 2023-05-18 PROCEDURE — 3044F HG A1C LEVEL LT 7.0%: CPT | Performed by: STUDENT IN AN ORGANIZED HEALTH CARE EDUCATION/TRAINING PROGRAM

## 2023-05-18 PROCEDURE — 3078F DIAST BP <80 MM HG: CPT | Performed by: STUDENT IN AN ORGANIZED HEALTH CARE EDUCATION/TRAINING PROGRAM

## 2023-05-18 RX ORDER — MULTIVIT-MIN/IRON/FOLIC ACID/K 18-600-40
CAPSULE ORAL
COMMUNITY

## 2023-05-18 RX ORDER — METFORMIN HYDROCHLORIDE 500 MG/1
1500 TABLET, EXTENDED RELEASE ORAL
Qty: 270 TABLET | Refills: 0 | Status: SHIPPED | OUTPATIENT
Start: 2023-05-18

## 2023-05-18 RX ORDER — LOSARTAN POTASSIUM 100 MG/1
100 TABLET ORAL DAILY
Qty: 90 TABLET | Refills: 0 | Status: SHIPPED | OUTPATIENT
Start: 2023-05-18

## 2023-05-18 RX ORDER — HYDROCHLOROTHIAZIDE 12.5 MG/1
12.5 TABLET ORAL DAILY
Qty: 90 TABLET | Refills: 0 | Status: SHIPPED | OUTPATIENT
Start: 2023-05-18

## 2023-05-18 RX ORDER — ROSUVASTATIN CALCIUM 5 MG/1
5 TABLET, COATED ORAL
Qty: 90 TABLET | Refills: 0 | Status: SHIPPED | OUTPATIENT
Start: 2023-05-18

## 2023-05-18 ASSESSMENT — PATIENT HEALTH QUESTIONNAIRE - PHQ9
SUM OF ALL RESPONSES TO PHQ QUESTIONS 1-9: 0
SUM OF ALL RESPONSES TO PHQ QUESTIONS 1-9: 0
1. LITTLE INTEREST OR PLEASURE IN DOING THINGS: 0
2. FEELING DOWN, DEPRESSED OR HOPELESS: 0
SUM OF ALL RESPONSES TO PHQ QUESTIONS 1-9: 0
SUM OF ALL RESPONSES TO PHQ9 QUESTIONS 1 & 2: 0
SUM OF ALL RESPONSES TO PHQ QUESTIONS 1-9: 0

## 2023-05-18 ASSESSMENT — ENCOUNTER SYMPTOMS
COUGH: 0
WHEEZING: 0
PHOTOPHOBIA: 0

## 2023-05-18 NOTE — PROGRESS NOTES
Marcia Silva  76 y.o. female  1955  [address]  [id number]     [facility]       Chief Complaint: followup chronic conditions  Source: self, the medical record     Subjective  Marcia Silva is an 76 y.o. female who presents for followup/nest with new PCP for chronic conditions:    HTN: HCTZ 12.5mg daily, losartan 100mg daily, last CMP normal kidney function  HLD: take crestor 5mg QHS  last LDL 59, HLD 80 2/2023  DMII with retinopathy: metformin 1500mg with dinner, last a1c 6.3%, neg microalb2/17/23. Sees Dr Clarissa Myers (Optho) + Dr Gopi Noalsco (retinal specialist). She is tracking her food and fasting BGs each day. Fasting BGs run 120 -144, majority 120s to 130s. No beverages with sugar, watches carbohydrate intake as well  GERD: pepcid 20mg prn - rarely uses   Obesity: weight has been stable up to 186 from 180 today (over 3 months). Taking 9890-1696 steps daily; tracking meals and BGs, active doing yardwork and errands daily  Osteopenia: DEXA 9/2021, on calcium + Vit D (not currently taking)  Hx BCC: annual skin check with derm, Dr Brenda Morataya      Review of Systems   Constitutional:  Negative for fatigue and unexpected weight change. Eyes:  Negative for photophobia and visual disturbance. Respiratory:  Negative for cough and wheezing. Cardiovascular:  Negative for chest pain and palpitations. Endocrine: Negative for polyuria. Genitourinary:  Negative for dysuria and frequency. Neurological:  Negative for dizziness and headaches. Allergies - reviewed:    Allergies   Allergen Reactions    Codeine Other (See Comments)     Passed out    Lisinopril Diarrhea    Tamsulosin Other (See Comments)     presyncope         Medications - reviewed:   Current Outpatient Medications   Medication Sig    hydroCHLOROthiazide (HYDRODIURIL) 12.5 MG tablet Take 1 tablet by mouth daily    losartan (COZAAR) 100 MG tablet Take 1 tablet by mouth daily

## 2023-05-19 LAB
EST. AVERAGE GLUCOSE BLD GHB EST-MCNC: 143 MG/DL
HBA1C MFR BLD: 6.6 % (ref 4–5.6)
VIT B12 SERPL-MCNC: 244 PG/ML (ref 193–986)

## 2023-05-22 DIAGNOSIS — E11.9 TYPE 2 DIABETES MELLITUS WITHOUT COMPLICATION, WITHOUT LONG-TERM CURRENT USE OF INSULIN (HCC): Primary | ICD-10-CM

## 2023-08-14 DIAGNOSIS — I10 PRIMARY HYPERTENSION: ICD-10-CM

## 2023-08-15 RX ORDER — LOSARTAN POTASSIUM 100 MG/1
TABLET ORAL
Qty: 90 TABLET | Refills: 0 | Status: SHIPPED | OUTPATIENT
Start: 2023-08-15

## 2023-08-15 NOTE — TELEPHONE ENCOUNTER
PCP: Nilay Yao MD    Last appt: [unfilled]  Future Appointments   Date Time Provider [facility] [address]   9/20/2023  1:40 PM Nilay Yao MD [facility]   11/30/2023  8:20 AM Nilay Yao MD [facility]       Requested Prescriptions     Pending Prescriptions Disp Refills    losartan (COZAAR) 100 MG tablet [Pharmacy Med Name: LOSARTAN POTASSIUM 100 MG TAB] 90 tablet 0     Sig: TAKE 1 TABLET BY MOUTH EVERY DAY

## 2023-08-18 DIAGNOSIS — E78.49 OTHER HYPERLIPIDEMIA: ICD-10-CM

## 2023-08-21 RX ORDER — ROSUVASTATIN CALCIUM 5 MG/1
5 TABLET, COATED ORAL
Qty: 90 TABLET | Refills: 1 | Status: SHIPPED | OUTPATIENT
Start: 2023-08-21

## 2023-08-21 NOTE — TELEPHONE ENCOUNTER
PCP: Shanique Wade MD    Last appt: [unfilled]  Future Appointments   Date Time Provider [facility] [address]   9/20/2023  1:40 PM Shanique Wade MD [facility] BS AMB   11/30/2023  8:20 AM Shanique Wade MD [facility] BS AMB       Requested Prescriptions     Pending Prescriptions Disp Refills    rosuvastatin (CRESTOR) 5 MG tablet [Pharmacy Med Name: ROSUVASTATIN CALCIUM 5 MG TAB] 90 tablet 0     Sig: TAKE 1 TABLET BY MOUTH NIGHTLY

## 2023-08-23 ENCOUNTER — HOSPITAL ENCOUNTER (EMERGENCY)
Facility: HOSPITAL | Age: 68
Discharge: HOME | End: 2023-08-23
Attending: EMERGENCY MEDICINE
Payer: MEDICARE

## 2023-08-23 ENCOUNTER — APPOINTMENT (OUTPATIENT)
Facility: HOSPITAL | Age: 68
End: 2023-08-23
Payer: MEDICARE

## 2023-08-23 VITALS
SYSTOLIC BLOOD PRESSURE: 140 MMHG | DIASTOLIC BLOOD PRESSURE: 70 MMHG | HEART RATE: 85 BPM | HEIGHT: 61 IN | WEIGHT: 180 LBS | OXYGEN SATURATION: 97 % | RESPIRATION RATE: 16 BRPM | BODY MASS INDEX: 33.99 KG/M2 | TEMPERATURE: 97.6 F

## 2023-08-23 DIAGNOSIS — R55 SYNCOPE AND COLLAPSE: Primary | ICD-10-CM

## 2023-08-23 DIAGNOSIS — E11.3313 MODERATE NONPROLIFERATIVE DIABETIC RETINOPATHY OF BOTH EYES WITH MACULAR EDEMA ASSOCIATED WITH TYPE 2 DIABETES MELLITUS (HCC): ICD-10-CM

## 2023-08-23 LAB
ALBUMIN SERPL-MCNC: 3.9 G/DL (ref 3.5–5)
ALBUMIN/GLOB SERPL: 1 (ref 1.1–2.2)
ALP SERPL-CCNC: 98 U/L (ref 45–117)
ALT SERPL-CCNC: 11 U/L (ref 12–78)
ANION GAP SERPL CALC-SCNC: 5 MMOL/L (ref 5–15)
APPEARANCE UR: CLEAR
AST SERPL-CCNC: 9 U/L (ref 15–37)
BACTERIA URNS QL MICRO: NEGATIVE /HPF
BILIRUB SERPL-MCNC: 0.4 MG/DL (ref 0.2–1)
BILIRUB UR QL: NEGATIVE
BUN SERPL-MCNC: 21 MG/DL (ref 6–20)
BUN/CREAT SERPL: 20 (ref 12–20)
CALCIUM SERPL-MCNC: 9.5 MG/DL (ref 8.5–10.1)
CHLORIDE SERPL-SCNC: 105 MMOL/L (ref 97–108)
CO2 SERPL-SCNC: 28 MMOL/L (ref 21–32)
COLOR UR: ABNORMAL
COMMENT:: NORMAL
CREAT SERPL-MCNC: 1.06 MG/DL (ref 0.55–1.02)
EPITH CASTS URNS QL MICRO: ABNORMAL /LPF
ERYTHROCYTE [DISTWIDTH] IN BLOOD BY AUTOMATED COUNT: 12.2 % (ref 11.5–14.5)
GLOBULIN SER CALC-MCNC: 3.8 G/DL (ref 2–4)
GLUCOSE SERPL-MCNC: 170 MG/DL (ref 65–100)
GLUCOSE UR STRIP.AUTO-MCNC: NEGATIVE MG/DL
HCT VFR BLD AUTO: 39.4 % (ref 35–47)
HGB BLD-MCNC: 12.7 G/DL (ref 11.5–16)
HGB UR QL STRIP: NEGATIVE
HYALINE CASTS URNS QL MICRO: ABNORMAL /LPF (ref 0–5)
KETONES UR QL STRIP.AUTO: ABNORMAL MG/DL
LEUKOCYTE ESTERASE UR QL STRIP.AUTO: ABNORMAL
MCH RBC QN AUTO: 30.7 PG (ref 26–34)
MCHC RBC AUTO-ENTMCNC: 32.2 G/DL (ref 30–36.5)
MCV RBC AUTO: 95.2 FL (ref 80–99)
NITRITE UR QL STRIP.AUTO: NEGATIVE
NRBC # BLD: 0 K/UL (ref 0–0.01)
NRBC BLD-RTO: 0 PER 100 WBC
PH UR STRIP: 5.5 (ref 5–8)
PLATELET # BLD AUTO: 252 K/UL (ref 150–400)
PMV BLD AUTO: 10.3 FL (ref 8.9–12.9)
POTASSIUM SERPL-SCNC: 4.1 MMOL/L (ref 3.5–5.1)
PROT SERPL-MCNC: 7.7 G/DL (ref 6.4–8.2)
PROT UR STRIP-MCNC: ABNORMAL MG/DL
RBC # BLD AUTO: 4.14 M/UL (ref 3.8–5.2)
RBC #/AREA URNS HPF: ABNORMAL /HPF (ref 0–5)
SODIUM SERPL-SCNC: 138 MMOL/L (ref 136–145)
SP GR UR REFRACTOMETRY: 1.01 (ref 1–1.03)
SPECIMEN HOLD: NORMAL
SPECIMEN HOLD: NORMAL
TROPONIN I SERPL HS-MCNC: 4 NG/L (ref 0–51)
UROBILINOGEN UR QL STRIP.AUTO: 0.2 EU/DL (ref 0.2–1)
WBC # BLD AUTO: 11.3 K/UL (ref 3.6–11)
WBC URNS QL MICRO: ABNORMAL /HPF (ref 0–4)

## 2023-08-23 PROCEDURE — 99284 EMERGENCY DEPT VISIT MOD MDM: CPT

## 2023-08-23 PROCEDURE — 84484 ASSAY OF TROPONIN QUANT: CPT

## 2023-08-23 PROCEDURE — 85027 COMPLETE CBC AUTOMATED: CPT

## 2023-08-23 PROCEDURE — 93005 ELECTROCARDIOGRAM TRACING: CPT | Performed by: NURSE PRACTITIONER

## 2023-08-23 PROCEDURE — 6360000002 HC RX W HCPCS: Performed by: NURSE PRACTITIONER

## 2023-08-23 PROCEDURE — 36415 COLL VENOUS BLD VENIPUNCTURE: CPT

## 2023-08-23 PROCEDURE — 80053 COMPREHEN METABOLIC PANEL: CPT

## 2023-08-23 PROCEDURE — 90714 TD VACC NO PRESV 7 YRS+ IM: CPT | Performed by: NURSE PRACTITIONER

## 2023-08-23 PROCEDURE — 70450 CT HEAD/BRAIN W/O DYE: CPT

## 2023-08-23 PROCEDURE — 81001 URINALYSIS AUTO W/SCOPE: CPT

## 2023-08-23 PROCEDURE — 90471 IMMUNIZATION ADMIN: CPT | Performed by: NURSE PRACTITIONER

## 2023-08-23 PROCEDURE — 2580000003 HC RX 258: Performed by: NURSE PRACTITIONER

## 2023-08-23 RX ORDER — 0.9 % SODIUM CHLORIDE 0.9 %
1000 INTRAVENOUS SOLUTION INTRAVENOUS ONCE
Status: COMPLETED | OUTPATIENT
Start: 2023-08-23 | End: 2023-08-23

## 2023-08-23 RX ORDER — METFORMIN HYDROCHLORIDE 500 MG/1
1500 TABLET, EXTENDED RELEASE ORAL
Qty: 270 TABLET | Refills: 0 | Status: SHIPPED | OUTPATIENT
Start: 2023-08-23

## 2023-08-23 RX ADMIN — CLOSTRIDIUM TETANI TOXOID ANTIGEN (FORMALDEHYDE INACTIVATED) AND CORYNEBACTERIUM DIPHTHERIAE TOXOID ANTIGEN (FORMALDEHYDE INACTIVATED) 0.5 ML: 5; 2 INJECTION, SUSPENSION INTRAMUSCULAR at 22:17

## 2023-08-23 RX ADMIN — SODIUM CHLORIDE 1000 ML: 9 INJECTION, SOLUTION INTRAVENOUS at 21:28

## 2023-08-23 ASSESSMENT — ENCOUNTER SYMPTOMS
ABDOMINAL DISTENTION: 0
FACIAL SWELLING: 0
CHOKING: 0
COLOR CHANGE: 0
COUGH: 0
BACK PAIN: 0
VOMITING: 0
CHEST TIGHTNESS: 0
DIARRHEA: 0
BLOOD IN STOOL: 0
VOICE CHANGE: 0
ABDOMINAL PAIN: 0
APNEA: 0
WHEEZING: 0
RECTAL PAIN: 0

## 2023-08-23 ASSESSMENT — PAIN - FUNCTIONAL ASSESSMENT: PAIN_FUNCTIONAL_ASSESSMENT: NONE - DENIES PAIN

## 2023-08-23 NOTE — ED TRIAGE NOTES
Patient arrives via EMS to ER with c/o syncopal episode. Patient was outside talking to neighbor and fell backwards hitting head. + LOC unsure how long. Does have small laceration to the back of head.

## 2023-08-23 NOTE — TELEPHONE ENCOUNTER
PCP: Octavia Patel MD    Last appt: 5/18/2023     Future Appointments   Date Time Provider [address]   9/20/2023  1:40 PM Octavia Patel MD [facility] BS AMB   11/30/2023  8:20 AM Octavia Patel MD [facility] BS AMB       Requested Prescriptions     Pending Prescriptions Disp Refills    metFORMIN (GLUCOPHAGE-XR) 500 MG extended release tablet [Pharmacy Med Name: METFORMIN HCL  MG TABLET] 270 tablet 0     Sig: TAKE 3 TABLETS BY MOUTH DAILY WITH SUPPER       Prior labs and Blood pressures:  BP Readings from Last 3 Encounters:   05/18/23 138/78   02/17/23 (!) 147/91   12/21/22 124/67     Lab Results   Component Value Date/Time     02/17/2023 08:59 AM    K 4.3 02/17/2023 08:59 AM     02/17/2023 08:59 AM    CO2 31 02/17/2023 08:59 AM    BUN 15 02/17/2023 08:59 AM    GFRAA >60 08/03/2022 09:46 AM     Lab Results   Component Value Date/Time    QIM3YHIJ 6.7 02/18/2020 09:19 AM     Lab Results   Component Value Date/Time    CHOL 150 02/17/2023 08:59 AM    HDL 80 02/17/2023 08:59 AM     No results found for: VITD3, VD3RIA    Lab Results   Component Value Date/Time    TSH 1.46 02/17/2023 08:59 AM

## 2023-08-24 LAB
EKG ATRIAL RATE: 77 BPM
EKG DIAGNOSIS: NORMAL
EKG P AXIS: 42 DEGREES
EKG P-R INTERVAL: 174 MS
EKG Q-T INTERVAL: 388 MS
EKG QRS DURATION: 84 MS
EKG QTC CALCULATION (BAZETT): 439 MS
EKG R AXIS: 32 DEGREES
EKG T AXIS: 44 DEGREES
EKG VENTRICULAR RATE: 77 BPM

## 2023-08-24 PROCEDURE — 93010 ELECTROCARDIOGRAM REPORT: CPT | Performed by: SPECIALIST

## 2023-08-24 NOTE — DISCHARGE INSTRUCTIONS
Thank you for coming to the Emergency Department. All of your labs came back within normal range. Your cardiac enzymes were normal.  You were a little dry so we gave you a liter of IV fluids.   Your CT of your head was normal.  Please follow-up with your primary care provider on the also giving the name and number of cardiology I would like you to follow-up with them as well

## 2023-08-24 NOTE — ED PROVIDER NOTES
[facility] EMERGENCY DEP  EMERGENCY DEPARTMENT ENCOUNTER      Pt Name: Margarette Hernandez  MRN: [id number]  [address] [facility] [place] 1955  Date of evaluation: 8/23/2023  Provider: YONY Herring - NP    [address]       Chief Complaint   Patient presents with    Loss of Consciousness    Laceration         HISTORY OF PRESENT ILLNESS   (Location/Symptom, Timing/Onset, Context/Setting, Quality, Duration, Modifying Factors, Severity)  Note limiting factors. 55-year-old female who presents to [facility] via EMS after a ground-level fall. Patient states she was standing there talking to her neighbor she kind of got hot felt funny all over and fell back and hit the back of her head. She states she did pass out for a few seconds because her neighbors were standing over top of her her blood sugar was 202 by EMS. Patient states she ate breakfast today but she was getting ready to go in and have lunch. She never was able to walk her dog. She is alert and oriented in triage. Her pupils are pinpoint but she just had surgery and is using eyedrops. She denies chest pain, shortness of breath, abdominal pain, she denies recent cough or cold. Review of External Medical Records:     Nursing Notes were reviewed. REVIEW OF SYSTEMS    (2-9 systems for level 4, 10 or more for level 5)     Review of Systems   Constitutional:  Negative for activity change, chills, fatigue and fever. HENT:  Negative for congestion, drooling, facial swelling, hearing loss, nosebleeds, tinnitus and voice change. Respiratory:  Negative for apnea, cough, choking, chest tightness and wheezing. Cardiovascular:  Negative for chest pain. Gastrointestinal:  Negative for abdominal distention, abdominal pain, blood in stool, diarrhea, rectal pain and vomiting. Endocrine: Negative for cold intolerance, polydipsia and polyuria.    Genitourinary:  Negative for decreased urine volume, difficulty urinating, flank pain, genital sores and

## 2023-09-20 ENCOUNTER — OFFICE VISIT (OUTPATIENT)
Age: 68
End: 2023-09-20
Payer: MEDICARE

## 2023-09-20 VITALS
DIASTOLIC BLOOD PRESSURE: 72 MMHG | WEIGHT: 188.2 LBS | HEART RATE: 77 BPM | BODY MASS INDEX: 35.53 KG/M2 | TEMPERATURE: 98.4 F | RESPIRATION RATE: 18 BRPM | SYSTOLIC BLOOD PRESSURE: 130 MMHG | OXYGEN SATURATION: 97 % | HEIGHT: 61 IN

## 2023-09-20 DIAGNOSIS — I10 PRIMARY HYPERTENSION: ICD-10-CM

## 2023-09-20 DIAGNOSIS — D72.829 LEUKOCYTOSIS, UNSPECIFIED TYPE: ICD-10-CM

## 2023-09-20 DIAGNOSIS — Z12.31 ENCOUNTER FOR SCREENING MAMMOGRAM FOR BREAST CANCER: ICD-10-CM

## 2023-09-20 DIAGNOSIS — E11.9 TYPE 2 DIABETES MELLITUS WITHOUT COMPLICATION, WITHOUT LONG-TERM CURRENT USE OF INSULIN (HCC): Primary | ICD-10-CM

## 2023-09-20 LAB
ALBUMIN SERPL-MCNC: 3.9 G/DL (ref 3.5–5)
ALBUMIN/GLOB SERPL: 1.3 (ref 1.1–2.2)
ALP SERPL-CCNC: 82 U/L (ref 45–117)
ALT SERPL-CCNC: 12 U/L (ref 12–78)
ANION GAP SERPL CALC-SCNC: 1 MMOL/L (ref 5–15)
AST SERPL-CCNC: 11 U/L (ref 15–37)
BASOPHILS # BLD: 0.1 K/UL (ref 0–0.1)
BASOPHILS NFR BLD: 1 % (ref 0–1)
BILIRUB SERPL-MCNC: 0.3 MG/DL (ref 0.2–1)
BUN SERPL-MCNC: 18 MG/DL (ref 6–20)
BUN/CREAT SERPL: 18 (ref 12–20)
CALCIUM SERPL-MCNC: 9.8 MG/DL (ref 8.5–10.1)
CHLORIDE SERPL-SCNC: 106 MMOL/L (ref 97–108)
CO2 SERPL-SCNC: 30 MMOL/L (ref 21–32)
CREAT SERPL-MCNC: 1 MG/DL (ref 0.55–1.02)
DIFFERENTIAL METHOD BLD: ABNORMAL
EOSINOPHIL # BLD: 0.1 K/UL (ref 0–0.4)
EOSINOPHIL NFR BLD: 1 % (ref 0–7)
ERYTHROCYTE [DISTWIDTH] IN BLOOD BY AUTOMATED COUNT: 12.4 % (ref 11.5–14.5)
EST. AVERAGE GLUCOSE BLD GHB EST-MCNC: 134 MG/DL
GLOBULIN SER CALC-MCNC: 3.1 G/DL (ref 2–4)
GLUCOSE SERPL-MCNC: 147 MG/DL (ref 65–100)
HBA1C MFR BLD: 6.3 % (ref 4–5.6)
HCT VFR BLD AUTO: 37.4 % (ref 35–47)
HGB BLD-MCNC: 12.1 G/DL (ref 11.5–16)
IMM GRANULOCYTES # BLD AUTO: 0.1 K/UL (ref 0–0.04)
IMM GRANULOCYTES NFR BLD AUTO: 1 % (ref 0–0.5)
LYMPHOCYTES # BLD: 2.1 K/UL (ref 0.8–3.5)
LYMPHOCYTES NFR BLD: 24 % (ref 12–49)
MCH RBC QN AUTO: 31 PG (ref 26–34)
MCHC RBC AUTO-ENTMCNC: 32.4 G/DL (ref 30–36.5)
MCV RBC AUTO: 95.9 FL (ref 80–99)
MONOCYTES # BLD: 0.5 K/UL (ref 0–1)
MONOCYTES NFR BLD: 6 % (ref 5–13)
NEUTS SEG # BLD: 6 K/UL (ref 1.8–8)
NEUTS SEG NFR BLD: 67 % (ref 32–75)
NRBC # BLD: 0 K/UL (ref 0–0.01)
NRBC BLD-RTO: 0 PER 100 WBC
PLATELET # BLD AUTO: 241 K/UL (ref 150–400)
PMV BLD AUTO: 11.2 FL (ref 8.9–12.9)
POTASSIUM SERPL-SCNC: 4.6 MMOL/L (ref 3.5–5.1)
PROT SERPL-MCNC: 7 G/DL (ref 6.4–8.2)
RBC # BLD AUTO: 3.9 M/UL (ref 3.8–5.2)
SODIUM SERPL-SCNC: 137 MMOL/L (ref 136–145)
WBC # BLD AUTO: 8.9 K/UL (ref 3.6–11)

## 2023-09-20 PROCEDURE — 3078F DIAST BP <80 MM HG: CPT | Performed by: STUDENT IN AN ORGANIZED HEALTH CARE EDUCATION/TRAINING PROGRAM

## 2023-09-20 PROCEDURE — 3075F SYST BP GE 130 - 139MM HG: CPT | Performed by: STUDENT IN AN ORGANIZED HEALTH CARE EDUCATION/TRAINING PROGRAM

## 2023-09-20 PROCEDURE — G8417 CALC BMI ABV UP PARAM F/U: HCPCS | Performed by: STUDENT IN AN ORGANIZED HEALTH CARE EDUCATION/TRAINING PROGRAM

## 2023-09-20 PROCEDURE — 3044F HG A1C LEVEL LT 7.0%: CPT | Performed by: STUDENT IN AN ORGANIZED HEALTH CARE EDUCATION/TRAINING PROGRAM

## 2023-09-20 PROCEDURE — 3017F COLORECTAL CA SCREEN DOC REV: CPT | Performed by: STUDENT IN AN ORGANIZED HEALTH CARE EDUCATION/TRAINING PROGRAM

## 2023-09-20 PROCEDURE — 1123F ACP DISCUSS/DSCN MKR DOCD: CPT | Performed by: STUDENT IN AN ORGANIZED HEALTH CARE EDUCATION/TRAINING PROGRAM

## 2023-09-20 PROCEDURE — G8399 PT W/DXA RESULTS DOCUMENT: HCPCS | Performed by: STUDENT IN AN ORGANIZED HEALTH CARE EDUCATION/TRAINING PROGRAM

## 2023-09-20 PROCEDURE — 99214 OFFICE O/P EST MOD 30 MIN: CPT | Performed by: STUDENT IN AN ORGANIZED HEALTH CARE EDUCATION/TRAINING PROGRAM

## 2023-09-20 PROCEDURE — G8427 DOCREV CUR MEDS BY ELIG CLIN: HCPCS | Performed by: STUDENT IN AN ORGANIZED HEALTH CARE EDUCATION/TRAINING PROGRAM

## 2023-09-20 PROCEDURE — 2022F DILAT RTA XM EVC RTNOPTHY: CPT | Performed by: STUDENT IN AN ORGANIZED HEALTH CARE EDUCATION/TRAINING PROGRAM

## 2023-09-20 PROCEDURE — 1036F TOBACCO NON-USER: CPT | Performed by: STUDENT IN AN ORGANIZED HEALTH CARE EDUCATION/TRAINING PROGRAM

## 2023-09-20 PROCEDURE — 1090F PRES/ABSN URINE INCON ASSESS: CPT | Performed by: STUDENT IN AN ORGANIZED HEALTH CARE EDUCATION/TRAINING PROGRAM

## 2023-09-20 RX ORDER — ACETAMINOPHEN AND CODEINE PHOSPHATE 300; 30 MG/1; MG/1
TABLET ORAL
COMMUNITY

## 2023-09-20 SDOH — ECONOMIC STABILITY: FOOD INSECURITY: WITHIN THE PAST 12 MONTHS, THE FOOD YOU BOUGHT JUST DIDN'T LAST AND YOU DIDN'T HAVE MONEY TO GET MORE.: NEVER TRUE

## 2023-09-20 SDOH — ECONOMIC STABILITY: HOUSING INSECURITY
IN THE LAST 12 MONTHS, WAS THERE A TIME WHEN YOU DID NOT HAVE A STEADY PLACE TO SLEEP OR SLEPT IN A SHELTER (INCLUDING NOW)?: NO

## 2023-09-20 SDOH — ECONOMIC STABILITY: INCOME INSECURITY: HOW HARD IS IT FOR YOU TO PAY FOR THE VERY BASICS LIKE FOOD, HOUSING, MEDICAL CARE, AND HEATING?: NOT HARD AT ALL

## 2023-09-20 SDOH — ECONOMIC STABILITY: FOOD INSECURITY: WITHIN THE PAST 12 MONTHS, YOU WORRIED THAT YOUR FOOD WOULD RUN OUT BEFORE YOU GOT MONEY TO BUY MORE.: NEVER TRUE

## 2023-09-20 ASSESSMENT — PATIENT HEALTH QUESTIONNAIRE - PHQ9
2. FEELING DOWN, DEPRESSED OR HOPELESS: 0
SUM OF ALL RESPONSES TO PHQ QUESTIONS 1-9: 0
1. LITTLE INTEREST OR PLEASURE IN DOING THINGS: 0
SUM OF ALL RESPONSES TO PHQ QUESTIONS 1-9: 0
SUM OF ALL RESPONSES TO PHQ9 QUESTIONS 1 & 2: 0
SUM OF ALL RESPONSES TO PHQ QUESTIONS 1-9: 0
SUM OF ALL RESPONSES TO PHQ QUESTIONS 1-9: 0

## 2023-09-20 NOTE — PROGRESS NOTES
Josie Velasquez  76 y.o. female  1955  [address] [facility]  [id number]     [address] [facility]       Chief Complaint:  Chief Complaint   Patient presents with    Diabetes   Source: self, the medical record     Subjective  Josie Velasquez is an 76 y.o. female who presents for DM Followup  Taking metformin 1500mg with dinner, crestor 5mg. Last A1c 6.6% 5/18/23    Had an episode of syncope with collapse on 8/23/23 - states was not out for long. Had neg trop in ED with normal EKG, no ECHO. No repeat episode. States she thinks she was dehydrated. Denies any feeling of SOB, no increased fatigue, denies PEREZ, palpitations, chest pain nor pressure    HTN: on losartan 100mg, HCTZ 12.5mg. Does not take BP At home    ROS  Negative except what is mentioned in HPI        Allergies - reviewed: Allergies   Allergen Reactions    Codeine Other (See Comments)     Passed out    Lisinopril Diarrhea    Tamsulosin Other (See Comments)     presyncope         Medications - reviewed:   Current Outpatient Medications   Medication Sig    acetaminophen-codeine (TYLENOL/CODEINE #3) 300-30 MG per tablet Take by mouth. BLOOD GLUCOSE MONITOR Use to check fasting and meal time blood sugar daily. Patient may use whichever product is covered by insurance. blood glucose test strips (ASCENSIA AUTODISC VI;ONE TOUCH ULTRA TEST VI) strip Per insurance preference, test 1x/d dx diabetes E11.9    metFORMIN (GLUCOPHAGE-XR) 500 MG extended release tablet TAKE 3 TABLETS BY MOUTH DAILY WITH SUPPER    rosuvastatin (CRESTOR) 5 MG tablet TAKE 1 TABLET BY MOUTH NIGHTLY    losartan (COZAAR) 100 MG tablet TAKE 1 TABLET BY MOUTH EVERY DAY    hydroCHLOROthiazide (HYDRODIURIL) 12.5 MG tablet Take 1 tablet by mouth daily    Cholecalciferol (VITAMIN D) 50 MCG (2000 UT) CAPS capsule Take by mouth     No current facility-administered medications for this visit.          Past Medical History - reviewed:  Past Medical

## 2023-10-02 NOTE — TELEPHONE ENCOUNTER
New rx for onetouch delica lancets. Rx did not crossover. RE-entered as previously prescribed per test strips as once daily if appropriate. Thanks, Stacey Mcdonough    Last appointment: 9/20/23  MD Bard Jimenez  Next appointment: 11/30/23 MD Bard Jimenez  Previous refill encounter(s): 11/22/22 100 + 1    Requested Prescriptions     Pending Prescriptions Disp Refills    OneTouch Delica Lancets 85I MISC 100 each 1     Sig: Use to check blood glucose once daily. Dx: E11.9. Use brand covered per insurance. For Pharmacy Admin Tracking Only    Program: Medication Refill  CPA in place:    Recommendation Provided To:    Intervention Detail: New Rx: 1, reason: Patient Preference  Intervention Accepted By:   Mary Madden Closed?:    Time Spent (min): 5

## 2023-10-03 RX ORDER — LANCETS 33 GAUGE
EACH MISCELLANEOUS
Qty: 100 EACH | Refills: 1 | Status: SHIPPED | OUTPATIENT
Start: 2023-10-03

## 2023-10-10 DIAGNOSIS — I10 PRIMARY HYPERTENSION: ICD-10-CM

## 2023-10-11 RX ORDER — HYDROCHLOROTHIAZIDE 12.5 MG/1
12.5 TABLET ORAL DAILY
Qty: 90 TABLET | Refills: 0 | Status: SHIPPED | OUTPATIENT
Start: 2023-10-11

## 2023-10-11 NOTE — TELEPHONE ENCOUNTER
PCP: Mic Santiago MD    Last appt: 9/20/2023       Future Appointments   Date Time Provider [address]   11/30/2023  8:20 AM Mic Santiago MD [facility] BS AMB       Requested Prescriptions     Pending Prescriptions Disp Refills    hydroCHLOROthiazide (HYDRODIURIL) 12.5 MG tablet [Pharmacy Med Name: HYDROCHLOROTHIAZIDE 12.5 MG TB] 90 tablet 0     Sig: TAKE 1 TABLET BY MOUTH EVERY DAY       Prior labs and Blood pressures:  BP Readings from Last 3 Encounters:   09/20/23 130/72   08/23/23 (!) 140/70   05/18/23 138/78     Lab Results   Component Value Date/Time     09/20/2023 02:48 PM    K 4.6 09/20/2023 02:48 PM     09/20/2023 02:48 PM    CO2 30 09/20/2023 02:48 PM    BUN 18 09/20/2023 02:48 PM    GFRAA >60 08/03/2022 09:46 AM     Lab Results   Component Value Date/Time    AXC3AVEA 6.7 02/18/2020 09:19 AM     Lab Results   Component Value Date/Time    CHOL 150 02/17/2023 08:59 AM    HDL 80 02/17/2023 08:59 AM     No results found for: \"VITD3\", \"VD3RIA\"    Lab Results   Component Value Date/Time    TSH 1.46 02/17/2023 08:59 AM

## 2023-11-14 DIAGNOSIS — E11.3313 MODERATE NONPROLIFERATIVE DIABETIC RETINOPATHY OF BOTH EYES WITH MACULAR EDEMA ASSOCIATED WITH TYPE 2 DIABETES MELLITUS (HCC): ICD-10-CM

## 2023-11-14 RX ORDER — METFORMIN HYDROCHLORIDE 500 MG/1
1500 TABLET, EXTENDED RELEASE ORAL
Qty: 270 TABLET | Refills: 0 | Status: SHIPPED | OUTPATIENT
Start: 2023-11-14

## 2023-11-28 SDOH — HEALTH STABILITY: PHYSICAL HEALTH: ON AVERAGE, HOW MANY DAYS PER WEEK DO YOU ENGAGE IN MODERATE TO STRENUOUS EXERCISE (LIKE A BRISK WALK)?: 7 DAYS

## 2023-11-28 ASSESSMENT — PATIENT HEALTH QUESTIONNAIRE - PHQ9
1. LITTLE INTEREST OR PLEASURE IN DOING THINGS: 0
SUM OF ALL RESPONSES TO PHQ9 QUESTIONS 1 & 2: 0
SUM OF ALL RESPONSES TO PHQ QUESTIONS 1-9: 0
2. FEELING DOWN, DEPRESSED OR HOPELESS: 0

## 2023-11-28 ASSESSMENT — LIFESTYLE VARIABLES
HOW OFTEN DO YOU HAVE A DRINK CONTAINING ALCOHOL: 1
HOW OFTEN DO YOU HAVE A DRINK CONTAINING ALCOHOL: NEVER

## 2023-11-30 ENCOUNTER — OFFICE VISIT (OUTPATIENT)
Age: 68
End: 2023-11-30
Payer: MEDICARE

## 2023-11-30 VITALS
HEIGHT: 61 IN | HEART RATE: 79 BPM | BODY MASS INDEX: 35.3 KG/M2 | RESPIRATION RATE: 18 BRPM | DIASTOLIC BLOOD PRESSURE: 70 MMHG | WEIGHT: 187 LBS | TEMPERATURE: 97.8 F | OXYGEN SATURATION: 100 % | SYSTOLIC BLOOD PRESSURE: 138 MMHG

## 2023-11-30 DIAGNOSIS — D72.828 GRANULOCYTOSIS: ICD-10-CM

## 2023-11-30 DIAGNOSIS — I10 PRIMARY HYPERTENSION: ICD-10-CM

## 2023-11-30 DIAGNOSIS — Z00.00 MEDICARE ANNUAL WELLNESS VISIT, SUBSEQUENT: Primary | ICD-10-CM

## 2023-11-30 DIAGNOSIS — E11.3313 MODERATE NONPROLIFERATIVE DIABETIC RETINOPATHY OF BOTH EYES WITH MACULAR EDEMA ASSOCIATED WITH TYPE 2 DIABETES MELLITUS (HCC): ICD-10-CM

## 2023-11-30 LAB
BASOPHILS # BLD: 0.1 K/UL (ref 0–0.1)
BASOPHILS NFR BLD: 1 % (ref 0–1)
COMMENT:: NORMAL
DIFFERENTIAL METHOD BLD: ABNORMAL
EOSINOPHIL # BLD: 0.1 K/UL (ref 0–0.4)
EOSINOPHIL NFR BLD: 1 % (ref 0–7)
ERYTHROCYTE [DISTWIDTH] IN BLOOD BY AUTOMATED COUNT: 12 % (ref 11.5–14.5)
HCT VFR BLD AUTO: 37.7 % (ref 35–47)
HGB BLD-MCNC: 12.1 G/DL (ref 11.5–16)
IMM GRANULOCYTES # BLD AUTO: 0.1 K/UL (ref 0–0.04)
IMM GRANULOCYTES NFR BLD AUTO: 1 % (ref 0–0.5)
LYMPHOCYTES # BLD: 2.1 K/UL (ref 0.8–3.5)
LYMPHOCYTES NFR BLD: 21 % (ref 12–49)
MCH RBC QN AUTO: 30.6 PG (ref 26–34)
MCHC RBC AUTO-ENTMCNC: 32.1 G/DL (ref 30–36.5)
MCV RBC AUTO: 95.2 FL (ref 80–99)
MONOCYTES # BLD: 0.5 K/UL (ref 0–1)
MONOCYTES NFR BLD: 5 % (ref 5–13)
NEUTS SEG # BLD: 7.1 K/UL (ref 1.8–8)
NEUTS SEG NFR BLD: 71 % (ref 32–75)
NRBC # BLD: 0 K/UL (ref 0–0.01)
NRBC BLD-RTO: 0 PER 100 WBC
PLATELET # BLD AUTO: 240 K/UL (ref 150–400)
PMV BLD AUTO: 10.8 FL (ref 8.9–12.9)
RBC # BLD AUTO: 3.96 M/UL (ref 3.8–5.2)
SPECIMEN HOLD: NORMAL
WBC # BLD AUTO: 9.9 K/UL (ref 3.6–11)

## 2023-11-30 PROCEDURE — 3078F DIAST BP <80 MM HG: CPT | Performed by: STUDENT IN AN ORGANIZED HEALTH CARE EDUCATION/TRAINING PROGRAM

## 2023-11-30 PROCEDURE — 99214 OFFICE O/P EST MOD 30 MIN: CPT | Performed by: STUDENT IN AN ORGANIZED HEALTH CARE EDUCATION/TRAINING PROGRAM

## 2023-11-30 PROCEDURE — 2022F DILAT RTA XM EVC RTNOPTHY: CPT | Performed by: STUDENT IN AN ORGANIZED HEALTH CARE EDUCATION/TRAINING PROGRAM

## 2023-11-30 PROCEDURE — 3075F SYST BP GE 130 - 139MM HG: CPT | Performed by: STUDENT IN AN ORGANIZED HEALTH CARE EDUCATION/TRAINING PROGRAM

## 2023-11-30 PROCEDURE — G8417 CALC BMI ABV UP PARAM F/U: HCPCS | Performed by: STUDENT IN AN ORGANIZED HEALTH CARE EDUCATION/TRAINING PROGRAM

## 2023-11-30 PROCEDURE — 3044F HG A1C LEVEL LT 7.0%: CPT | Performed by: STUDENT IN AN ORGANIZED HEALTH CARE EDUCATION/TRAINING PROGRAM

## 2023-11-30 PROCEDURE — G0439 PPPS, SUBSEQ VISIT: HCPCS | Performed by: STUDENT IN AN ORGANIZED HEALTH CARE EDUCATION/TRAINING PROGRAM

## 2023-11-30 PROCEDURE — G8399 PT W/DXA RESULTS DOCUMENT: HCPCS | Performed by: STUDENT IN AN ORGANIZED HEALTH CARE EDUCATION/TRAINING PROGRAM

## 2023-11-30 PROCEDURE — 3017F COLORECTAL CA SCREEN DOC REV: CPT | Performed by: STUDENT IN AN ORGANIZED HEALTH CARE EDUCATION/TRAINING PROGRAM

## 2023-11-30 PROCEDURE — 1090F PRES/ABSN URINE INCON ASSESS: CPT | Performed by: STUDENT IN AN ORGANIZED HEALTH CARE EDUCATION/TRAINING PROGRAM

## 2023-11-30 PROCEDURE — G8427 DOCREV CUR MEDS BY ELIG CLIN: HCPCS | Performed by: STUDENT IN AN ORGANIZED HEALTH CARE EDUCATION/TRAINING PROGRAM

## 2023-11-30 PROCEDURE — 1036F TOBACCO NON-USER: CPT | Performed by: STUDENT IN AN ORGANIZED HEALTH CARE EDUCATION/TRAINING PROGRAM

## 2023-11-30 PROCEDURE — G8484 FLU IMMUNIZE NO ADMIN: HCPCS | Performed by: STUDENT IN AN ORGANIZED HEALTH CARE EDUCATION/TRAINING PROGRAM

## 2023-11-30 PROCEDURE — 1123F ACP DISCUSS/DSCN MKR DOCD: CPT | Performed by: STUDENT IN AN ORGANIZED HEALTH CARE EDUCATION/TRAINING PROGRAM

## 2023-11-30 ASSESSMENT — LIFESTYLE VARIABLES
HOW MANY STANDARD DRINKS CONTAINING ALCOHOL DO YOU HAVE ON A TYPICAL DAY: 1 OR 2
HOW OFTEN DO YOU HAVE A DRINK CONTAINING ALCOHOL: MONTHLY OR LESS

## 2023-12-01 LAB — PERIPHERAL SMEAR, MD REVIEW: NORMAL

## 2024-01-20 DIAGNOSIS — I10 PRIMARY HYPERTENSION: ICD-10-CM

## 2024-01-22 RX ORDER — HYDROCHLOROTHIAZIDE 12.5 MG/1
12.5 TABLET ORAL DAILY
Qty: 90 TABLET | Refills: 1 | Status: SHIPPED | OUTPATIENT
Start: 2024-01-22

## 2024-01-22 NOTE — TELEPHONE ENCOUNTER
PCP: Alissa Rob MD    Last appt: 11/30/2023     Future Appointments   Date Time Provider Department Center   5/30/2024  8:15 AM Abigail Olson, APRN - NP [facility] BS AMB       Requested Prescriptions     Pending Prescriptions Disp Refills    hydroCHLOROthiazide (HYDRODIURIL) 12.5 MG tablet [Pharmacy Med Name: HYDROCHLOROTHIAZIDE 12.5 MG TB] 90 tablet 0     Sig: TAKE 1 TABLET BY MOUTH EVERY DAY       Prior labs and Blood pressures:  BP Readings from Last 3 Encounters:   11/30/23 138/70   09/20/23 130/72   08/23/23 (!) 140/70     Lab Results   Component Value Date/Time     09/20/2023 02:48 PM    K 4.6 09/20/2023 02:48 PM     09/20/2023 02:48 PM    CO2 30 09/20/2023 02:48 PM    BUN 18 09/20/2023 02:48 PM    GFRAA >60 08/03/2022 09:46 AM     Lab Results   Component Value Date/Time    [id number] 6.7 02/18/2020 09:19 AM     Lab Results   Component Value Date/Time    CHOL 150 02/17/2023 08:59 AM    HDL 80 02/17/2023 08:59 AM     No results found for: \"VITD3\", \"VD3RIA\"    Lab Results   Component Value Date/Time    TSH 1.46 02/17/2023 08:59 AM

## 2024-02-05 DIAGNOSIS — E11.3313 MODERATE NONPROLIFERATIVE DIABETIC RETINOPATHY OF BOTH EYES WITH MACULAR EDEMA ASSOCIATED WITH TYPE 2 DIABETES MELLITUS (HCC): ICD-10-CM

## 2024-02-05 NOTE — TELEPHONE ENCOUNTER
PCP: Alissa Rob MD    Last appt: 11/30/2023     Future Appointments   Date Time Provider Department Center   5/30/2024  8:15 AM Abigail Olson, APRN - NP [facility] BS AMB       Requested Prescriptions     Pending Prescriptions Disp Refills    metFORMIN (GLUCOPHAGE-XR) 500 MG extended release tablet [Pharmacy Med Name: METFORMIN HCL  MG TABLET] 270 tablet 0     Sig: TAKE 3 TABLETS BY MOUTH DAILY WITH SUPPER       Prior labs and Blood pressures:  BP Readings from Last 3 Encounters:   11/30/23 138/70   09/20/23 130/72   08/23/23 (!) 140/70     Lab Results   Component Value Date/Time     09/20/2023 02:48 PM    K 4.6 09/20/2023 02:48 PM     09/20/2023 02:48 PM    CO2 30 09/20/2023 02:48 PM    BUN 18 09/20/2023 02:48 PM    GFRAA >60 08/03/2022 09:46 AM     Lab Results   Component Value Date/Time    [id number] 6.7 02/18/2020 09:19 AM     Lab Results   Component Value Date/Time    CHOL 150 02/17/2023 08:59 AM    HDL 80 02/17/2023 08:59 AM     No results found for: \"VITD3\", \"VD3RIA\"    Lab Results   Component Value Date/Time    TSH 1.46 02/17/2023 08:59 AM       (0) independent

## 2024-02-06 RX ORDER — METFORMIN HYDROCHLORIDE 500 MG/1
1500 TABLET, EXTENDED RELEASE ORAL
Qty: 270 TABLET | Refills: 1 | Status: SHIPPED | OUTPATIENT
Start: 2024-02-06

## 2024-02-20 DIAGNOSIS — E78.49 OTHER HYPERLIPIDEMIA: ICD-10-CM

## 2024-02-20 RX ORDER — ROSUVASTATIN CALCIUM 5 MG/1
5 TABLET, COATED ORAL NIGHTLY
Qty: 90 TABLET | Refills: 0 | Status: SHIPPED | OUTPATIENT
Start: 2024-02-20

## 2024-02-20 NOTE — TELEPHONE ENCOUNTER
PCP: Alissa Rob MD    Last appt: 11/30/2023     Future Appointments   Date Time Provider Department Center   5/30/2024  8:15 AM Abigail Olson, APRN - NP [facility] BS AMB       Requested Prescriptions     Pending Prescriptions Disp Refills    rosuvastatin (CRESTOR) 5 MG tablet [Pharmacy Med Name: ROSUVASTATIN CALCIUM 5 MG TAB] 90 tablet 1     Sig: TAKE 1 TABLET BY MOUTH EVERY DAY AT NIGHT       Prior labs and Blood pressures:  BP Readings from Last 3 Encounters:   11/30/23 138/70   09/20/23 130/72   08/23/23 (!) 140/70     Lab Results   Component Value Date/Time     09/20/2023 02:48 PM    K 4.6 09/20/2023 02:48 PM     09/20/2023 02:48 PM    CO2 30 09/20/2023 02:48 PM    BUN 18 09/20/2023 02:48 PM    GFRAA >60 08/03/2022 09:46 AM     Lab Results   Component Value Date/Time    [id number] 6.7 02/18/2020 09:19 AM     Lab Results   Component Value Date/Time    CHOL 150 02/17/2023 08:59 AM    HDL 80 02/17/2023 08:59 AM     No results found for: \"VITD3\", \"VD3RIA\"    Lab Results   Component Value Date/Time    TSH 1.46 02/17/2023 08:59 AM

## 2024-04-17 DIAGNOSIS — E11.9 TYPE 2 DIABETES MELLITUS WITHOUT COMPLICATIONS (HCC): ICD-10-CM

## 2024-04-17 RX ORDER — BLOOD SUGAR DIAGNOSTIC
STRIP MISCELLANEOUS
Qty: 100 STRIP | Refills: 3 | Status: SHIPPED | OUTPATIENT
Start: 2024-04-17

## 2024-04-17 NOTE — TELEPHONE ENCOUNTER
PCP: Alissa Rob MD    Last appt: 11/30/2023     Future Appointments   Date Time Provider Department Center   5/31/2024  8:15 AM Abgiail Olson, APRN - NP [facility] BS AMB       Requested Prescriptions     Pending Prescriptions Disp Refills    ONETOUCH ULTRA strip [Pharmacy Med Name: ONE TOUCH ULTRA BLUE TEST STRP] 100 strip 3     Sig: PER INSURANCE PREFERENCE, TEST 1 TIME /DAY FOR DIABETES       Prior labs and Blood pressures:  BP Readings from Last 3 Encounters:   11/30/23 138/70   09/20/23 130/72   08/23/23 (!) 140/70     Lab Results   Component Value Date/Time     09/20/2023 02:48 PM    K 4.6 09/20/2023 02:48 PM     09/20/2023 02:48 PM    CO2 30 09/20/2023 02:48 PM    BUN 18 09/20/2023 02:48 PM    GFRAA >60 08/03/2022 09:46 AM     Lab Results   Component Value Date/Time    JLU4VRFF 6.7 02/18/2020 09:19 AM     Lab Results   Component Value Date/Time    CHOL 150 02/17/2023 08:59 AM    HDL 80 02/17/2023 08:59 AM     No results found for: \"VITD3\", \"VD3RIA\"    Lab Results   Component Value Date/Time    TSH 1.46 02/17/2023 08:59 AM

## 2024-04-17 NOTE — TELEPHONE ENCOUNTER
Patient call stating is out of the test strips and needs sent today.  Thanks, Ashley    Last appointment: 11/30/23 Liane  Next appointment: 5/30/24 [facility]- To re-establish care  Previous refill encounter(s): 3/21/23 100 + 3    Requested Prescriptions     Pending Prescriptions Disp Refills    ONETOUCH ULTRA strip [Pharmacy Med Name: ONE TOUCH ULTRA BLUE TEST STRP] 100 strip 3     Sig: PER INSURANCE PREFERENCE, TEST 1 TIME /DAY FOR DIABETES     For Pharmacy Admin Tracking Only    Program: Medication Refill  CPA in place:    Recommendation Provided To:   Intervention Detail: New Rx: 1, reason: Patient Preference  Intervention Accepted By:   Gap Closed?:    Time Spent (min): 5

## 2024-04-19 DIAGNOSIS — I10 PRIMARY HYPERTENSION: ICD-10-CM

## 2024-04-19 RX ORDER — LOSARTAN POTASSIUM 100 MG/1
TABLET ORAL
Qty: 90 TABLET | Refills: 0 | Status: SHIPPED | OUTPATIENT
Start: 2024-04-19

## 2024-04-19 NOTE — TELEPHONE ENCOUNTER
PCP: Alissa Rob MD    Last appt: 11/30/2023     Future Appointments   Date Time Provider Department Center   5/31/2024  8:15 AM Abigail Olson, APRN - NP [facility] BS AMB       Requested Prescriptions     Pending Prescriptions Disp Refills    losartan (COZAAR) 100 MG tablet [Pharmacy Med Name: LOSARTAN POTASSIUM 100 MG TAB] 90 tablet 0     Sig: TAKE 1 TABLET BY MOUTH EVERY DAY       Prior labs and Blood pressures:  BP Readings from Last 3 Encounters:   11/30/23 138/70   09/20/23 130/72   08/23/23 (!) 140/70     Lab Results   Component Value Date/Time     09/20/2023 02:48 PM    K 4.6 09/20/2023 02:48 PM     09/20/2023 02:48 PM    CO2 30 09/20/2023 02:48 PM    BUN 18 09/20/2023 02:48 PM    GFRAA >60 08/03/2022 09:46 AM     Lab Results   Component Value Date/Time    [id number] 6.7 02/18/2020 09:19 AM     Lab Results   Component Value Date/Time    CHOL 150 02/17/2023 08:59 AM    HDL 80 02/17/2023 08:59 AM     No results found for: \"VITD3\", \"VD3RIA\"    Lab Results   Component Value Date/Time    TSH 1.46 02/17/2023 08:59 AM

## 2024-05-06 NOTE — TELEPHONE ENCOUNTER
PCP: Alissa Rob MD    Last appt: 11/30/2023   Future Appointments   Date Time Provider Department Center   5/31/2024  8:15 AM Abigail Olson, APRN - NP [facility] BS AMB       Requested Prescriptions     Pending Prescriptions Disp Refills    OneTouch Delica Lancets 33G MISC [Pharmacy Med Name: ONETOUCH DELICA PLUS 33G LANCT] 100 each 1     Sig: Use to check blood glucose once daily.  Dx: E11.9.  Use brand covered per insurance.         Prior labs and Blood pressures:  BP Readings from Last 3 Encounters:   11/30/23 138/70   09/20/23 130/72   08/23/23 (!) 140/70     Lab Results   Component Value Date/Time     09/20/2023 02:48 PM    K 4.6 09/20/2023 02:48 PM     09/20/2023 02:48 PM    CO2 30 09/20/2023 02:48 PM    BUN 18 09/20/2023 02:48 PM    GFRAA >60 08/03/2022 09:46 AM     Lab Results   Component Value Date/Time    OLD3CBKL 6.7 02/18/2020 09:19 AM     Lab Results   Component Value Date/Time    CHOL 150 02/17/2023 08:59 AM    HDL 80 02/17/2023 08:59 AM    LDL 59.2 02/17/2023 08:59 AM    VLDL 10.8 02/17/2023 08:59 AM     No results found for: \"VITD3\", \"VD3RIA\"    Lab Results   Component Value Date/Time    TSH 1.46 02/17/2023 08:59 AM

## 2024-05-07 RX ORDER — LANCETS 33 GAUGE
EACH MISCELLANEOUS
Qty: 100 EACH | Refills: 1 | OUTPATIENT
Start: 2024-05-07

## 2024-05-07 NOTE — TELEPHONE ENCOUNTER
Patient call for refill of one touch lancets.  (Previous Liane patient transferring to [facility].)  Bessie Luquei    Last appointment: 11/30/23 Liane  Next appointment: 5/30/24 [facility]  Previous refill encounter(s): 10/3/23 100 + 1    Requested Prescriptions     Pending Prescriptions Disp Refills    OneTouch Delica Lancets 33G MISC 100 each 1     Sig: Use to check blood glucose once daily.  Dx: E11.9.  Use brand covered per insurance.     For Pharmacy Admin Tracking Only    Program: Medication Refill  CPA in place:    Recommendation Provided To:   Intervention Detail: New Rx: 1, reason: Patient Preference  Intervention Accepted By:   Gap Closed?:    Time Spent (min): 5

## 2024-05-08 RX ORDER — LANCETS 33 GAUGE
EACH MISCELLANEOUS
Qty: 100 EACH | Refills: 1 | Status: SHIPPED | OUTPATIENT
Start: 2024-05-08

## 2024-05-16 ENCOUNTER — TELEPHONE (OUTPATIENT)
Age: 69
End: 2024-05-16

## 2024-05-16 ENCOUNTER — OFFICE VISIT (OUTPATIENT)
Age: 69
End: 2024-05-16
Payer: MEDICARE

## 2024-05-16 VITALS
HEART RATE: 81 BPM | OXYGEN SATURATION: 100 % | BODY MASS INDEX: 34.96 KG/M2 | WEIGHT: 185 LBS | TEMPERATURE: 98.2 F | DIASTOLIC BLOOD PRESSURE: 78 MMHG | RESPIRATION RATE: 16 BRPM | SYSTOLIC BLOOD PRESSURE: 177 MMHG

## 2024-05-16 DIAGNOSIS — K61.0 PERIANAL ABSCESS: Primary | ICD-10-CM

## 2024-05-16 PROBLEM — E11.3313 MODERATE NONPROLIFERATIVE DIABETIC RETINOPATHY OF BOTH EYES WITH MACULAR EDEMA ASSOCIATED WITH TYPE 2 DIABETES MELLITUS (HCC): Status: RESOLVED | Noted: 2020-09-22 | Resolved: 2024-05-16

## 2024-05-16 PROCEDURE — 99213 OFFICE O/P EST LOW 20 MIN: CPT | Performed by: NURSE PRACTITIONER

## 2024-05-16 ASSESSMENT — ENCOUNTER SYMPTOMS: RECTAL PAIN: 1

## 2024-05-16 NOTE — PROGRESS NOTES
Chief Complaint   Patient presents with    Cyst     Near anus, hard and painful x4 days       \"Have you been to the ER, urgent care clinic since your last visit?  Hospitalized since your last visit?\"    NO    “Have you seen or consulted any other health care providers outside of [facility] since your last visit?”    NO      Click Here for Release of Records Request          11/28/2023    11:22 AM   PHQ-9    Little interest or pleasure in doing things 0   Feeling down, depressed, or hopeless 0   PHQ-2 Score 0   PHQ-9 Total Score 0       Health Maintenance Due   Topic Date Due    Respiratory Syncytial Virus (RSV) Pregnant or age 60 yrs+ (1 - 1-dose 60+ series) Never done    DTaP/Tdap/Td vaccine (1 - Tdap) 08/24/2023    COVID-19 Vaccine (5 - 2023-24 season) 09/01/2023    Diabetic Alb to Cr ratio (uACR) test  02/17/2024    Lipids  02/17/2024    Diabetic foot exam  05/18/2024

## 2024-05-16 NOTE — PROGRESS NOTES
[facility]  Clinic Note     Tonya Barrios (: 1955) is a 69 y.o. female, established patient, here for evaluation of the following chief complaint(s):  Cyst (Near anus, hard and painful x4 days)       ASSESSMENT/PLAN:  1. Perianal abscess  -     [facility] - Gordy Newman MD, Colorectal Surgery, [place] ([address])  - [facility] called. No openings today or tomorrow. Coordination of care 15min. Called Dr. Celis office @ [facility] as she is a patient of their office. Dr. Newman is able to see the patient today. Instructions provided to patient. She agrees to proceed directly to appointment.       Return if symptoms worsen or fail to improve.        SUBJECTIVE/OBJECTIVE:    Tonya Barrios is a 69 y.o. female seen today for pain near the anus.  First noticed on , 24 when cleaning herself felt an abnormality which she thought it was a hemorrhoid. The pain has become worse over the last 1-2 days.  It has kept her up at night.   No fever. No abdominal pain. No blood in the stool or any known discharge from the area.         CURRENT MEDICATIONS:  Current Outpatient Medications   Medication Sig    OneTouch Delica Lancets 33G MISC Use to check blood glucose once daily.  Dx: E11.9.  Use brand covered per insurance.    losartan (COZAAR) 100 MG tablet TAKE 1 TABLET BY MOUTH EVERY DAY    ONETOUCH ULTRA strip PER INSURANCE PREFERENCE, TEST 1 TIME /DAY FOR DIABETES    rosuvastatin (CRESTOR) 5 MG tablet TAKE 1 TABLET BY MOUTH EVERY DAY AT NIGHT    metFORMIN (GLUCOPHAGE-XR) 500 MG extended release tablet TAKE 3 TABLETS BY MOUTH DAILY WITH SUPPER    hydroCHLOROthiazide (HYDRODIURIL) 12.5 MG tablet TAKE 1 TABLET BY MOUTH EVERY DAY    acetaminophen-codeine (TYLENOL/CODEINE #3) 300-30 MG per tablet Take by mouth.    BLOOD GLUCOSE MONITOR Use to check fasting and meal time blood sugar daily. Patient may use whichever product is covered by insurance.

## 2024-05-16 NOTE — TELEPHONE ENCOUNTER
Called pt. She was very pleased with visit with Dr. Newman. Numbed area, lanced and drained it. Already feeling much better. Pt is going back in one month to have it checked and will be having colonoscopy again in November.     ----- Message from YONY Hector NP sent at 5/16/2024  3:13 PM EDT -----  Regarding: patient follow up  Can you call patient to see how she is doing? How the appointment w/ dr. Newman went or the plan going forward?

## 2024-05-18 DIAGNOSIS — E78.49 OTHER HYPERLIPIDEMIA: ICD-10-CM

## 2024-05-20 RX ORDER — ROSUVASTATIN CALCIUM 5 MG/1
5 TABLET, COATED ORAL NIGHTLY
Qty: 90 TABLET | Refills: 0 | Status: SHIPPED | OUTPATIENT
Start: 2024-05-20

## 2024-05-20 NOTE — TELEPHONE ENCOUNTER
PCP: Alissa Rob MD    Last appt: 5/16/2024   Future Appointments   Date Time Provider Department Center   6/3/2024 10:45 AM Abigail Olson, APRN - NP [facility] BS AMB       Requested Prescriptions     Pending Prescriptions Disp Refills    rosuvastatin (CRESTOR) 5 MG tablet [Pharmacy Med Name: ROSUVASTATIN CALCIUM 5 MG TAB] 90 tablet 0     Sig: TAKE 1 TABLET BY MOUTH EVERY DAY AT NIGHT         Prior labs and Blood pressures:  BP Readings from Last 3 Encounters:   05/16/24 (!) 177/78   11/30/23 138/70   09/20/23 130/72     Lab Results   Component Value Date/Time     09/20/2023 02:48 PM    K 4.6 09/20/2023 02:48 PM     09/20/2023 02:48 PM    CO2 30 09/20/2023 02:48 PM    BUN 18 09/20/2023 02:48 PM    GFRAA >60 08/03/2022 09:46 AM     Lab Results   Component Value Date/Time    [id number] 6.7 02/18/2020 09:19 AM     Lab Results   Component Value Date/Time    CHOL 150 02/17/2023 08:59 AM    HDL 80 02/17/2023 08:59 AM    LDL 59.2 02/17/2023 08:59 AM    VLDL 10.8 02/17/2023 08:59 AM     No results found for: \"VITD3\"    Lab Results   Component Value Date/Time    TSH 1.46 02/17/2023 08:59 AM

## 2024-06-03 ENCOUNTER — OFFICE VISIT (OUTPATIENT)
Age: 69
End: 2024-06-03
Payer: MEDICARE

## 2024-06-03 VITALS
WEIGHT: 184 LBS | HEART RATE: 92 BPM | SYSTOLIC BLOOD PRESSURE: 164 MMHG | TEMPERATURE: 97.9 F | OXYGEN SATURATION: 100 % | RESPIRATION RATE: 16 BRPM | DIASTOLIC BLOOD PRESSURE: 84 MMHG | BODY MASS INDEX: 34.77 KG/M2

## 2024-06-03 DIAGNOSIS — E11.3313 MODERATE NONPROLIFERATIVE DIABETIC RETINOPATHY OF BOTH EYES WITH MACULAR EDEMA ASSOCIATED WITH TYPE 2 DIABETES MELLITUS (HCC): ICD-10-CM

## 2024-06-03 DIAGNOSIS — I10 PRIMARY HYPERTENSION: ICD-10-CM

## 2024-06-03 DIAGNOSIS — E11.9 TYPE 2 DIABETES MELLITUS WITHOUT COMPLICATION, WITHOUT LONG-TERM CURRENT USE OF INSULIN (HCC): ICD-10-CM

## 2024-06-03 DIAGNOSIS — K61.0 PERIANAL ABSCESS: ICD-10-CM

## 2024-06-03 DIAGNOSIS — E78.49 OTHER HYPERLIPIDEMIA: ICD-10-CM

## 2024-06-03 DIAGNOSIS — Z76.89 ENCOUNTER TO ESTABLISH CARE: Primary | ICD-10-CM

## 2024-06-03 LAB
ALBUMIN SERPL-MCNC: 3.7 G/DL (ref 3.5–5)
ALBUMIN/GLOB SERPL: 1.1 (ref 1.1–2.2)
ALP SERPL-CCNC: 75 U/L (ref 45–117)
ALT SERPL-CCNC: 9 U/L (ref 12–78)
ANION GAP SERPL CALC-SCNC: 1 MMOL/L (ref 5–15)
AST SERPL-CCNC: 8 U/L (ref 15–37)
BILIRUB SERPL-MCNC: 0.5 MG/DL (ref 0.2–1)
BUN SERPL-MCNC: 16 MG/DL (ref 6–20)
BUN/CREAT SERPL: 16 (ref 12–20)
CALCIUM SERPL-MCNC: 9.7 MG/DL (ref 8.5–10.1)
CHLORIDE SERPL-SCNC: 109 MMOL/L (ref 97–108)
CHOLEST SERPL-MCNC: 143 MG/DL
CO2 SERPL-SCNC: 29 MMOL/L (ref 21–32)
CREAT SERPL-MCNC: 1 MG/DL (ref 0.55–1.02)
CREAT UR-MCNC: 107 MG/DL
GLOBULIN SER CALC-MCNC: 3.3 G/DL (ref 2–4)
GLUCOSE SERPL-MCNC: 127 MG/DL (ref 65–100)
HBA1C MFR BLD: 6.7 %
HDLC SERPL-MCNC: 81 MG/DL
HDLC SERPL: 1.8 (ref 0–5)
LDLC SERPL CALC-MCNC: 50 MG/DL (ref 0–100)
MICROALBUMIN UR-MCNC: 1.55 MG/DL
MICROALBUMIN/CREAT UR-RTO: 14 MG/G (ref 0–30)
POTASSIUM SERPL-SCNC: 5 MMOL/L (ref 3.5–5.1)
PROT SERPL-MCNC: 7 G/DL (ref 6.4–8.2)
SODIUM SERPL-SCNC: 139 MMOL/L (ref 136–145)
TRIGL SERPL-MCNC: 60 MG/DL
VLDLC SERPL CALC-MCNC: 12 MG/DL

## 2024-06-03 PROCEDURE — 83036 HEMOGLOBIN GLYCOSYLATED A1C: CPT | Performed by: NURSE PRACTITIONER

## 2024-06-03 PROCEDURE — 99214 OFFICE O/P EST MOD 30 MIN: CPT | Performed by: NURSE PRACTITIONER

## 2024-06-03 PROCEDURE — 2022F DILAT RTA XM EVC RTNOPTHY: CPT | Performed by: NURSE PRACTITIONER

## 2024-06-03 PROCEDURE — PBSHW AMB POC HEMOGLOBIN A1C: Performed by: NURSE PRACTITIONER

## 2024-06-03 PROCEDURE — 1090F PRES/ABSN URINE INCON ASSESS: CPT | Performed by: NURSE PRACTITIONER

## 2024-06-03 PROCEDURE — 1036F TOBACCO NON-USER: CPT | Performed by: NURSE PRACTITIONER

## 2024-06-03 PROCEDURE — 3077F SYST BP >= 140 MM HG: CPT | Performed by: NURSE PRACTITIONER

## 2024-06-03 PROCEDURE — G8399 PT W/DXA RESULTS DOCUMENT: HCPCS | Performed by: NURSE PRACTITIONER

## 2024-06-03 PROCEDURE — 1123F ACP DISCUSS/DSCN MKR DOCD: CPT | Performed by: NURSE PRACTITIONER

## 2024-06-03 PROCEDURE — 3079F DIAST BP 80-89 MM HG: CPT | Performed by: NURSE PRACTITIONER

## 2024-06-03 PROCEDURE — 3046F HEMOGLOBIN A1C LEVEL >9.0%: CPT | Performed by: NURSE PRACTITIONER

## 2024-06-03 PROCEDURE — 3017F COLORECTAL CA SCREEN DOC REV: CPT | Performed by: NURSE PRACTITIONER

## 2024-06-03 PROCEDURE — G8427 DOCREV CUR MEDS BY ELIG CLIN: HCPCS | Performed by: NURSE PRACTITIONER

## 2024-06-03 PROCEDURE — G8417 CALC BMI ABV UP PARAM F/U: HCPCS | Performed by: NURSE PRACTITIONER

## 2024-06-03 RX ORDER — ROSUVASTATIN CALCIUM 5 MG/1
5 TABLET, COATED ORAL NIGHTLY
Qty: 90 TABLET | Refills: 0 | Status: SHIPPED | OUTPATIENT
Start: 2024-06-03

## 2024-06-03 RX ORDER — HYDROCHLOROTHIAZIDE 12.5 MG/1
12.5 TABLET ORAL DAILY
Qty: 90 TABLET | Refills: 0 | Status: SHIPPED | OUTPATIENT
Start: 2024-06-03

## 2024-06-03 RX ORDER — METFORMIN HYDROCHLORIDE 500 MG/1
1500 TABLET, EXTENDED RELEASE ORAL
Qty: 270 TABLET | Refills: 1 | Status: SHIPPED | OUTPATIENT
Start: 2024-06-03

## 2024-06-03 RX ORDER — BLOOD SUGAR DIAGNOSTIC
STRIP MISCELLANEOUS
Qty: 100 STRIP | Refills: 3 | Status: SHIPPED | OUTPATIENT
Start: 2024-06-03

## 2024-06-03 RX ORDER — LANCETS 33 GAUGE
EACH MISCELLANEOUS
Qty: 100 EACH | Refills: 1 | Status: SHIPPED | OUTPATIENT
Start: 2024-06-03

## 2024-06-03 RX ORDER — LOSARTAN POTASSIUM 100 MG/1
100 TABLET ORAL DAILY
Qty: 90 TABLET | Refills: 0 | Status: SHIPPED | OUTPATIENT
Start: 2024-06-03

## 2024-06-03 ASSESSMENT — PATIENT HEALTH QUESTIONNAIRE - PHQ9
SUM OF ALL RESPONSES TO PHQ9 QUESTIONS 1 & 2: 0
2. FEELING DOWN, DEPRESSED OR HOPELESS: NOT AT ALL
SUM OF ALL RESPONSES TO PHQ QUESTIONS 1-9: 0
1. LITTLE INTEREST OR PLEASURE IN DOING THINGS: NOT AT ALL

## 2024-06-03 NOTE — ASSESSMENT & PLAN NOTE
Monitored by specialist- no acute findings meriting change in the plan. Managed by Phuc Sanchez. Receives [facility].

## 2024-06-03 NOTE — PROGRESS NOTES
[facility]  Clinic Note     Tonya Barrios (: 1955) is a 69 y.o. female, established patient, here for evaluation of the following chief complaint(s):  Establish Care and Follow-up Chronic Condition       ASSESSMENT/PLAN:  1. Encounter to establish care    2. Type 2 diabetes mellitus without complication, without long-term current use of insulin (HCC)  Assessment & Plan:   Well-controlled, continue current medications. A1c 6.3 on 23, today, 6/3/24 = 6.7  Orders:  -     AMB POC HEMOGLOBIN A1C  -     Comprehensive Metabolic Panel; Future  -     Lipid Panel; Future  -     Microalbumin / Creatinine Urine Ratio; Future  -     HM DIABETES FOOT EXAM  -     losartan (COZAAR) 100 MG tablet; Take 1 tablet by mouth daily, Disp-90 tablet, R-0Normal  -     metFORMIN (GLUCOPHAGE-XR) 500 MG extended release tablet; Take 3 tablets by mouth Daily with supper, Disp-270 tablet, R-1Normal  -     OneTouch Delica Lancets 33G MISC; Use to check blood glucose once daily.  Dx: E11.9.  Use brand covered per insurance., Disp-100 each, R-1Normal  -     blood glucose test strips (ONETOUCH ULTRA) strip; PER INSURANCE PREFERENCE, TEST 1 TIME /DAY FOR DIABETES, Disp-100 strip, R-3Normal  -     rosuvastatin (CRESTOR) 5 MG tablet; Take 1 tablet by mouth nightly, Disp-90 tablet, R-0Normal    3. Moderate nonproliferative diabetic retinopathy of both eyes with macular edema associated with type 2 diabetes mellitus (HCC)  Assessment & Plan:   Monitored by specialist- no acute findings meriting change in the plan. Managed by Phuc Sanchez. Receives [facility].  Orders:  -     metFORMIN (GLUCOPHAGE-XR) 500 MG extended release tablet; Take 3 tablets by mouth Daily with supper, Disp-270 tablet, R-1Normal    4. Primary hypertension  - Not controlled.  Patient has been instructed to begin home blood pressure monitoring.  She will provide readings over the next week for review.  Discussed blood pressure medication adjustment

## 2024-06-03 NOTE — PROGRESS NOTES
Chief Complaint   Patient presents with    Establish Care    Follow-up Chronic Condition       \"Have you been to the ER, urgent care clinic since your last visit?  Hospitalized since your last visit?\"    NO    “Have you seen or consulted any other health care providers outside of [facility] since your last visit?”    YES - When: approximately 2  weeks ago.  Where and Why: dr valenzuela.      Click Here for Release of Records Request          6/3/2024    11:11 AM   PHQ-9    Little interest or pleasure in doing things 0   Feeling down, depressed, or hopeless 0   PHQ-2 Score 0   PHQ-9 Total Score 0       Health Maintenance Due   Topic Date Due    Respiratory Syncytial Virus (RSV) Pregnant or age 60 yrs+ (1 - 1-dose 60+ series) Never done    DTaP/Tdap/Td vaccine (1 - Tdap) 08/24/2023    COVID-19 Vaccine (5 - 2023-24 season) 09/01/2023    Diabetic Alb to Cr ratio (uACR) test  02/17/2024    Lipids  02/17/2024    Diabetic foot exam  05/18/2024

## 2024-10-08 DIAGNOSIS — I10 PRIMARY HYPERTENSION: ICD-10-CM

## 2024-10-08 RX ORDER — HYDROCHLOROTHIAZIDE 12.5 MG/1
12.5 TABLET ORAL DAILY
Qty: 30 TABLET | Refills: 0 | Status: SHIPPED | OUTPATIENT
Start: 2024-10-08

## 2024-10-08 NOTE — TELEPHONE ENCOUNTER
PCP: Abigail Olson, APRN - NP    Last appt: 6/3/2024   No future appointments.    Requested Prescriptions     Pending Prescriptions Disp Refills    hydroCHLOROthiazide 12.5 MG tablet [Pharmacy Med Name: HYDROCHLOROTHIAZIDE 12.5 MG TB] 90 tablet 0     Sig: TAKE 1 TABLET BY MOUTH EVERY DAY         Prior labs and Blood pressures:  BP Readings from Last 3 Encounters:   06/03/24 (!) 164/84   05/16/24 (!) 177/78   11/30/23 138/70     Lab Results   Component Value Date/Time     06/03/2024 11:41 AM    K 5.0 06/03/2024 11:41 AM     06/03/2024 11:41 AM    CO2 29 06/03/2024 11:41 AM    BUN 16 06/03/2024 11:41 AM    GFRAA >60 08/03/2022 09:46 AM     Lab Results   Component Value Date/Time    [id number] 6.7 06/03/2024 11:14 AM     Lab Results   Component Value Date/Time    CHOL 143 06/03/2024 11:41 AM    HDL 81 06/03/2024 11:41 AM    LDL 50 06/03/2024 11:41 AM    LDL 59.2 02/17/2023 08:59 AM    VLDL 12 06/03/2024 11:41 AM     No results found for: \"VITD3\"    Lab Results   Component Value Date/Time    TSH 1.46 02/17/2023 08:59 AM

## 2024-10-09 DIAGNOSIS — E11.9 TYPE 2 DIABETES MELLITUS WITHOUT COMPLICATION, WITHOUT LONG-TERM CURRENT USE OF INSULIN (HCC): ICD-10-CM

## 2024-10-09 DIAGNOSIS — I10 PRIMARY HYPERTENSION: ICD-10-CM

## 2024-10-10 RX ORDER — LOSARTAN POTASSIUM 100 MG/1
100 TABLET ORAL DAILY
Qty: 30 TABLET | Refills: 0 | Status: SHIPPED | OUTPATIENT
Start: 2024-10-10

## 2024-11-01 DIAGNOSIS — I10 PRIMARY HYPERTENSION: ICD-10-CM

## 2024-11-01 DIAGNOSIS — E11.9 TYPE 2 DIABETES MELLITUS WITHOUT COMPLICATION, WITHOUT LONG-TERM CURRENT USE OF INSULIN (HCC): ICD-10-CM

## 2024-11-01 RX ORDER — LOSARTAN POTASSIUM 100 MG/1
100 TABLET ORAL DAILY
Qty: 90 TABLET | Refills: 0 | Status: SHIPPED | OUTPATIENT
Start: 2024-11-01

## 2024-11-01 NOTE — TELEPHONE ENCOUNTER
PCP: Abigail Olson, APRN - NP    Last Appt:6/3/2024    No future appointments.    Requested Prescriptions     Pending Prescriptions Disp Refills    losartan (COZAAR) 100 MG tablet [Pharmacy Med Name: LOSARTAN POTASSIUM 100 MG TAB] 90 tablet 1     Sig: TAKE 1 TABLET BY MOUTH EVERY DAY       Prior labs and Blood pressures:  BP Readings from Last 3 Encounters:   06/03/24 (!) 164/84   05/16/24 (!) 177/78   11/30/23 138/70     Lab Results   Component Value Date/Time     06/03/2024 11:41 AM    K 5.0 06/03/2024 11:41 AM     06/03/2024 11:41 AM    CO2 29 06/03/2024 11:41 AM    BUN 16 06/03/2024 11:41 AM    GFRAA >60 08/03/2022 09:46 AM     Lab Results   Component Value Date/Time    [id number] 6.7 06/03/2024 11:14 AM     Lab Results   Component Value Date/Time    CHOL 143 06/03/2024 11:41 AM    HDL 81 06/03/2024 11:41 AM    LDL 50 06/03/2024 11:41 AM    LDL 59.2 02/17/2023 08:59 AM    VLDL 12 06/03/2024 11:41 AM     No results found for: \"VITD3\"    Lab Results   Component Value Date/Time    TSH 1.46 02/17/2023 08:59 AM

## 2024-11-05 DIAGNOSIS — I10 PRIMARY HYPERTENSION: ICD-10-CM

## 2024-11-05 RX ORDER — HYDROCHLOROTHIAZIDE 12.5 MG/1
12.5 TABLET ORAL DAILY
Qty: 30 TABLET | Refills: 0 | Status: SHIPPED | OUTPATIENT
Start: 2024-11-05

## 2024-11-10 DIAGNOSIS — E78.49 OTHER HYPERLIPIDEMIA: ICD-10-CM

## 2024-11-10 DIAGNOSIS — E11.9 TYPE 2 DIABETES MELLITUS WITHOUT COMPLICATION, WITHOUT LONG-TERM CURRENT USE OF INSULIN (HCC): ICD-10-CM

## 2024-11-12 RX ORDER — ROSUVASTATIN CALCIUM 5 MG/1
5 TABLET, COATED ORAL NIGHTLY
Qty: 90 TABLET | Refills: 0 | Status: SHIPPED | OUTPATIENT
Start: 2024-11-12

## 2024-11-12 NOTE — TELEPHONE ENCOUNTER
PCP: Abigail Olson APRN - NP    Last appt: 6/3/2024   Future Appointments   Date Time Provider Department Center   12/19/2024  8:45 AM Abigail Olson APRN - NP [facility]       Requested Prescriptions     Pending Prescriptions Disp Refills    rosuvastatin (CRESTOR) 5 MG tablet [Pharmacy Med Name: ROSUVASTATIN CALCIUM 5 MG TAB] 90 tablet 0     Sig: TAKE 1 TABLET BY MOUTH EVERY DAY AT NIGHT         Prior labs and Blood pressures:  BP Readings from Last 3 Encounters:   06/03/24 (!) 164/84   05/16/24 (!) 177/78   11/30/23 138/70     Lab Results   Component Value Date/Time     06/03/2024 11:41 AM    K 5.0 06/03/2024 11:41 AM     06/03/2024 11:41 AM    CO2 29 06/03/2024 11:41 AM    BUN 16 06/03/2024 11:41 AM    GFRAA >60 08/03/2022 09:46 AM     Lab Results   Component Value Date/Time    [id number] 6.7 06/03/2024 11:14 AM     Lab Results   Component Value Date/Time    CHOL 143 06/03/2024 11:41 AM    HDL 81 06/03/2024 11:41 AM    LDL 50 06/03/2024 11:41 AM    LDL 59.2 02/17/2023 08:59 AM    VLDL 12 06/03/2024 11:41 AM     No results found for: \"VITD3\"    Lab Results   Component Value Date/Time    TSH 1.46 02/17/2023 08:59 AM

## 2024-12-02 DIAGNOSIS — I10 PRIMARY HYPERTENSION: ICD-10-CM

## 2024-12-02 RX ORDER — HYDROCHLOROTHIAZIDE 12.5 MG/1
12.5 TABLET ORAL DAILY
Qty: 30 TABLET | Refills: 0 | Status: SHIPPED | OUTPATIENT
Start: 2024-12-02

## 2024-12-02 NOTE — TELEPHONE ENCOUNTER
PCP: Abigail Olson APRN - NP    Last appt: 6/3/2024     Future Appointments   Date Time Provider Department Center   12/19/2024  8:45 AM [facility]       Requested Prescriptions     Pending Prescriptions Disp Refills    hydroCHLOROthiazide 12.5 MG tablet [Pharmacy Med Name: HYDROCHLOROTHIAZIDE 12.5 MG TB] 30 tablet 0     Sig: TAKE 1 TABLET BY MOUTH EVERY DAY         Prior labs and Blood pressures:  BP Readings from Last 3 Encounters:   06/03/24 (!) 164/84   05/16/24 (!) 177/78   11/30/23 138/70     Lab Results   Component Value Date/Time     06/03/2024 11:41 AM    K 5.0 06/03/2024 11:41 AM     06/03/2024 11:41 AM    CO2 29 06/03/2024 11:41 AM    BUN 16 06/03/2024 11:41 AM    GFRAA >60 08/03/2022 09:46 AM     Lab Results   Component Value Date/Time    [id number] 6.7 06/03/2024 11:14 AM     Lab Results   Component Value Date/Time    CHOL 143 06/03/2024 11:41 AM    HDL 81 06/03/2024 11:41 AM    LDL 50 06/03/2024 11:41 AM    LDL 59.2 02/17/2023 08:59 AM    VLDL 12 06/03/2024 11:41 AM     Lab Results   Component Value Date/Time    TSH 1.46 02/17/2023 08:59 AM

## 2024-12-18 SDOH — ECONOMIC STABILITY: TRANSPORTATION INSECURITY
IN THE PAST 12 MONTHS, HAS LACK OF TRANSPORTATION KEPT YOU FROM MEETINGS, WORK, OR FROM GETTING THINGS NEEDED FOR DAILY LIVING?: PATIENT DECLINED

## 2024-12-18 SDOH — ECONOMIC STABILITY: INCOME INSECURITY: HOW HARD IS IT FOR YOU TO PAY FOR THE VERY BASICS LIKE FOOD, HOUSING, MEDICAL CARE, AND HEATING?: PATIENT DECLINED

## 2024-12-18 SDOH — ECONOMIC STABILITY: FOOD INSECURITY: WITHIN THE PAST 12 MONTHS, YOU WORRIED THAT YOUR FOOD WOULD RUN OUT BEFORE YOU GOT MONEY TO BUY MORE.: PATIENT DECLINED

## 2024-12-18 SDOH — ECONOMIC STABILITY: FOOD INSECURITY: WITHIN THE PAST 12 MONTHS, THE FOOD YOU BOUGHT JUST DIDN'T LAST AND YOU DIDN'T HAVE MONEY TO GET MORE.: PATIENT DECLINED

## 2024-12-19 ENCOUNTER — OFFICE VISIT (OUTPATIENT)
Age: 69
End: 2024-12-19
Payer: MEDICARE

## 2024-12-19 VITALS
WEIGHT: 178 LBS | TEMPERATURE: 97.8 F | BODY MASS INDEX: 32.76 KG/M2 | SYSTOLIC BLOOD PRESSURE: 129 MMHG | HEART RATE: 80 BPM | HEIGHT: 62 IN | OXYGEN SATURATION: 98 % | RESPIRATION RATE: 12 BRPM | DIASTOLIC BLOOD PRESSURE: 73 MMHG

## 2024-12-19 DIAGNOSIS — I10 PRIMARY HYPERTENSION: ICD-10-CM

## 2024-12-19 DIAGNOSIS — E78.49 OTHER HYPERLIPIDEMIA: ICD-10-CM

## 2024-12-19 DIAGNOSIS — E11.65 TYPE 2 DIABETES MELLITUS WITH HYPERGLYCEMIA, WITHOUT LONG-TERM CURRENT USE OF INSULIN (HCC): ICD-10-CM

## 2024-12-19 DIAGNOSIS — Z00.00 MEDICARE ANNUAL WELLNESS VISIT, SUBSEQUENT: Primary | ICD-10-CM

## 2024-12-19 DIAGNOSIS — Z00.00 HEALTHCARE MAINTENANCE: ICD-10-CM

## 2024-12-19 DIAGNOSIS — E55.9 HYPOVITAMINOSIS D: ICD-10-CM

## 2024-12-19 DIAGNOSIS — E11.3313 MODERATE NONPROLIFERATIVE DIABETIC RETINOPATHY OF BOTH EYES WITH MACULAR EDEMA ASSOCIATED WITH TYPE 2 DIABETES MELLITUS (HCC): ICD-10-CM

## 2024-12-19 LAB
25(OH)D3 SERPL-MCNC: 42.8 NG/ML (ref 30–100)
ALBUMIN SERPL-MCNC: 4 G/DL (ref 3.5–5)
ALBUMIN/GLOB SERPL: 1.2 (ref 1.1–2.2)
ALP SERPL-CCNC: 96 U/L (ref 45–117)
ALT SERPL-CCNC: 11 U/L (ref 12–78)
ANION GAP SERPL CALC-SCNC: 4 MMOL/L (ref 2–12)
AST SERPL-CCNC: 12 U/L (ref 15–37)
BILIRUB SERPL-MCNC: 0.4 MG/DL (ref 0.2–1)
BUN SERPL-MCNC: 19 MG/DL (ref 6–20)
BUN/CREAT SERPL: 19 (ref 12–20)
CALCIUM SERPL-MCNC: 9.9 MG/DL (ref 8.5–10.1)
CHLORIDE SERPL-SCNC: 107 MMOL/L (ref 97–108)
CO2 SERPL-SCNC: 28 MMOL/L (ref 21–32)
CREAT SERPL-MCNC: 1.02 MG/DL (ref 0.55–1.02)
GLOBULIN SER CALC-MCNC: 3.3 G/DL (ref 2–4)
GLUCOSE SERPL-MCNC: 124 MG/DL (ref 65–100)
HBA1C MFR BLD: 6.5 %
POTASSIUM SERPL-SCNC: 4.2 MMOL/L (ref 3.5–5.1)
PROT SERPL-MCNC: 7.3 G/DL (ref 6.4–8.2)
SODIUM SERPL-SCNC: 139 MMOL/L (ref 136–145)

## 2024-12-19 PROCEDURE — 83036 HEMOGLOBIN GLYCOSYLATED A1C: CPT | Performed by: NURSE PRACTITIONER

## 2024-12-19 PROCEDURE — 99214 OFFICE O/P EST MOD 30 MIN: CPT | Performed by: NURSE PRACTITIONER

## 2024-12-19 RX ORDER — BLOOD SUGAR DIAGNOSTIC
STRIP MISCELLANEOUS
Qty: 100 STRIP | Refills: 3 | Status: SHIPPED | OUTPATIENT
Start: 2024-12-19

## 2024-12-19 RX ORDER — ROSUVASTATIN CALCIUM 5 MG/1
5 TABLET, COATED ORAL NIGHTLY
Qty: 90 TABLET | Refills: 1 | Status: SHIPPED | OUTPATIENT
Start: 2024-12-19

## 2024-12-19 RX ORDER — METFORMIN HYDROCHLORIDE 500 MG/1
1500 TABLET, EXTENDED RELEASE ORAL
Qty: 270 TABLET | Refills: 1 | Status: SHIPPED | OUTPATIENT
Start: 2024-12-19

## 2024-12-19 RX ORDER — LANCETS 33 GAUGE
EACH MISCELLANEOUS
Qty: 100 EACH | Refills: 1 | Status: SHIPPED | OUTPATIENT
Start: 2024-12-19

## 2024-12-19 RX ORDER — LOSARTAN POTASSIUM 100 MG/1
100 TABLET ORAL DAILY
Qty: 90 TABLET | Refills: 1 | Status: SHIPPED | OUTPATIENT
Start: 2024-12-19

## 2024-12-19 RX ORDER — HYDROCHLOROTHIAZIDE 12.5 MG/1
12.5 TABLET ORAL DAILY
Qty: 90 TABLET | Refills: 1 | Status: SHIPPED | OUTPATIENT
Start: 2024-12-19

## 2024-12-19 ASSESSMENT — PATIENT HEALTH QUESTIONNAIRE - PHQ9
1. LITTLE INTEREST OR PLEASURE IN DOING THINGS: NOT AT ALL
SUM OF ALL RESPONSES TO PHQ QUESTIONS 1-9: 0
2. FEELING DOWN, DEPRESSED OR HOPELESS: NOT AT ALL
SUM OF ALL RESPONSES TO PHQ9 QUESTIONS 1 & 2: 0
SUM OF ALL RESPONSES TO PHQ QUESTIONS 1-9: 0

## 2024-12-19 ASSESSMENT — LIFESTYLE VARIABLES
HOW OFTEN DO YOU HAVE A DRINK CONTAINING ALCOHOL: NEVER
HOW MANY STANDARD DRINKS CONTAINING ALCOHOL DO YOU HAVE ON A TYPICAL DAY: PATIENT DOES NOT DRINK

## 2024-12-19 NOTE — ASSESSMENT & PLAN NOTE
Monitored by specialist- no acute findings meriting change in the plan. Managed by Phuc Sanchez. Receives vabysmo injections

## 2024-12-19 NOTE — ASSESSMENT & PLAN NOTE
Chronic, at goal (stable), continue current treatment plan and lifestyle modifications recommended. Improving.   12/19/24 A1c= 6.5  6/30/2024 A1c = 6.7  9/20/2023 A1c = 6.3  5/18/2023 A1c = 6.6

## 2024-12-19 NOTE — PROGRESS NOTES
Chief Complaint   Patient presents with    Medicare AWV     fasting    Follow-up         \"Have you been to the ER, urgent care clinic since your last visit?  Hospitalized since your last visit?\"    No    “Have you seen or consulted any other health care providers outside of [facility] since your last visit?”    No    Have you had a mammogram?”   NO    Date of last Mammogram: 11/28/2022         “Have you had a colorectal cancer screening such as a colonoscopy/FIT/Cologuard?    NO getting one December 27    Date of last Colonoscopy: 11/25/2019  No cologuard on file  Date of last FIT: 4/29/2021   No flexible sigmoidoscopy on file         Click Here for Release of Records Request           12/19/2024     8:26 AM   PHQ-9    Little interest or pleasure in doing things 0   Feeling down, depressed, or hopeless 0   PHQ-2 Score 0   PHQ-9 Total Score 0           Financial Resource Strain: Patient Declined (12/18/2024)    Overall Financial Resource Strain (CARDIA)     Difficulty of Paying Living Expenses: Patient declined      Food Insecurity: Patient Declined (12/18/2024)    Hunger Vital Sign     Worried About Running Out of Food in the Last Year: Patient declined     Ran Out of Food in the Last Year: Patient declined          Health Maintenance Due   Topic Date Due    Respiratory Syncytial Virus (RSV) Pregnant or age 60 yrs+ (1 - Risk 60-74 years 1-dose series) Never done    DTaP/Tdap/Td vaccine (1 - Tdap) 08/24/2023    Flu vaccine (1) 08/01/2024    COVID-19 Vaccine (5 - 2023-24 season) 09/01/2024    Colorectal Cancer Screen  11/25/2024    Breast cancer screen  11/28/2024

## 2024-12-19 NOTE — PROGRESS NOTES
[facility]  Clinic Note    Medicare Annual Wellness Visit    Tonya Barrios is here for Medicare AWV (fasting) and Follow-up    Assessment & Plan   Medicare annual wellness visit, subsequent  Moderate nonproliferative diabetic retinopathy of both eyes with macular edema associated with type 2 diabetes mellitus (HCC)  Assessment & Plan:    Monitored by specialist- no acute findings meriting change in the plan. Managed by Phuc Sanchez. Receives vabysmo injections  Orders:  -     AMB POC HEMOGLOBIN A1C  -     Comprehensive Metabolic Panel; Future  -     metFORMIN (GLUCOPHAGE-XR) 500 MG extended release tablet; Take 3 tablets by mouth Daily with supper, Disp-270 tablet, R-1Normal    Type 2 diabetes mellitus with hyperglycemia, without long-term current use of insulin (HCC)  Assessment & Plan:   Chronic, at goal (stable), continue current treatment plan and lifestyle modifications recommended. Improving.   12/19/24 A1c= 6.5  6/30/2024 A1c = 6.7  9/20/2023 A1c = 6.3  5/18/2023 A1c = 6.6  Orders:  -     AMB POC HEMOGLOBIN A1C  -     Comprehensive Metabolic Panel; Future  -     blood glucose test strips (ONETOUCH ULTRA) strip; PER INSURANCE PREFERENCE, TEST 1 TIME /DAY FOR DIABETES, Disp-100 strip, R-3Normal  -     losartan (COZAAR) 100 MG tablet; Take 1 tablet by mouth daily, Disp-90 tablet, R-1Normal  -     metFORMIN (GLUCOPHAGE-XR) 500 MG extended release tablet; Take 3 tablets by mouth Daily with supper, Disp-270 tablet, R-1Normal  -     OneTouch Delica Lancets 33G MISC; Use to check blood glucose once daily.  Dx: E11.9.  Use brand covered per insurance., Disp-100 each, R-1Normal  -     rosuvastatin (CRESTOR) 5 MG tablet; Take 1 tablet by mouth nightly, Disp-90 tablet, R-1Normal    Hypovitaminosis D  -     Vitamin D 25 Hydroxy; Future    Primary hypertension  - Controlled. No dose adjustments at this time.  -     hydroCHLOROthiazide 12.5 MG tablet; Take 1 tablet by mouth daily, Disp-90

## 2025-06-17 SDOH — ECONOMIC STABILITY: TRANSPORTATION INSECURITY
IN THE PAST 12 MONTHS, HAS THE LACK OF TRANSPORTATION KEPT YOU FROM MEDICAL APPOINTMENTS OR FROM GETTING MEDICATIONS?: NO

## 2025-06-17 SDOH — ECONOMIC STABILITY: FOOD INSECURITY: WITHIN THE PAST 12 MONTHS, YOU WORRIED THAT YOUR FOOD WOULD RUN OUT BEFORE YOU GOT MONEY TO BUY MORE.: NEVER TRUE

## 2025-06-17 SDOH — ECONOMIC STABILITY: INCOME INSECURITY: IN THE LAST 12 MONTHS, WAS THERE A TIME WHEN YOU WERE NOT ABLE TO PAY THE MORTGAGE OR RENT ON TIME?: NO

## 2025-06-17 SDOH — ECONOMIC STABILITY: FOOD INSECURITY: WITHIN THE PAST 12 MONTHS, THE FOOD YOU BOUGHT JUST DIDN'T LAST AND YOU DIDN'T HAVE MONEY TO GET MORE.: NEVER TRUE

## 2025-06-17 SDOH — ECONOMIC STABILITY: TRANSPORTATION INSECURITY
IN THE PAST 12 MONTHS, HAS LACK OF TRANSPORTATION KEPT YOU FROM MEETINGS, WORK, OR FROM GETTING THINGS NEEDED FOR DAILY LIVING?: NO

## 2025-06-19 ENCOUNTER — RESULTS FOLLOW-UP (OUTPATIENT)
Age: 70
End: 2025-06-19

## 2025-06-19 ENCOUNTER — OFFICE VISIT (OUTPATIENT)
Age: 70
End: 2025-06-19
Payer: MEDICARE

## 2025-06-19 VITALS
BODY MASS INDEX: 34.23 KG/M2 | WEIGHT: 186 LBS | RESPIRATION RATE: 15 BRPM | HEART RATE: 82 BPM | TEMPERATURE: 97.7 F | DIASTOLIC BLOOD PRESSURE: 74 MMHG | OXYGEN SATURATION: 97 % | HEIGHT: 62 IN | SYSTOLIC BLOOD PRESSURE: 136 MMHG

## 2025-06-19 DIAGNOSIS — E11.65 TYPE 2 DIABETES MELLITUS WITH HYPERGLYCEMIA, WITHOUT LONG-TERM CURRENT USE OF INSULIN (HCC): Primary | ICD-10-CM

## 2025-06-19 DIAGNOSIS — I10 PRIMARY HYPERTENSION: ICD-10-CM

## 2025-06-19 DIAGNOSIS — E11.3313 MODERATE NONPROLIFERATIVE DIABETIC RETINOPATHY OF BOTH EYES WITH MACULAR EDEMA ASSOCIATED WITH TYPE 2 DIABETES MELLITUS (HCC): ICD-10-CM

## 2025-06-19 DIAGNOSIS — M81.0 AGE-RELATED OSTEOPOROSIS WITHOUT CURRENT PATHOLOGICAL FRACTURE: ICD-10-CM

## 2025-06-19 DIAGNOSIS — E55.9 HYPOVITAMINOSIS D: ICD-10-CM

## 2025-06-19 DIAGNOSIS — E78.2 MIXED HYPERLIPIDEMIA: ICD-10-CM

## 2025-06-19 DIAGNOSIS — Z53.20 MAMMOGRAM DECLINED: ICD-10-CM

## 2025-06-19 LAB — HBA1C MFR BLD: 6.4 %

## 2025-06-19 PROCEDURE — PBSHW AMB POC HEMOGLOBIN A1C: Performed by: NURSE PRACTITIONER

## 2025-06-19 PROCEDURE — 1036F TOBACCO NON-USER: CPT | Performed by: NURSE PRACTITIONER

## 2025-06-19 PROCEDURE — 3017F COLORECTAL CA SCREEN DOC REV: CPT | Performed by: NURSE PRACTITIONER

## 2025-06-19 PROCEDURE — 1159F MED LIST DOCD IN RCRD: CPT | Performed by: NURSE PRACTITIONER

## 2025-06-19 PROCEDURE — 1123F ACP DISCUSS/DSCN MKR DOCD: CPT | Performed by: NURSE PRACTITIONER

## 2025-06-19 PROCEDURE — G2211 COMPLEX E/M VISIT ADD ON: HCPCS | Performed by: NURSE PRACTITIONER

## 2025-06-19 PROCEDURE — 3044F HG A1C LEVEL LT 7.0%: CPT | Performed by: NURSE PRACTITIONER

## 2025-06-19 PROCEDURE — 1090F PRES/ABSN URINE INCON ASSESS: CPT | Performed by: NURSE PRACTITIONER

## 2025-06-19 PROCEDURE — 3046F HEMOGLOBIN A1C LEVEL >9.0%: CPT | Performed by: NURSE PRACTITIONER

## 2025-06-19 PROCEDURE — 3075F SYST BP GE 130 - 139MM HG: CPT | Performed by: NURSE PRACTITIONER

## 2025-06-19 PROCEDURE — G8399 PT W/DXA RESULTS DOCUMENT: HCPCS | Performed by: NURSE PRACTITIONER

## 2025-06-19 PROCEDURE — 3078F DIAST BP <80 MM HG: CPT | Performed by: NURSE PRACTITIONER

## 2025-06-19 PROCEDURE — 1126F AMNT PAIN NOTED NONE PRSNT: CPT | Performed by: NURSE PRACTITIONER

## 2025-06-19 PROCEDURE — 1160F RVW MEDS BY RX/DR IN RCRD: CPT | Performed by: NURSE PRACTITIONER

## 2025-06-19 PROCEDURE — 83036 HEMOGLOBIN GLYCOSYLATED A1C: CPT | Performed by: NURSE PRACTITIONER

## 2025-06-19 PROCEDURE — 99214 OFFICE O/P EST MOD 30 MIN: CPT | Performed by: NURSE PRACTITIONER

## 2025-06-19 PROCEDURE — G8427 DOCREV CUR MEDS BY ELIG CLIN: HCPCS | Performed by: NURSE PRACTITIONER

## 2025-06-19 PROCEDURE — G8417 CALC BMI ABV UP PARAM F/U: HCPCS | Performed by: NURSE PRACTITIONER

## 2025-06-19 PROCEDURE — 2022F DILAT RTA XM EVC RTNOPTHY: CPT | Performed by: NURSE PRACTITIONER

## 2025-06-19 RX ORDER — METFORMIN HYDROCHLORIDE 500 MG/1
1500 TABLET, EXTENDED RELEASE ORAL
Qty: 270 TABLET | Refills: 1 | Status: SHIPPED | OUTPATIENT
Start: 2025-06-19

## 2025-06-19 RX ORDER — BLOOD SUGAR DIAGNOSTIC
STRIP MISCELLANEOUS
Qty: 100 STRIP | Refills: 3 | Status: SHIPPED | OUTPATIENT
Start: 2025-06-19

## 2025-06-19 RX ORDER — LOSARTAN POTASSIUM 100 MG/1
100 TABLET ORAL DAILY
Qty: 90 TABLET | Refills: 1 | Status: SHIPPED | OUTPATIENT
Start: 2025-06-19

## 2025-06-19 RX ORDER — HYDROCHLOROTHIAZIDE 12.5 MG/1
12.5 TABLET ORAL DAILY
Qty: 90 TABLET | Refills: 1 | Status: SHIPPED | OUTPATIENT
Start: 2025-06-19

## 2025-06-19 RX ORDER — ROSUVASTATIN CALCIUM 5 MG/1
5 TABLET, COATED ORAL NIGHTLY
Qty: 90 TABLET | Refills: 1 | Status: SHIPPED | OUTPATIENT
Start: 2025-06-19

## 2025-06-19 ASSESSMENT — PATIENT HEALTH QUESTIONNAIRE - PHQ9
2. FEELING DOWN, DEPRESSED OR HOPELESS: NOT AT ALL
SUM OF ALL RESPONSES TO PHQ QUESTIONS 1-9: 0
SUM OF ALL RESPONSES TO PHQ QUESTIONS 1-9: 0
1. LITTLE INTEREST OR PLEASURE IN DOING THINGS: NOT AT ALL
SUM OF ALL RESPONSES TO PHQ QUESTIONS 1-9: 0
SUM OF ALL RESPONSES TO PHQ QUESTIONS 1-9: 0

## 2025-06-19 NOTE — PROGRESS NOTES
[facility]  Clinic Note     Tonya Barrios (: 1955) is a 70 y.o. female, established patient, here for evaluation of the following chief complaint(s):  Diabetes and Hypertension       ASSESSMENT/PLAN:    Assessment & Plan  Type 2 diabetes mellitus with hyperglycemia, without long-term current use of insulin (HCC)   Chronic. Controlled and at goal of <7.0  2025 A1c= 6.4  2024 A1c= 6.5  2024 A1c = 6.7  2023 A1c = 6.3  2023 A1c = 6.6  Orders:    AMB POC HEMOGLOBIN A1C    Comprehensive Metabolic Panel; Future    Lipid Panel; Future    Albumin/Creatinine Ratio, Urine; Future     DIABETES FOOT EXAM    blood glucose test strips (ONETOUCH ULTRA) strip; PER INSURANCE PREFERENCE, TEST 1 TIME /DAY FOR DIABETES    losartan (COZAAR) 100 MG tablet; Take 1 tablet by mouth daily    metFORMIN (GLUCOPHAGE-XR) 500 MG extended release tablet; Take 3 tablets by mouth Daily with supper    rosuvastatin (CRESTOR) 5 MG tablet; Take 1 tablet by mouth nightly    Moderate nonproliferative diabetic retinopathy of both eyes with macular edema associated with type 2 diabetes mellitus (HCC)  Monitored by specialist- no acute findings meriting change in the plan. Managed by Phuc Sanchez. Receives vabysmo injections   Orders:    metFORMIN (GLUCOPHAGE-XR) 500 MG extended release tablet; Take 3 tablets by mouth Daily with supper    Primary hypertension  - Borderline reading today. No adjustments at this time.  Orders:    Comprehensive Metabolic Panel; Future    hydroCHLOROthiazide 12.5 MG tablet; Take 1 tablet by mouth daily    losartan (COZAAR) 100 MG tablet; Take 1 tablet by mouth daily    Age-related osteoporosis without current pathological fracture   - Due for recheck  - Reviewed last DEXA  scan results: Left femoral neck T-score -2.0, right femoral neck T-score -2.0, left total hip T-score -0.1, right total hip right T-score -0.1, lumbar spine L1-L4 T-score -0.1, 33% left radius

## 2025-06-19 NOTE — ASSESSMENT & PLAN NOTE
Chronic. Controlled and at goal of <7.0  6/19/2025 A1c= 6.4  12/19/2024 A1c= 6.5  6/30/2024 A1c = 6.7  9/20/2023 A1c = 6.3  5/18/2023 A1c = 6.6  Orders:    AMB POC HEMOGLOBIN A1C    Comprehensive Metabolic Panel; Future    Lipid Panel; Future    Albumin/Creatinine Ratio, Urine; Future     DIABETES FOOT EXAM    blood glucose test strips (ONETOUCH ULTRA) strip; PER INSURANCE PREFERENCE, TEST 1 TIME /DAY FOR DIABETES    losartan (COZAAR) 100 MG tablet; Take 1 tablet by mouth daily    metFORMIN (GLUCOPHAGE-XR) 500 MG extended release tablet; Take 3 tablets by mouth Daily with supper    rosuvastatin (CRESTOR) 5 MG tablet; Take 1 tablet by mouth nightly

## 2025-06-19 NOTE — ASSESSMENT & PLAN NOTE
Monitored by specialist- no acute findings meriting change in the plan. Managed by Phuc Sanchez. Receives vabysmo injections   Orders:    metFORMIN (GLUCOPHAGE-XR) 500 MG extended release tablet; Take 3 tablets by mouth Daily with supper

## 2025-06-19 NOTE — ASSESSMENT & PLAN NOTE
- Borderline reading today. No adjustments at this time.  Orders:    Comprehensive Metabolic Panel; Future    hydroCHLOROthiazide 12.5 MG tablet; Take 1 tablet by mouth daily    losartan (COZAAR) 100 MG tablet; Take 1 tablet by mouth daily

## 2025-06-19 NOTE — PROGRESS NOTES
Chief Complaint   Patient presents with    Diabetes    Hypertension         \"Have you been to the ER, urgent care clinic since your last visit?  Hospitalized since your last visit?\"    NO    “Have you seen or consulted any other health care providers outside of [facility] since your last visit?”    NO    Have you had a mammogram?”   YES - Where: [facility]  Nurse/CMA to request most recent records if not in the chart    Date of last Mammogram: 11/28/2022             Click Here for Release of Records Request           6/19/2025     8:12 AM   PHQ-9    Little interest or pleasure in doing things 0   Feeling down, depressed, or hopeless 0   PHQ-2 Score 0   PHQ-9 Total Score 0           Financial Resource Strain: Patient Declined (12/18/2024)    Overall Financial Resource Strain (CARDIA)     Difficulty of Paying Living Expenses: Patient declined      Food Insecurity: No Food Insecurity (6/17/2025)    Hunger Vital Sign     Worried About Running Out of Food in the Last Year: Never true     Ran Out of Food in the Last Year: Never true          Health Maintenance Due   Topic Date Due    Respiratory Syncytial Virus (RSV) Pregnant or age 60 yrs+ (1 - Risk 60-74 years 1-dose series) Never done    DTaP/Tdap/Td vaccine (1 - Tdap) 08/24/2023    COVID-19 Vaccine (5 - 2024-25 season) 09/01/2024    Breast cancer screen  11/28/2024    Diabetic foot exam  06/03/2025    Diabetic Alb to Cr ratio (uACR) test  06/03/2025    Lipids  06/03/2025

## 2025-06-23 ENCOUNTER — LAB (OUTPATIENT)
Age: 70
End: 2025-06-23

## 2025-06-23 DIAGNOSIS — E78.2 MIXED HYPERLIPIDEMIA: ICD-10-CM

## 2025-06-23 DIAGNOSIS — I10 PRIMARY HYPERTENSION: ICD-10-CM

## 2025-06-23 DIAGNOSIS — E11.65 TYPE 2 DIABETES MELLITUS WITH HYPERGLYCEMIA, WITHOUT LONG-TERM CURRENT USE OF INSULIN (HCC): ICD-10-CM

## 2025-06-23 DIAGNOSIS — E55.9 HYPOVITAMINOSIS D: ICD-10-CM

## 2025-06-23 DIAGNOSIS — M81.0 AGE-RELATED OSTEOPOROSIS WITHOUT CURRENT PATHOLOGICAL FRACTURE: ICD-10-CM

## 2025-06-24 LAB
25(OH)D3 SERPL-MCNC: 52 NG/ML (ref 30–100)
ALBUMIN SERPL-MCNC: 3.7 G/DL (ref 3.5–5)
ALBUMIN/GLOB SERPL: 1.2 (ref 1.1–2.2)
ALP SERPL-CCNC: 91 U/L (ref 45–117)
ALT SERPL-CCNC: 14 U/L (ref 12–78)
ANION GAP SERPL CALC-SCNC: 6 MMOL/L (ref 2–12)
AST SERPL-CCNC: 9 U/L (ref 15–37)
BILIRUB SERPL-MCNC: 0.4 MG/DL (ref 0.2–1)
BUN SERPL-MCNC: 23 MG/DL (ref 6–20)
BUN/CREAT SERPL: 22 (ref 12–20)
CALCIUM SERPL-MCNC: 9.5 MG/DL (ref 8.5–10.1)
CHLORIDE SERPL-SCNC: 108 MMOL/L (ref 97–108)
CHOLEST SERPL-MCNC: 140 MG/DL
CO2 SERPL-SCNC: 26 MMOL/L (ref 21–32)
CREAT SERPL-MCNC: 1.03 MG/DL (ref 0.55–1.02)
CREAT UR-MCNC: 125 MG/DL
GLOBULIN SER CALC-MCNC: 3.1 G/DL (ref 2–4)
GLUCOSE SERPL-MCNC: 148 MG/DL (ref 65–100)
HDLC SERPL-MCNC: 79 MG/DL
HDLC SERPL: 1.8 (ref 0–5)
LDLC SERPL CALC-MCNC: 46.2 MG/DL (ref 0–100)
MICROALBUMIN UR-MCNC: 1.49 MG/DL
MICROALBUMIN/CREAT UR-RTO: 12 MG/G (ref 0–30)
POTASSIUM SERPL-SCNC: 4.2 MMOL/L (ref 3.5–5.1)
PROT SERPL-MCNC: 6.8 G/DL (ref 6.4–8.2)
SODIUM SERPL-SCNC: 140 MMOL/L (ref 136–145)
TRIGL SERPL-MCNC: 74 MG/DL
VLDLC SERPL CALC-MCNC: 14.8 MG/DL

## 2025-06-26 ENCOUNTER — HOSPITAL ENCOUNTER (OUTPATIENT)
Facility: HOSPITAL | Age: 70
Discharge: HOME | End: 2025-06-29
Payer: MEDICARE

## 2025-06-26 DIAGNOSIS — M81.0 AGE-RELATED OSTEOPOROSIS WITHOUT CURRENT PATHOLOGICAL FRACTURE: ICD-10-CM

## 2025-06-26 PROCEDURE — 77080 DXA BONE DENSITY AXIAL: CPT

## (undated) DEVICE — CLICKLINE SCISSORS INSERT: Brand: CLICKLINE

## (undated) DEVICE — DISPOSABLE TOURNIQUET CUFF SINGLE BLADDER, DUAL PORT AND QUICK CONNECT CONNECTOR: Brand: COLOR CUFF

## (undated) DEVICE — LAPAROSCOPIC TROCAR SLEEVE/SINGLE USE: Brand: KII® OPTICAL ACCESS SYSTEM

## (undated) DEVICE — BIT DRL 2.2MM DISP --

## (undated) DEVICE — NEEDLE HYPO 22GA L1.5IN BLK S STL HUB POLYPR SHLD REG BVL

## (undated) DEVICE — DRSG GZ OIL EMUL CURAD 3X3 --

## (undated) DEVICE — APPLICATOR SURG L38CM RIG ATOMIZING SGL USE XL-R FLEXITIP

## (undated) DEVICE — INTENDED FOR TISSUE SEPARATION, AND OTHER PROCEDURES THAT REQUIRE A SHARP SURGICAL BLADE TO PUNCTURE OR CUT.: Brand: BARD-PARKER ® CARBON RIB-BACK BLADES

## (undated) DEVICE — STERILE POLYISOPRENE POWDER-FREE SURGICAL GLOVES WITH EMOLLIENT COATING: Brand: PROTEXIS

## (undated) DEVICE — REM POLYHESIVE ADULT PATIENT RETURN ELECTRODE: Brand: VALLEYLAB

## (undated) DEVICE — COVER LT HNDL PLAS RIG 1 PER PK

## (undated) DEVICE — SUTURE VCRL SZ 3-0 L27IN ABSRB UD L26MM SH 1/2 CIR J416H

## (undated) DEVICE — SUTURE MCRYL SZ 4-0 L27IN ABSRB UD L19MM PS-2 1/2 CIR PRIM Y426H

## (undated) DEVICE — TROCAR: Brand: KII® OPTICAL ACCESS SYSTEM

## (undated) DEVICE — GARMENT,MEDLINE,DVT,INT,CALF,MED, GEN2: Brand: MEDLINE

## (undated) DEVICE — DERMABOND SKIN ADH 0.7ML -- DERMABOND ADVANCED 12/BX

## (undated) DEVICE — GUIDEWIRE ORTH DIA0.045IN S STL TRCR TIP LSR LN QUICKFIX

## (undated) DEVICE — GLOVE ORANGE PI 7 1/2   MSG9075

## (undated) DEVICE — TROCAR SITE CLOSURE DEVICE: Brand: ENDO CLOSE

## (undated) DEVICE — INFECTION CONTROL KIT SYS

## (undated) DEVICE — DRAPE,UTILTY,TAPE,15X26, 4EA/PK: Brand: MEDLINE

## (undated) DEVICE — SUT MCRYL 4-0 27IN PS2 UD --

## (undated) DEVICE — BB TAK

## (undated) DEVICE — FILTER SMK EVAC FLO CLR MEGADYNE

## (undated) DEVICE — Device

## (undated) DEVICE — 4-PORT MANIFOLD: Brand: NEPTUNE 2

## (undated) DEVICE — SURGICAL PROCEDURE KIT GEN LAPAROSCOPY LF

## (undated) DEVICE — BANDAGE,GAUZE,CONFORMING,3"X75",STRL,LF: Brand: MEDLINE

## (undated) DEVICE — AGENT HEMSTAT 3GM PURIFIED PLNT STARCH PWD ABSRB ARISTA AH

## (undated) DEVICE — BANDAGE,ELASTIC,ESMARK,STERILE,4"X9',LF: Brand: MEDLINE

## (undated) DEVICE — APPLIER CLP M/L SHFT DIA5MM 15 LIG LIGAMAX 5

## (undated) DEVICE — SUTURE SZ 0 27IN 5/8 CIR UR-6  TAPER PT VIOLET ABSRB VICRYL J603H

## (undated) DEVICE — SYR 10ML LUER LOK 1/5ML GRAD --

## (undated) DEVICE — SOLUTION IRRIG 1000ML 0.9% SOD CHL USP POUR PLAS BTL

## (undated) DEVICE — STRAP,POSITIONING,KNEE/BODY,FOAM,4X60": Brand: MEDLINE

## (undated) DEVICE — SUTURE VCRL SZ 2-0 L27IN ABSRB UD L26MM SH 1/2 CIR J417H

## (undated) DEVICE — PREP SKN CHLRAPRP APL 26ML STR --

## (undated) DEVICE — BAG SPEC REM 224ML W4XL6IN DIA10MM 1 HND GYN DISP ENDOPCH

## (undated) DEVICE — STRIP,CLOSURE,WOUND,MEDI-STRIP,1/2X4: Brand: MEDLINE

## (undated) DEVICE — BB-TAK MTP THREADED

## (undated) DEVICE — HYPODERMIC SAFETY NEEDLE: Brand: MONOJECT

## (undated) DEVICE — SYRINGE,EAR/ULCER, 2 OZ, STERILE: Brand: MEDLINE

## (undated) DEVICE — TROCAR: Brand: KII® SLEEVE

## (undated) DEVICE — ELECTRODE ES 36CM LAP FLAT L HK COAT DISP CLEANCOAT

## (undated) DEVICE — Device: Brand: JELCO

## (undated) DEVICE — EXTREMITY - SMH: Brand: MEDLINE INDUSTRIES, INC.